# Patient Record
Sex: FEMALE | Race: BLACK OR AFRICAN AMERICAN | NOT HISPANIC OR LATINO | Employment: OTHER | URBAN - METROPOLITAN AREA
[De-identification: names, ages, dates, MRNs, and addresses within clinical notes are randomized per-mention and may not be internally consistent; named-entity substitution may affect disease eponyms.]

---

## 2017-01-09 ENCOUNTER — OFFICE VISIT (OUTPATIENT)
Dept: INTERNAL MEDICINE | Facility: CLINIC | Age: 82
End: 2017-01-09
Payer: MEDICARE

## 2017-01-09 ENCOUNTER — LAB VISIT (OUTPATIENT)
Dept: LAB | Facility: HOSPITAL | Age: 82
End: 2017-01-09
Attending: INTERNAL MEDICINE
Payer: MEDICARE

## 2017-01-09 VITALS
SYSTOLIC BLOOD PRESSURE: 136 MMHG | DIASTOLIC BLOOD PRESSURE: 80 MMHG | BODY MASS INDEX: 26.76 KG/M2 | HEIGHT: 68 IN | HEART RATE: 66 BPM | WEIGHT: 176.56 LBS

## 2017-01-09 DIAGNOSIS — R22.0 FACIAL SWELLING: ICD-10-CM

## 2017-01-09 DIAGNOSIS — R22.0 FACIAL SWELLING: Primary | ICD-10-CM

## 2017-01-09 DIAGNOSIS — I10 ESSENTIAL HYPERTENSION: ICD-10-CM

## 2017-01-09 LAB
ANION GAP SERPL CALC-SCNC: 10 MMOL/L
BUN SERPL-MCNC: 5 MG/DL
CALCIUM SERPL-MCNC: 9.1 MG/DL
CHLORIDE SERPL-SCNC: 110 MMOL/L
CO2 SERPL-SCNC: 23 MMOL/L
CREAT SERPL-MCNC: 0.8 MG/DL
EST. GFR  (AFRICAN AMERICAN): >60 ML/MIN/1.73 M^2
EST. GFR  (NON AFRICAN AMERICAN): >60 ML/MIN/1.73 M^2
GLUCOSE SERPL-MCNC: 99 MG/DL
POTASSIUM SERPL-SCNC: 4.3 MMOL/L
SODIUM SERPL-SCNC: 143 MMOL/L

## 2017-01-09 PROCEDURE — 1160F RVW MEDS BY RX/DR IN RCRD: CPT | Mod: S$GLB,,, | Performed by: INTERNAL MEDICINE

## 2017-01-09 PROCEDURE — 1159F MED LIST DOCD IN RCRD: CPT | Mod: S$GLB,,, | Performed by: INTERNAL MEDICINE

## 2017-01-09 PROCEDURE — 80048 BASIC METABOLIC PNL TOTAL CA: CPT

## 2017-01-09 PROCEDURE — 1126F AMNT PAIN NOTED NONE PRSNT: CPT | Mod: S$GLB,,, | Performed by: INTERNAL MEDICINE

## 2017-01-09 PROCEDURE — 99214 OFFICE O/P EST MOD 30 MIN: CPT | Mod: S$GLB,,, | Performed by: INTERNAL MEDICINE

## 2017-01-09 PROCEDURE — 99499 UNLISTED E&M SERVICE: CPT | Mod: S$GLB,,, | Performed by: INTERNAL MEDICINE

## 2017-01-09 PROCEDURE — 36415 COLL VENOUS BLD VENIPUNCTURE: CPT

## 2017-01-09 PROCEDURE — 99999 PR PBB SHADOW E&M-EST. PATIENT-LVL III: CPT | Mod: PBBFAC,,, | Performed by: INTERNAL MEDICINE

## 2017-01-09 PROCEDURE — 1157F ADVNC CARE PLAN IN RCRD: CPT | Mod: S$GLB,,, | Performed by: INTERNAL MEDICINE

## 2017-01-09 RX ORDER — AMLODIPINE BESYLATE 5 MG/1
5 TABLET ORAL DAILY
Qty: 30 TABLET | Refills: 3 | Status: SHIPPED | OUTPATIENT
Start: 2017-01-09 | End: 2017-01-20 | Stop reason: SINTOL

## 2017-01-09 NOTE — MR AVS SNAPSHOT
[facility] - Internal Medicine  [address]  [facility] [address]  Phone: [phone]  Fax: [phone]                  Dalila Garcia   2017 9:30 AM   Office Visit    Description:  Female : 1928   Provider:  Vazquez Marsh MD   Department:  [facility] - Internal Medicine           Reason for Visit     Facial Swelling           Diagnoses this Visit        Comments    Facial swelling    -  Primary     Essential hypertension                To Do List           Future Appointments        Provider Department Dept Phone    2017 10:30 AM LAB, APPOINTMENT [facility] [phone]    2017 12:00 PM Vazquez Marsh MD [facility] Internal Medicine [phone]      Goals (5 Years of Data)     None      Follow-Up and Disposition     Return in about 2 weeks (around 2017).       These Medications        Disp Refills Start End    amlodipine (NORVASC) 5 MG tablet 30 tablet 3 2017     Take 1 tablet (5 mg total) by mouth once daily. - Oral    Pharmacy: [facility] [id number] [place] [address] [address] [facility] AT [facility] Ph #: [phone]         [facility]     [facility] Nurse Care [facility] -  Assistance  Registered nurses in the [facility] Center provide clinical advisement, health education, appointment booking, and other advisory services.  Call for this free service at [phone].             Medications           Message regarding Medications     Verify the changes and/or additions to your medication regime listed below are the same as discussed with your clinician today.  If any of these changes or additions are incorrect, please notify your healthcare provider.        CHANGE how you are taking these medications     Start Taking Instead of    amlodipine (NORVASC) 5 MG tablet amlodipine (NORVASC) 2.5 MG tablet    Dosage:  Take 1 tablet (5 mg total) by mouth once daily. Dosage:  Take 1 tablet  "(2.5 mg total) by mouth once daily.    Reason for Change:  Reorder       STOP taking these medications     permethrin (ELIMITE) 5 % cream Apply neck down at night x 1 application. Wash off in a.m. Repeat in 1 week    ketoconazole (NIZORAL) 2 % cream AAA bid to nose and cheeks prn flare red scaly rash    hydrOXYzine HCl (ATARAX) 10 MG Tab Take 1 tablet (10 mg total) by mouth every evening. Sig 1-2 pills po qhs. Avoid driving on this medication    ibuprofen (ADVIL,MOTRIN) 800 MG tablet     benazepril (LOTENSIN) 20 MG tablet TAKE 1 TABLET BY MOUTH TWICE DAILY           Verify that the below list of medications is an accurate representation of the medications you are currently taking.  If none reported, the list may be blank. If incorrect, please contact your healthcare provider. Carry this list with you in case of emergency.           Current Medications     amlodipine (NORVASC) 5 MG tablet Take 1 tablet (5 mg total) by mouth once daily.    cetirizine (ZYRTEC) 10 MG tablet TK 1 T PO  QD    meclizine (ANTIVERT) 12.5 mg tablet Take 1 tablet (12.5 mg total) by mouth 3 (three) times daily as needed for Dizziness.    walker Misc Rollator walker for ambulation for both household and community distances.    augmented betamethasone dipropionate (DIPROLENE-AF) 0.05 % cream Apply topically 2 (two) times daily.           Clinical Reference Information           Vital Signs - Last Recorded  Most recent update: 1/9/2017 10:03 AM by Danita Worthington MA    BP Pulse Ht Wt BMI    136/80 (BP Location: Right arm, Patient Position: Sitting, BP Method: Manual) 66 5' 8" (1.727 m) 80.1 kg (176 lb 9.4 oz) 26.85 kg/m2      Blood Pressure          Most Recent Value    BP  136/80      Allergies as of 1/9/2017     Lotensin [Benazepril]    Celexa [Citalopram]      Immunizations Administered on Date of Encounter - 1/9/2017     None      Orders Placed During Today's Visit     Future Labs/Procedures Expected by Expires    Basic metabolic panel  " 1/9/2017 1/9/2018      [facility] Sign-Up     Activating your [facility] account is as easy as 1-2-3!     1) Visit [email or web address], select Sign Up Now, enter this activation code and your date of birth, then select Next.  [email or web address]  Expires: 2/23/2017 10:29 AM      2) Create a username and password to use when you visit [facility] in the future and select a security question in case you lose your password and select Next.    3) Enter your e-mail address and click Sign Up!    Additional Information  If you have questions, please e-mail [email or web address] or call [phone] to talk to our [facility] staff. Remember, [facility] is NOT to be used for urgent needs. For medical emergencies, dial 911.

## 2017-01-09 NOTE — PROGRESS NOTES
CHIEF COMPLAINT:  Facial edema.    HISTORY OF PRESENT ILLNESS:  The patient is an 88-year-old female with a history   of CHF, hypertension who comes in today with complaints of facial edema, which   started over a month ago.  She was seen by her dermatologist who recommended she   get seen by Internal Medicine.  The patient has been on benazepril for years   and never had any problems.    REVIEW OF SYSTEMS:  The patient reports no trouble swallowing, no trouble   breathing, no chest pain, no shortness of breath.    PHYSICAL EXAMINATION:  GENERAL APPEARANCE:  No acute distress.  HEENT:  Conjunctivae clear.  Pupils are equal.  She does have bilateral lens   replacements.  TMs were clear.  Nasal septum was midline without discharge.    Oropharynx is without erythema.  The patient does have an upper denture plate.    Trachea was midline.  PULMONARY:  Good inspiratory, expiratory breath sounds are heard.  Lungs are   clear to auscultation.  CARDIOVASCULAR:  S1, S2.  EXTREMITIES:  Without edema.  The patient's face was evaluated.  The patient does have some redness involving   her face, maybe a little bit of swelling right now compared to her picture.    ASSESSMENT:  1.  Facial edema, possibly ACE related.  2.  History of CHF.  3.  Hypertension.    PLAN:  We will go ahead and discontinue Lotensin twice a day.  We are going to   start the patient on amlodipine 5 mg.  She is currently on 2.5 mg once a day.    She is to follow up in two weeks for reevaluation of blood pressure.  We will   also check a BMP today.      NELSON/PENNY  dd: 01/09/2017 11:13:48 (CST)  td: 01/09/2017 23:55:36 (CST)  Doc ID   #[id number]  Job ID #[id number]    CC:

## 2017-01-16 ENCOUNTER — NURSE TRIAGE (OUTPATIENT)
Dept: ADMINISTRATIVE | Facility: CLINIC | Age: 82
End: 2017-01-16

## 2017-01-16 NOTE — TELEPHONE ENCOUNTER
Reason for Disposition   Mild facial swelling (puffiness) and persists > 3 days    Protocols used: ST FACE SWELLING-A-[place]

## 2017-01-20 ENCOUNTER — OFFICE VISIT (OUTPATIENT)
Dept: INTERNAL MEDICINE | Facility: CLINIC | Age: 82
End: 2017-01-20
Payer: MEDICARE

## 2017-01-20 VITALS
DIASTOLIC BLOOD PRESSURE: 80 MMHG | SYSTOLIC BLOOD PRESSURE: 150 MMHG | HEART RATE: 72 BPM | BODY MASS INDEX: 26.86 KG/M2 | WEIGHT: 177.25 LBS | HEIGHT: 68 IN

## 2017-01-20 DIAGNOSIS — L50.9 URTICARIA: ICD-10-CM

## 2017-01-20 DIAGNOSIS — R22.0 FACIAL SWELLING: ICD-10-CM

## 2017-01-20 DIAGNOSIS — R82.90 URINE MALODOR: Primary | ICD-10-CM

## 2017-01-20 LAB
BILIRUB UR QL STRIP: NEGATIVE
CLARITY UR REFRACT.AUTO: CLEAR
COLOR UR AUTO: NORMAL
GLUCOSE UR QL STRIP: NEGATIVE
HGB UR QL STRIP: NEGATIVE
KETONES UR QL STRIP: NEGATIVE
LEUKOCYTE ESTERASE UR QL STRIP: NEGATIVE
NITRITE UR QL STRIP: NEGATIVE
PH UR STRIP: 7 [PH] (ref 5–8)
PROT UR QL STRIP: NEGATIVE
SP GR UR STRIP: 1 (ref 1–1.03)
URN SPEC COLLECT METH UR: NORMAL
UROBILINOGEN UR STRIP-ACNC: NEGATIVE EU/DL

## 2017-01-20 PROCEDURE — 99213 OFFICE O/P EST LOW 20 MIN: CPT | Mod: S$GLB,,, | Performed by: INTERNAL MEDICINE

## 2017-01-20 PROCEDURE — 99999 PR PBB SHADOW E&M-EST. PATIENT-LVL III: CPT | Mod: PBBFAC,,, | Performed by: INTERNAL MEDICINE

## 2017-01-20 PROCEDURE — 81003 URINALYSIS AUTO W/O SCOPE: CPT

## 2017-01-20 PROCEDURE — 1159F MED LIST DOCD IN RCRD: CPT | Mod: S$GLB,,, | Performed by: INTERNAL MEDICINE

## 2017-01-20 PROCEDURE — 87086 URINE CULTURE/COLONY COUNT: CPT

## 2017-01-20 PROCEDURE — 1126F AMNT PAIN NOTED NONE PRSNT: CPT | Mod: S$GLB,,, | Performed by: INTERNAL MEDICINE

## 2017-01-20 PROCEDURE — 1157F ADVNC CARE PLAN IN RCRD: CPT | Mod: S$GLB,,, | Performed by: INTERNAL MEDICINE

## 2017-01-20 PROCEDURE — 1160F RVW MEDS BY RX/DR IN RCRD: CPT | Mod: S$GLB,,, | Performed by: INTERNAL MEDICINE

## 2017-01-20 RX ORDER — HYDROXYZINE HYDROCHLORIDE 10 MG/1
10 TABLET, FILM COATED ORAL NIGHTLY PRN
COMMUNITY
End: 2017-01-24 | Stop reason: SDUPTHER

## 2017-01-20 RX ORDER — CARVEDILOL 3.12 MG/1
3.12 TABLET ORAL 2 TIMES DAILY WITH MEALS
Qty: 60 TABLET | Refills: 11 | Status: SHIPPED | OUTPATIENT
Start: 2017-01-20 | End: 2017-01-26 | Stop reason: ALTCHOICE

## 2017-01-20 RX ORDER — HYDROXYZINE HYDROCHLORIDE 10 MG/1
25 TABLET, FILM COATED ORAL NIGHTLY PRN
COMMUNITY
End: 2017-01-20 | Stop reason: CLARIF

## 2017-01-20 NOTE — MR AVS SNAPSHOT
[facility] - Internal Medicine  [address]  [facility] [address]  Phone: [phone]  Fax: [phone]                  Dalila Garcia   2017 1:00 PM   Office Visit    Description:  Female : 1928   Provider:  Vazquez Marsh MD   Department:  [facility] - Internal Medicine           Reason for Visit     Facial Swelling           Diagnoses this Visit        Comments    Facial swelling         Urticaria                To Do List           Future Appointments        Provider Department Dept Phone    2017 12:00 PM Vazquez Marsh MD [facility] Internal Medicine [phone]      Goals (5 Years of Data)     None       These Medications        Disp Refills Start End    carvedilol (COREG) 3.125 MG tablet 60 tablet 11 2017    Take 1 tablet (3.125 mg total) by mouth 2 (two) times daily with meals. - Oral    Pharmacy: [facility] [address] [place] [address] AT [facility] Ph #: [phone]         [facility] On Call     [facility] On Call Nurse Care Line -  Assistance  Registered nurses in the [facility] On Call Center provide clinical advisement, health education, appointment booking, and other advisory services.  Call for this free service at [phone].             Medications           Message regarding Medications     Verify the changes and/or additions to your medication regime listed below are the same as discussed with your clinician today.  If any of these changes or additions are incorrect, please notify your healthcare provider.        START taking these NEW medications        Refills    carvedilol (COREG) 3.125 MG tablet 11    Sig: Take 1 tablet (3.125 mg total) by mouth 2 (two) times daily with meals.    Class: Normal    Route: Oral      STOP taking these medications     amlodipine (NORVASC) 5 MG tablet Take 1 tablet (5 mg total) by mouth once daily.    meclizine (ANTIVERT) 12.5 mg tablet Take 1 tablet (12.5 mg  "total) by mouth 3 (three) times daily as needed for Dizziness.    cetirizine (ZYRTEC) 10 MG tablet TK 1 T PO  QD    augmented betamethasone dipropionate (DIPROLENE-AF) 0.05 % cream Apply topically 2 (two) times daily.    walker Misc Rollator walker for ambulation for both household and community distances.           Verify that the below list of medications is an accurate representation of the medications you are currently taking.  If none reported, the list may be blank. If incorrect, please contact your healthcare provider. Carry this list with you in case of emergency.           Current Medications     hydrOXYzine HCl (ATARAX) 10 MG Tab Take 10 mg by mouth nightly as needed.    carvedilol (COREG) 3.125 MG tablet Take 1 tablet (3.125 mg total) by mouth 2 (two) times daily with meals.           Clinical Reference Information           Vital Signs - Last Recorded  Most recent update: 1/20/2017  1:51 PM by Danita Worthington MA    BP Pulse Ht Wt BMI    (!) 150/80 (BP Location: Right arm, Patient Position: Sitting, BP Method: Manual) 72 5' 8" (1.727 m) 80.4 kg (177 lb 4 oz) 26.95 kg/m2      Blood Pressure          Most Recent Value    BP  (!)  150/80      Allergies as of 1/20/2017     Lotensin [Benazepril]    Celexa [Citalopram]      Immunizations Administered on Date of Encounter - 1/20/2017     None      [facility] Sign-Up     Activating your [facility] account is as easy as 1-2-3!     1) Visit [email or web address], select Sign Up Now, enter this activation code and your date of birth, then select Next.  [email or web address]  Expires: 2/23/2017 10:29 AM      2) Create a username and password to use when you visit [facility] in the future and select a security question in case you lose your password and select Next.    3) Enter your e-mail address and click Sign Up!    Additional Information  If you have questions, please e-mail [email or web address] or call [phone] to talk to our [facility] staff. Remember, [facility] is NOT " to be used for urgent needs. For medical emergencies, dial 911.

## 2017-01-20 NOTE — PROGRESS NOTES
CHIEF COMPLAINT:  Facial swelling.    HPI:  The patient is an 88-year-old female with a history of hypertension,   anxiety, and dementia who comes in today for followup of facial swelling.  The   patient was seen for this on 01/09/2017.  She had been seen prior to this by her   dermatologist who recommended that she see us.  The reason for this; the   patient was on benazepril; it was felt that this might be causing her to have   problems.  She reports no trouble swallowing or trouble breathing.  Her   benazepril was discontinued.  The patient's amlodipine was increased from 2.5 to   5 mg.  The patient reports that she had an episode of swelling involving her   cheeks and tongue, but not as bad as before.  The patient did not take her   amlodipine today out of concern that this might be causing the swelling.  She   states that this was listed as one of the possible side effects.    REVIEW OF SYSTEMS:  The patient reports no trouble breathing.  No trouble   swallowing.  She has been feeling off balance this week.    PHYSICAL EXAMINATION:  GENERAL APPEARANCE:  No acute distress.  HEENT:  Conjunctivae clear.  She does have bilateral lens replacements.  TMs are   clear.  Nasal septum is midline without discharge.  Oropharynx is without   erythema.  The patient's face looked about the same to me as when I had last   seen her.  PULMONARY:  Good inspiratory and expiratory breath sounds are heard.  Lungs are   clear to auscultation.  CARDIOVASCULAR:  S1, S2.  EXTREMITIES:  Without edema.  ABDOMEN:  Nontender, nondistended, without hepatosplenomegaly.    COMMENTS:  Approximately 15 minutes were spent in discussion with the patient   about her current symptoms.  We discussed her medications.  At this time, I want   to stop her blood pressure medication since her pressure appears to be stable   off of the amlodipine.  I want to see her back early next week to see how her   pressure is doing and to see if the swelling in her  face improves.  The patient   is also to take her hydroxyzine daily at night for the next five days to see if   that helps.  She is to call for any elevation of her blood pressure.      NELSON/PENNY  dd: 01/20/2017 17:59:25 (CST)  td: 01/21/2017 04:57:44 (CST)  Doc ID   #[id number]  Job ID #[id number]    CC:

## 2017-01-22 LAB — BACTERIA UR CULT: NORMAL

## 2017-01-23 ENCOUNTER — TELEPHONE (OUTPATIENT)
Dept: INTERNAL MEDICINE | Facility: CLINIC | Age: 82
End: 2017-01-23

## 2017-01-23 NOTE — TELEPHONE ENCOUNTER
----- Message from Katie Ayon sent at 1/20/2017  4:12 PM CST -----  Contact: Self/ [phone]   Pt want to let the doctor know the name of the medication she need to take for hives. The name of Hydroxyzine-HCL 10 mg. Please call and advise     Thank you

## 2017-01-24 RX ORDER — HYDROXYZINE HYDROCHLORIDE 10 MG/1
10 TABLET, FILM COATED ORAL NIGHTLY PRN
Qty: 30 TABLET | Refills: 1 | Status: SHIPPED | OUTPATIENT
Start: 2017-01-24 | End: 2017-01-26

## 2017-01-26 ENCOUNTER — OFFICE VISIT (OUTPATIENT)
Dept: INTERNAL MEDICINE | Facility: CLINIC | Age: 82
End: 2017-01-26
Payer: MEDICARE

## 2017-01-26 VITALS
WEIGHT: 174.81 LBS | HEART RATE: 64 BPM | BODY MASS INDEX: 26.49 KG/M2 | SYSTOLIC BLOOD PRESSURE: 134 MMHG | HEIGHT: 68 IN | DIASTOLIC BLOOD PRESSURE: 60 MMHG

## 2017-01-26 DIAGNOSIS — I10 ESSENTIAL HYPERTENSION: Primary | ICD-10-CM

## 2017-01-26 PROCEDURE — 99999 PR PBB SHADOW E&M-EST. PATIENT-LVL III: CPT | Mod: PBBFAC,,, | Performed by: INTERNAL MEDICINE

## 2017-01-26 PROCEDURE — 1159F MED LIST DOCD IN RCRD: CPT | Mod: S$GLB,,, | Performed by: INTERNAL MEDICINE

## 2017-01-26 PROCEDURE — 1126F AMNT PAIN NOTED NONE PRSNT: CPT | Mod: S$GLB,,, | Performed by: INTERNAL MEDICINE

## 2017-01-26 PROCEDURE — 1157F ADVNC CARE PLAN IN RCRD: CPT | Mod: S$GLB,,, | Performed by: INTERNAL MEDICINE

## 2017-01-26 PROCEDURE — 99213 OFFICE O/P EST LOW 20 MIN: CPT | Mod: S$GLB,,, | Performed by: INTERNAL MEDICINE

## 2017-01-26 PROCEDURE — 99499 UNLISTED E&M SERVICE: CPT | Mod: S$GLB,,, | Performed by: INTERNAL MEDICINE

## 2017-01-26 PROCEDURE — 1160F RVW MEDS BY RX/DR IN RCRD: CPT | Mod: S$GLB,,, | Performed by: INTERNAL MEDICINE

## 2017-01-26 RX ORDER — METOPROLOL TARTRATE 25 MG/1
12.5 TABLET, FILM COATED ORAL 2 TIMES DAILY
Qty: 30 TABLET | Refills: 11 | Status: SHIPPED | OUTPATIENT
Start: 2017-01-26 | End: 2017-04-04 | Stop reason: SDUPTHER

## 2017-01-26 RX ORDER — AMLODIPINE BESYLATE 2.5 MG/1
5 TABLET ORAL DAILY
COMMUNITY
End: 2017-01-26 | Stop reason: ALTCHOICE

## 2017-01-26 NOTE — PROGRESS NOTES
CHIEF COMPLAINT:  Followup of blood pressure plus facial edema.    HPI:  The patient is an 88-year-old female who comes in today for followup of   facial edema.  The patient was on amlodipine 2.5 mg once a day, Coreg 3.125 mg   b.i.d.  The patient stopped taking both medications because of facial and tongue   edema.  She had stopped both and tried one at a time.  Each one caused facial   swelling and tongue swelling, so she stopped both as of two days ago.  The   swelling has gone down and she is inquiring whether she can take metoprolol, a   neighbor of hers is on metoprolol and she has never had any problems with it;   meaning her neighbor.    REVIEW OF SYSTEMS:  The patient reports no chest pain or shortness of breath.    She does state her blood pressure does fluctuate at home.    PHYSICAL EXAMINATION:  GENERAL APPEARANCE:  In no acute distress.  HEENT:  The patient's face and tongue actually do look better and less prominent   as opposed to last visit.  PULMONARY:  Good inspiratory and expiratory breath sounds are heard.  Lungs are   clear to auscultation.  CARDIOVASCULAR:  S1, S2.  EXTREMITIES:  Without edema.  ABDOMEN:  Nontender, nondistended, without hepatosplenomegaly.    ASSESSMENT:  1.  Hypertension.  2.  Facial edema.    PLAN:  We will put the patient on metoprolol 25 mg one-half tablet b.i.d.  She   is to follow up in two weeks for reevaluation.  The patient does have a   prescription for hydroxyzine at her pharmacy.  She is instructed to take that   for any problems with swelling.      NELSON/PENNY  dd: 01/26/2017 10:03:10 (CST)  td: 01/27/2017 04:24:58 (CST)  Doc ID   #[id number]  Job ID #[id number]    CC:

## 2017-01-26 NOTE — MR AVS SNAPSHOT
[facility]  [address] [address]  Phone: [phone]  Fax: [phone]                  Dalila Garcia   2017 9:30 AM   Office Visit    Description:  Female : 1928   Provider:  Vazquez Marsh MD   Department:  [facility] - Internal Medicine           Reason for Visit     Follow-up           Diagnoses this Visit        Comments    Essential hypertension    -  Primary            To Do List           Future Appointments        Provider Department Dept Phone    2/10/2017 9:30 AM Vazquez Marsh MD [facility] Internal Medicine [phone]      Goals (5 Years of Data)     None      Follow-Up and Disposition     Return in about 2 weeks (around 2017).       These Medications        Disp Refills Start End    metoprolol tartrate (LOPRESSOR) 25 MG tablet 30 tablet 11 2017    Take 0.5 tablets (12.5 mg total) by mouth 2 (two) times daily. - Oral    [facility] [address] [place] [address] AT [facility] Ph #: [phone]         [facility] On Call     [facility] Nurse Care Line -  Assistance  Registered nurses in the [facility] provide clinical advisement, health education, appointment booking, and other advisory services.  Call for this free service at [phone].             Medications           Message regarding Medications     Verify the changes and/or additions to your medication regime listed below are the same as discussed with your clinician today.  If any of these changes or additions are incorrect, please notify your healthcare provider.        START taking these NEW medications        Refills    metoprolol tartrate (LOPRESSOR) 25 MG tablet 11    Sig: Take 0.5 tablets (12.5 mg total) by mouth 2 (two) times daily.    Class: Normal    Route: Oral      STOP taking these medications     hydrOXYzine HCl (ATARAX) 10 MG Tab Take 1 tablet (10 mg total) by mouth  "nightly as needed.    carvedilol (COREG) 3.125 MG tablet Take 1 tablet (3.125 mg total) by mouth 2 (two) times daily with meals.    amlodipine (NORVASC) 2.5 MG tablet Take 5 mg by mouth once daily.           Verify that the below list of medications is an accurate representation of the medications you are currently taking.  If none reported, the list may be blank. If incorrect, please contact your healthcare provider. Carry this list with you in case of emergency.           Current Medications     metoprolol tartrate (LOPRESSOR) 25 MG tablet Take 0.5 tablets (12.5 mg total) by mouth 2 (two) times daily.           Clinical Reference Information           Vital Signs - Last Recorded  Most recent update: 1/26/2017  9:47 AM by Vazquez Marsh MD    BP Pulse Ht Wt BMI    134/60 (BP Location: Right arm, Patient Position: Sitting, BP Method: Manual) 64 5' 8" (1.727 m) 79.3 kg (174 lb 13.2 oz) 26.58 kg/m2      Blood Pressure          Most Recent Value    BP  134/60      Allergies as of 1/26/2017     Lotensin [Benazepril]    Celexa [Citalopram]      Immunizations Administered on Date of Encounter - 1/26/2017     None      [facility] Sign-Up     Activating your [facility] account is as easy as 1-2-3!     1) Visit [email or web address], select Sign Up Now, enter this activation code and your date of birth, then select Next.  [email or web address]  Expires: 2/23/2017 10:29 AM      2) Create a username and password to use when you visit [facility] in the future and select a security question in case you lose your password and select Next.    3) Enter your e-mail address and click Sign Up!    Additional Information  If you have questions, please e-mail [email or web address] or call [phone] to talk to our [facility] staff. Remember, [facility] is NOT to be used for urgent needs. For medical emergencies, dial 911.         "

## 2017-02-10 ENCOUNTER — OFFICE VISIT (OUTPATIENT)
Dept: INTERNAL MEDICINE | Facility: CLINIC | Age: 82
End: 2017-02-10
Payer: MEDICARE

## 2017-02-10 VITALS
HEART RATE: 57 BPM | BODY MASS INDEX: 26.93 KG/M2 | DIASTOLIC BLOOD PRESSURE: 70 MMHG | WEIGHT: 177.69 LBS | SYSTOLIC BLOOD PRESSURE: 122 MMHG | HEIGHT: 68 IN

## 2017-02-10 DIAGNOSIS — R22.0 FACIAL SWELLING: Primary | ICD-10-CM

## 2017-02-10 DIAGNOSIS — I10 ESSENTIAL HYPERTENSION: ICD-10-CM

## 2017-02-10 PROCEDURE — 99499 UNLISTED E&M SERVICE: CPT | Mod: S$GLB,,, | Performed by: INTERNAL MEDICINE

## 2017-02-10 PROCEDURE — 99213 OFFICE O/P EST LOW 20 MIN: CPT | Mod: S$GLB,,, | Performed by: INTERNAL MEDICINE

## 2017-02-10 PROCEDURE — 1157F ADVNC CARE PLAN IN RCRD: CPT | Mod: S$GLB,,, | Performed by: INTERNAL MEDICINE

## 2017-02-10 PROCEDURE — 1159F MED LIST DOCD IN RCRD: CPT | Mod: S$GLB,,, | Performed by: INTERNAL MEDICINE

## 2017-02-10 PROCEDURE — 99999 PR PBB SHADOW E&M-EST. PATIENT-LVL III: CPT | Mod: PBBFAC,,, | Performed by: INTERNAL MEDICINE

## 2017-02-10 PROCEDURE — 1126F AMNT PAIN NOTED NONE PRSNT: CPT | Mod: S$GLB,,, | Performed by: INTERNAL MEDICINE

## 2017-02-10 PROCEDURE — 1160F RVW MEDS BY RX/DR IN RCRD: CPT | Mod: S$GLB,,, | Performed by: INTERNAL MEDICINE

## 2017-02-10 NOTE — MR AVS SNAPSHOT
"    [facility] Internal Medicine  [address]  [facility] [address]  Phone: [phone]  Fax: [phone]                  Dalila Garcia   2/10/2017 9:30 AM   Office Visit    Description:  Female : 1928   Provider:  Vazquez Marsh MD   Department:  [facility] - Internal Medicine           Reason for Visit     Follow-up           Diagnoses this Visit        Comments    Facial swelling    -  Primary     Essential hypertension                To Do List           Future Appointments        Provider Department Dept Phone    5/10/2017 10:30 AM Vazquez Marsh MD [facility] Internal Medicine [phone]      Goals (5 Years of Data)     None      Follow-Up and Disposition     Return in about 3 months (around 5/10/2017).    Follow-up and Disposition History      [facility]     [facility] Nurse Care Line -  Assistance  Registered nurses in the [facility] Center provide clinical advisement, health education, appointment booking, and other advisory services.  Call for this free service at [phone].             Medications           Message regarding Medications     Verify the changes and/or additions to your medication regime listed below are the same as discussed with your clinician today.  If any of these changes or additions are incorrect, please notify your healthcare provider.             Verify that the below list of medications is an accurate representation of the medications you are currently taking.  If none reported, the list may be blank. If incorrect, please contact your healthcare provider. Carry this list with you in case of emergency.           Current Medications     metoprolol tartrate (LOPRESSOR) 25 MG tablet Take 0.5 tablets (12.5 mg total) by mouth 2 (two) times daily.           Clinical Reference Information           Your Vitals Were     BP Pulse Height Weight BMI    122/70 (BP Location: Right arm, Patient Position: Sitting, BP Method: Manual) 57 5' 8" " (1.727 m) 80.6 kg (177 lb 11.1 oz) 27.02 kg/m2      Blood Pressure          Most Recent Value    BP  122/70      Allergies as of 2/10/2017     Lotensin [Benazepril]    Celexa [Citalopram]      Immunizations Administered on Date of Encounter - 2/10/2017     None      [facility] Sign-Up     Activating your [facility] account is as easy as 1-2-3!     1) Visit [email or web address], select Sign Up Now, enter this activation code and your date of birth, then select Next.  [email or web address]  Expires: 2/23/2017 10:29 AM      2) Create a username and password to use when you visit [facility] in the future and select a security question in case you lose your password and select Next.    3) Enter your e-mail address and click Sign Up!    Additional Information  If you have questions, please e-mail [email or web address] or call [phone] to talk to our [facility] staff. Remember, [facility] is NOT to be used for urgent needs. For medical emergencies, dial 911.         Language Assistance Services     ATTENTION: Language assistance services are available, free of charge. Please call [phone].      ATENCIÓN: Si habla español, tiene a serrano disposición servicios gratuitos de asistencia lingüística. Llame al [phone].     MANE Ý: N?u b?n nói Ti?ng Vi?t, có các d?ch v? h? tr? ngôn ng? mi?n phí dành cho b?n. G?i s? [phone].         [facility] complies with applicable Federal civil rights laws and does not discriminate on the basis of race, color, national origin, age, disability, or sex.

## 2017-03-05 RX ORDER — BENAZEPRIL HYDROCHLORIDE 20 MG/1
TABLET ORAL
Qty: 180 TABLET | Refills: 0 | OUTPATIENT
Start: 2017-03-05

## 2017-04-03 RX ORDER — AMLODIPINE BESYLATE 2.5 MG/1
TABLET ORAL
Qty: 90 TABLET | Refills: 0 | Status: SHIPPED | OUTPATIENT
Start: 2017-04-03 | End: 2017-04-04 | Stop reason: CLARIF

## 2017-04-04 ENCOUNTER — TELEPHONE (OUTPATIENT)
Dept: INTERNAL MEDICINE | Facility: CLINIC | Age: 82
End: 2017-04-04

## 2017-04-04 RX ORDER — METOPROLOL TARTRATE 25 MG/1
12.5 TABLET, FILM COATED ORAL 2 TIMES DAILY
Qty: 30 TABLET | Refills: 11 | Status: SHIPPED | OUTPATIENT
Start: 2017-04-04 | End: 2017-04-17 | Stop reason: ALTCHOICE

## 2017-04-04 NOTE — TELEPHONE ENCOUNTER
----- Message from Kay Vazquez sent at 4/4/2017  9:43 AM CDT -----  Contact: Patient  The patient says she is currently taking metoprolol but amlodipine was sent to the pharmacy yesterday, and the patient would like to know why the amlodipine was called in.  Please call her at [phone].    Thanks!

## 2017-04-06 RX ORDER — AMLODIPINE BESYLATE 5 MG/1
TABLET ORAL
Qty: 90 TABLET | Refills: 3 | OUTPATIENT
Start: 2017-04-06

## 2017-04-10 ENCOUNTER — HOSPITAL ENCOUNTER (EMERGENCY)
Facility: OTHER | Age: 82
Discharge: HOME | End: 2017-04-10
Attending: EMERGENCY MEDICINE
Payer: MEDICARE

## 2017-04-10 VITALS
RESPIRATION RATE: 18 BRPM | DIASTOLIC BLOOD PRESSURE: 92 MMHG | HEIGHT: 68 IN | WEIGHT: 175 LBS | HEART RATE: 63 BPM | OXYGEN SATURATION: 95 % | BODY MASS INDEX: 26.52 KG/M2 | SYSTOLIC BLOOD PRESSURE: 207 MMHG | TEMPERATURE: 98 F

## 2017-04-10 DIAGNOSIS — R42 DIZZINESS: ICD-10-CM

## 2017-04-10 DIAGNOSIS — I10 ESSENTIAL HYPERTENSION: Primary | ICD-10-CM

## 2017-04-10 LAB
ANION GAP SERPL CALC-SCNC: 9 MMOL/L
BASOPHILS # BLD AUTO: 0.05 K/UL
BASOPHILS NFR BLD: 0.8 %
BUN SERPL-MCNC: 6 MG/DL
CALCIUM SERPL-MCNC: 8.9 MG/DL
CHLORIDE SERPL-SCNC: 110 MMOL/L
CO2 SERPL-SCNC: 24 MMOL/L
CREAT SERPL-MCNC: 0.8 MG/DL
DIFFERENTIAL METHOD: NORMAL
EOSINOPHIL # BLD AUTO: 0.1 K/UL
EOSINOPHIL NFR BLD: 2.1 %
ERYTHROCYTE [DISTWIDTH] IN BLOOD BY AUTOMATED COUNT: 14.2 %
EST. GFR  (AFRICAN AMERICAN): >60 ML/MIN/1.73 M^2
EST. GFR  (NON AFRICAN AMERICAN): >60 ML/MIN/1.73 M^2
GLUCOSE SERPL-MCNC: 96 MG/DL
HCT VFR BLD AUTO: 39.6 %
HGB BLD-MCNC: 13 G/DL
LYMPHOCYTES # BLD AUTO: 2 K/UL
LYMPHOCYTES NFR BLD: 29.9 %
MCH RBC QN AUTO: 29.8 PG
MCHC RBC AUTO-ENTMCNC: 32.8 %
MCV RBC AUTO: 91 FL
MONOCYTES # BLD AUTO: 0.7 K/UL
MONOCYTES NFR BLD: 10.8 %
NEUTROPHILS # BLD AUTO: 3.7 K/UL
NEUTROPHILS NFR BLD: 56.1 %
PLATELET # BLD AUTO: 276 K/UL
PMV BLD AUTO: 10.5 FL
POTASSIUM SERPL-SCNC: 4.2 MMOL/L
RBC # BLD AUTO: 4.36 M/UL
SODIUM SERPL-SCNC: 143 MMOL/L
WBC # BLD AUTO: 6.56 K/UL

## 2017-04-10 PROCEDURE — 80048 BASIC METABOLIC PNL TOTAL CA: CPT

## 2017-04-10 PROCEDURE — 99283 EMERGENCY DEPT VISIT LOW MDM: CPT | Mod: 25

## 2017-04-10 PROCEDURE — 85025 COMPLETE CBC W/AUTO DIFF WBC: CPT

## 2017-04-10 PROCEDURE — 93005 ELECTROCARDIOGRAM TRACING: CPT

## 2017-04-10 PROCEDURE — 93010 ELECTROCARDIOGRAM REPORT: CPT | Mod: ,,, | Performed by: INTERNAL MEDICINE

## 2017-04-10 RX ORDER — HYDROXYZINE HYDROCHLORIDE 10 MG/1
25 TABLET, FILM COATED ORAL 3 TIMES DAILY PRN
COMMUNITY
End: 2017-04-17

## 2017-04-10 NOTE — ED TRIAGE NOTES
Patient c/o her BP being 200's/90s for the past 2 days with some intermittent dizziness and fatigue.  Patient stated she has been taking her BP medications as prescribed.

## 2017-04-10 NOTE — ED PROVIDER NOTES
Encounter Date: 4/10/2017    SCRIBE #1 NOTE: I, Princess German, am scribing for, and in the presence of, Dr. Coker.       History     Chief Complaint   Patient presents with    Dizziness     pt reports dizziness/lightheadedness for the past 3 days; denies any SOB; denies any chest pain;      Review of patient's allergies indicates:   Allergen Reactions    Lotensin [benazepril] Edema    Celexa [citalopram] Other (See Comments)     Hot flashes      HPI Comments:   Time seen by provider: 6:19 PM    The patient is a 89 y.o. female with HTN, CHF, dementia, and anxiety who presents to the ED with an onset of elevated BP for the last 3 days. She reports that her BP was recorded at 201/91 and was concerned for a stroke. The patient's antihypertensive medication has been changed multiple times due to symptoms of facial swelling per Dr. Marsh. Currently, she is on Metoprolol 12.5 mg. The patient denies worsening chronic light-headedness, chronic dizziness, or chronic unsteady gait, or symptoms or any other symptoms at this time. No pertinent SHx noted.    The history is provided by the patient.     Past Medical History:   Diagnosis Date    Anxiety     Arthritis     Cataract     Congestive heart failure     Dementia     Dry eye syndrome     Dry senile macular degeneration     Hypertension     Joint pain     Posterior capsular opacification, both eyes     Psychiatric problem     PVD (peripheral vascular disease)      Past Surgical History:   Procedure Laterality Date    CATARACT EXTRACTION      both eyes    HYSTERECTOMY      TOTAL KNEE ARTHROPLASTY      right     Family History   Problem Relation Age of Onset    Glaucoma Mother     Cancer Sister     Cancer Brother     Cancer Sister     Cancer Sister     Diabetes Son     Cataracts Son     Melanoma Neg Hx     Alcohol abuse Neg Hx     Anxiety disorder Neg Hx     Dementia Neg Hx     Depression Neg Hx     Schizophrenia Neg Hx     Suicide Neg Hx      Drug abuse Neg Hx      Social History   Substance Use Topics    Smoking status: Former Smoker     Types: Cigarettes     Quit date: 4/29/2005    Smokeless tobacco: Never Used    Alcohol use No     Review of Systems   Constitutional: Negative for chills and fever.   HENT: Negative for nosebleeds.    Eyes: Negative for visual disturbance.   Respiratory: Negative for cough and shortness of breath.    Cardiovascular: Negative for chest pain and palpitations.   Gastrointestinal: Negative for abdominal pain, diarrhea, nausea and vomiting.   Genitourinary: Negative for dysuria and hematuria.   Musculoskeletal: Negative for gait problem.   Skin: Negative for rash.   Neurological: Negative for dizziness and light-headedness.     Physical Exam   Initial Vitals   BP Pulse Resp Temp SpO2   04/10/17 1725 04/10/17 1725 04/10/17 1725 04/10/17 1725 04/10/17 1725   178/77 62 18 97.8 °F (36.6 °C) 96 %     Physical Exam    Nursing note and vitals reviewed.  Constitutional: She appears well-developed and well-nourished. She is not diaphoretic. No distress.   HENT:   Head: Normocephalic and atraumatic.   Mouth/Throat: Oropharynx is clear and moist.   Eyes: Conjunctivae and EOM are normal. Pupils are equal, round, and reactive to light.   Neck: Normal range of motion. Neck supple. No JVD present.   Cardiovascular: Normal rate, regular rhythm and normal heart sounds.   No murmur heard.  Pulmonary/Chest: Breath sounds normal. No respiratory distress. She has no wheezes. She has no rhonchi. She has no rales.   Abdominal: Soft. Bowel sounds are normal. She exhibits no distension and no mass. There is no tenderness. There is no rebound and no guarding.   Musculoskeletal: Normal range of motion. She exhibits no edema or tenderness.   Neurological: She is alert and oriented to person, place, and time. She has normal strength. No cranial nerve deficit or sensory deficit. Gait abnormal.   Normal strength and sensation. Unsteady gait.   Skin:  Skin is warm and dry. No rash noted.   Psychiatric: She has a normal mood and affect.       ED Course   Procedures  Labs Reviewed   BASIC METABOLIC PANEL - Abnormal; Notable for the following:        Result Value    BUN, Bld 6 (*)     All other components within normal limits   CBC W/ AUTO DIFFERENTIAL           Medical Decision Making:   History:   Old Medical Records: I decided to obtain old medical records.  Initial Assessment:   Patient with elevated BP. No neurological symptoms or findings. Her unsteady gait is chronic.   Clinical Tests:   Lab Tests: Ordered and Reviewed  Medical Tests: Reviewed and Ordered  ED Management:  Will check basic labs and EKG. If no acute emergency, will discharge home. Will send a message to Dr. Marsh to change her BP medication; since she has a complicated history, I do not want to increased her betablocker with a HR of 60 bpm.            Scribe Attestation:   Scribe #1: I performed the above scribed service and the documentation accurately describes the services I performed. I attest to the accuracy of the note.    Attending Attestation:           Physician Attestation for Scribe:  Physician Attestation Statement for Scribe #1: I, Dr. Coker, reviewed documentation, as scribed by Princess German in my presence, and it is both accurate and complete.                 ED Course     Clinical Impression:     1. Essential hypertension    2. Dizziness          Disposition:   Disposition: Discharged  Condition: Stable       Daniel Coker MD  04/12/17 1945

## 2017-04-10 NOTE — ED AVS SNAPSHOT
[facility]  [address]  [facility] [address]               Dalila Garcia   4/10/2017  6:06 PM   ED    Description:  Female : 1928   Department:  [facility]           Your Care was Coordinated By:     Provider Role From To    Daniel Coker MD Attending Provider 04/10/17 6989 --      Reason for Visit     Dizziness           Diagnoses this Visit        Comments    Essential hypertension    -  Primary     Dizziness           ED Disposition     None           To Do List           Follow-up Information     Follow up with Vazquez Marsh MD. Call in 1 day.    Specialty:  Internal Medicine    Contact information:    [address]  [facility] [address]  [phone]          Follow up with [facility].    Specialty:  Emergency Medicine    Why:  If symptoms worsen    Contact information:    [address]  [facility] [id number] [phone]      [facility]     [facility] Nurse Care Line -  Assistance  Unless otherwise directed by your provider, please contact [facility], our nurse care line that is available for  assistance.     Registered nurses in the [facility] provide: appointment scheduling, clinical advisement, health education, and other advisory services.  Call: [phone] (toll free)               Medications           Message regarding Medications     Verify the changes and/or additions to your medication regime listed below are the same as discussed with your clinician today.  If any of these changes or additions are incorrect, please notify your healthcare provider.             Verify that the below list of medications is an accurate representation of the medications you are currently taking.  If none reported, the list may be blank. If incorrect, please contact your healthcare provider. Carry this list with you in case of emergency.           Current Medications   "   hydrOXYzine HCl (ATARAX) 10 MG Tab Take 25 mg by mouth 3 (three) times daily as needed.    metoprolol tartrate (LOPRESSOR) 25 MG tablet Take 0.5 tablets (12.5 mg total) by mouth 2 (two) times daily.           Clinical Reference Information           Your Vitals Were     BP Pulse Temp Resp Height Weight    178/77 (BP Location: Left arm, Patient Position: Sitting) 62 97.8 °F (36.6 °C) (Oral) 18 5' 8" (1.727 m) 79.4 kg (175 lb)    SpO2 BMI             96% 26.61 kg/m2         Allergies as of 4/10/2017        Reactions    Lotensin [Benazepril] Edema    Celexa [Citalopram] Other (See Comments)    Hot flashes       Immunizations Administered on Date of Encounter - 4/10/2017     None      ED Micro, Lab, POCT     Start Ordered       Status Ordering Provider    04/10/17 1828 04/10/17 1827  CBC auto differential  STAT      Final result     04/10/17 1828 04/10/17 1827  Basic metabolic panel  STAT      Final result       ED Imaging Orders     None        Discharge Instructions         Discharge Instructions for High Blood Pressure (Hypertension)  You have been diagnosed with high blood pressure (also called hypertension). This means the force of blood against your artery walls is too strong. It also means your heart is working hard to move blood. High blood pressure usually has no symptoms, but over time, it can damage your heart, blood vessels, eyes, kidneys, and other organs. With help from your doctor, you can manage your blood pressure and protect your health.  Taking medicine  · Learn to take your own blood pressure. Keep a record of your results. Ask your doctor which readings mean that you need medical attention.  · Take your blood pressure medicine exactly as directed. Dont skip doses. Missing doses can cause your blood pressure to get out of control.  · If you do miss a dose (or doses) check with your healthcare provider about what to do.  · Avoid medicine that contain heart stimulants, including over-the-counter " "drugs. Check for warnings about high blood pressure on the label. Ask the pharmacist before purchasing something you haven't used before  · Check with your doctor or pharmacist before taking a decongestant. Some decongestants can worsen high blood pressure.  Lifestyle changes  · Maintain a healthy weight. Get help to lose any extra pounds.  · Cut back on salt.  ¨ Limit canned, dried, packaged, and fast foods.  ¨ Dont add salt to your food at the table.  ¨ Season foods with herbs instead of salt when you cook.  ¨ Request no added salt when you go to a restaurant.  ¨ The American Heart Associations (AHA) "ideal" sodium intake recommendation is 1,500 milligrams per day.  However, since American's eat so much salt, the AHA says a positive change can occur by cutting back to even 2,400 milligrams of sodium a day.   · Follow the DASH (Dietary Approaches to Stop Hypertension) eating plan. This plan recommends vegetables, fruits, whole gains, and other heart healthy foods.  · Begin an exercise program. Ask your doctor how to get started. The American Heart Association recommends aerobic exercise 3 to 4 times a week for an average of 40 minutes at a time, with your doctor's approval. Simple activities like walking or gardening can help.  · Break the smoking habit. Enroll in a stop-smoking program to improve your chances of success. Ask your healthcare provider about programs and medicines to help you stop smoking.  · Limit drinks that contain caffeine (coffee, black or green tea, cola) to 2 per day.  · Never take stimulants such as amphetamines or cocaine; these drugs can be deadly for someone with high blood pressure.  · Control your stress. Learn stress-management techniques.  · Limit alcohol to no more than 1 drink a day for women and 2 drinks a day for men.  Follow-up care  Make a follow-up appointment as directed by our staff.     When to seek medical care  Call your doctor immediately or seek emergency care if you " have any of the following:  · Chest pain or shortness of breath (call 911)  · Moderate to severe headache  · Weakness in the muscles of your face, arms, or legs  · Trouble speaking  · Extreme drowsiness  · Confusion  · Fainting or dizziness  · Pulsating or rushing sound in your ears  · Unexplained nosebleed  · Weakness, tingling, or numbness of your face, arms, or legs  · Change in vision  · Blood pressure measured at home that is greater than 180/110   Date Last Reviewed: 4/27/2016  © 6477-5940 [employer]. [address] [place] [address]. All rights reserved. This information is not intended as a substitute for professional medical care. Always follow your healthcare professional's instructions.          Your Scheduled Appointments     May 10, 2017  9:00 AM CDT   Health Assessment with HRA, NOM [address]   [address] - Internal Medicine ([facility])    [address] [place] [id number]   [phone]            May 10, 2017 10:30 AM CDT   Established Patient Visit with MD [facility] - Internal Medicine ([facility])    [address] [place] [id number]   [phone]              [facility] Sign-Up     Activating your [facility] account is as easy as 1-2-3!     1) Visit [email or web address], select Sign Up Now, enter this activation code and your date of birth, then select Next.  -[email or web address]  Expires: 5/25/2017  7:40 PM      2) Create a username and password to use when you visit [facility] in the future and select a security question in case you lose your password and select Next.    3) Enter your e-mail address and click Sign Up!    Additional Information  If you have questions, please e-mail [email or web address] or call [phone] to talk to our [facility] staff. Remember, [facility] is NOT to be used for urgent needs. For medical emergencies, dial 911.         Smoking Cessation     If you  would like to quit smoking:   You may be eligible for free services if you are a [place] resident and started smoking cigarettes before September 1, 1988.  Call the Smoking Cessation Trust (SCT) toll free at (095) 972-3688 or (965) 251-5496.   Call 1-800-QUIT-NOW if you do not meet the above criteria.   Contact us via email: [email or web address]   View our website for more information: [email or web address]         [facility] complies with applicable Federal civil rights laws and does not discriminate on the basis of race, color, national origin, age, disability, or sex.        Language Assistance Services     ATTENTION: Language assistance services are available, free of charge. Please call [phone].      ATENCIÓN: Si habla español, tiene a serrano disposición servicios gratuitos de asistencia lingüística. Llame al [phone].     CHÚ Ý: N?u b?n nói Ti?ng Vi?t, có các d?ch v? h? tr? ngôn ng? mi?n phí dành cho b?n. G?i s? [phone].

## 2017-04-11 ENCOUNTER — TELEPHONE (OUTPATIENT)
Dept: INTERNAL MEDICINE | Facility: CLINIC | Age: 82
End: 2017-04-11

## 2017-04-11 NOTE — TELEPHONE ENCOUNTER
----- Message from Vazquez Marsh MD sent at 4/10/2017  8:02 PM CDT -----  Please contact and schedule a follow up for blood pressure.

## 2017-04-11 NOTE — DISCHARGE INSTRUCTIONS
"  Discharge Instructions for High Blood Pressure (Hypertension)  You have been diagnosed with high blood pressure (also called hypertension). This means the force of blood against your artery walls is too strong. It also means your heart is working hard to move blood. High blood pressure usually has no symptoms, but over time, it can damage your heart, blood vessels, eyes, kidneys, and other organs. With help from your doctor, you can manage your blood pressure and protect your health.  Taking medicine  · Learn to take your own blood pressure. Keep a record of your results. Ask your doctor which readings mean that you need medical attention.  · Take your blood pressure medicine exactly as directed. Dont skip doses. Missing doses can cause your blood pressure to get out of control.  · If you do miss a dose (or doses) check with your healthcare provider about what to do.  · Avoid medicine that contain heart stimulants, including over-the-counter drugs. Check for warnings about high blood pressure on the label. Ask the pharmacist before purchasing something you haven't used before  · Check with your doctor or pharmacist before taking a decongestant. Some decongestants can worsen high blood pressure.  Lifestyle changes  · Maintain a healthy weight. Get help to lose any extra pounds.  · Cut back on salt.  ¨ Limit canned, dried, packaged, and fast foods.  ¨ Dont add salt to your food at the table.  ¨ Season foods with herbs instead of salt when you cook.  ¨ Request no added salt when you go to a restaurant.  ¨ The American Heart Associations (AHA) "ideal" sodium intake recommendation is 1,500 milligrams per day.  However, since American's eat so much salt, the AHA says a positive change can occur by cutting back to even 2,400 milligrams of sodium a day.   · Follow the DASH (Dietary Approaches to Stop Hypertension) eating plan. This plan recommends vegetables, fruits, whole gains, and other heart healthy foods.  · Begin " an exercise program. Ask your doctor how to get started. The American Heart Association recommends aerobic exercise 3 to 4 times a week for an average of 40 minutes at a time, with your doctor's approval. Simple activities like walking or gardening can help.  · Break the smoking habit. Enroll in a stop-smoking program to improve your chances of success. Ask your healthcare provider about programs and medicines to help you stop smoking.  · Limit drinks that contain caffeine (coffee, black or green tea, cola) to 2 per day.  · Never take stimulants such as amphetamines or cocaine; these drugs can be deadly for someone with high blood pressure.  · Control your stress. Learn stress-management techniques.  · Limit alcohol to no more than 1 drink a day for women and 2 drinks a day for men.  Follow-up care  Make a follow-up appointment as directed by our staff.     When to seek medical care  Call your doctor immediately or seek emergency care if you have any of the following:  · Chest pain or shortness of breath (call 911)  · Moderate to severe headache  · Weakness in the muscles of your face, arms, or legs  · Trouble speaking  · Extreme drowsiness  · Confusion  · Fainting or dizziness  · Pulsating or rushing sound in your ears  · Unexplained nosebleed  · Weakness, tingling, or numbness of your face, arms, or legs  · Change in vision  · Blood pressure measured at home that is greater than 180/110   Date Last Reviewed: 4/27/2016  © 0896-5134 [employer]. [address] [place] [address], [place] [address]. All rights reserved. This information is not intended as a substitute for professional medical care. Always follow your healthcare professional's instructions.

## 2017-04-11 NOTE — TELEPHONE ENCOUNTER
Pt states she have to talk to her transportation before scheduling an appt . Pt states she will call me back

## 2017-04-12 ENCOUNTER — NURSE TRIAGE (OUTPATIENT)
Dept: ADMINISTRATIVE | Facility: CLINIC | Age: 82
End: 2017-04-12

## 2017-04-12 NOTE — TELEPHONE ENCOUNTER
Reason for Disposition   Taking BP medications and feels is having side effects (e.g., impotence, cough, dizziness)    Protocols used: ST HIGH BLOOD PRESSURE-A-OH  Patient states her blood pressure is 177/89. She states she was seen in the ED for HTN on Monday. Today patient is requesting to restart amlodipine or have an adjustment to metoprolol. She states she is dizzy, but has been for a few days.

## 2017-04-12 NOTE — TELEPHONE ENCOUNTER
Pt states her BP today was 177/89 and pt want to know can you start back taking amlodipine with the metoprolol ?

## 2017-04-12 NOTE — TELEPHONE ENCOUNTER
----- Message from Lin Pham sent at 4/12/2017 12:15 PM CDT -----  Contact: self  Pt is calling to inform Dr Marsh that she was seen in the ED on Monday.  She would like to speak with the nurse regarding her current condition.  Pt is experiencing high blood pressure needs to be advised.      Pt blood pressure is currently 177/98.  Yesterday it was 200/101    Pt can be reached at [phone]    I will also get the on call nurse on the line to speak with her just incase she should be doing something about it while waiting for a call from your office.

## 2017-04-17 ENCOUNTER — OFFICE VISIT (OUTPATIENT)
Dept: INTERNAL MEDICINE | Facility: CLINIC | Age: 82
End: 2017-04-17
Payer: MEDICARE

## 2017-04-17 VITALS
DIASTOLIC BLOOD PRESSURE: 84 MMHG | SYSTOLIC BLOOD PRESSURE: 172 MMHG | HEIGHT: 68 IN | WEIGHT: 177.69 LBS | HEART RATE: 69 BPM | BODY MASS INDEX: 26.93 KG/M2

## 2017-04-17 DIAGNOSIS — I10 ESSENTIAL HYPERTENSION: Primary | ICD-10-CM

## 2017-04-17 PROCEDURE — 99213 OFFICE O/P EST LOW 20 MIN: CPT | Mod: S$GLB,,, | Performed by: INTERNAL MEDICINE

## 2017-04-17 PROCEDURE — 1160F RVW MEDS BY RX/DR IN RCRD: CPT | Mod: S$GLB,,, | Performed by: INTERNAL MEDICINE

## 2017-04-17 PROCEDURE — 1126F AMNT PAIN NOTED NONE PRSNT: CPT | Mod: S$GLB,,, | Performed by: INTERNAL MEDICINE

## 2017-04-17 PROCEDURE — 1159F MED LIST DOCD IN RCRD: CPT | Mod: S$GLB,,, | Performed by: INTERNAL MEDICINE

## 2017-04-17 PROCEDURE — 99499 UNLISTED E&M SERVICE: CPT | Mod: S$GLB,,, | Performed by: INTERNAL MEDICINE

## 2017-04-17 PROCEDURE — 99999 PR PBB SHADOW E&M-EST. PATIENT-LVL III: CPT | Mod: PBBFAC,,, | Performed by: INTERNAL MEDICINE

## 2017-04-17 RX ORDER — AMLODIPINE BESYLATE 5 MG/1
5 TABLET ORAL 2 TIMES DAILY
COMMUNITY
End: 2017-05-02 | Stop reason: SDUPTHER

## 2017-04-17 NOTE — PROGRESS NOTES
CHIEF COMPLAINT:  Followup of hypertension.    HISTORY OF PRESENT ILLNESS:  The patient is an 89-year-old female who we see for   hypertension who comes in as an ER followup for elevated blood pressure.  The   patient was seen in the Emergency Department because her pressure had been   running high.  She had contacted our office last week.  We had advised her to go   back on to amlodipine and to take both her metoprolol 25 mg 1/2 tablet twice a   day along with amlodipine.  The patient did not do so.  Instead, the patient was   seen in the Emergency Department.  Since her discharge from there, the patient   is taking amlodipine 5 mg once a day, but has only taken it for the past 4 days.    After about a day or so of taking this, she had stopped taking her metoprolol.    She states she became a little bit anxious.  She thought it was due to one of   the medications, so she stopped her metoprolol completely.  She is just on   amlodipine by itself for the past 2 days.    REVIEW OF SYSTEMS:  The patient reports no chest pain, no shortness of breath   and no lower extremity edema.  The patient did bring in her blood pressure   machine.  Her machine read 172/84.  My nurse got 140/70.  Repeat blood pressure   taken by myself in the right arm was 134/82 and left arm was 144/80.  Her   machine read 159/75.    PHYSICAL EXAMINATION:  GENERAL APPEARANCE:  No acute distress.  HEENT:  Conjunctivae are clear.  She has bilateral lens replacements.  TMs look   clear.  Nasal septum is midline without discharge.  Oropharynx is without   erythema.  The patient has upper denture in place.  She has no lower teeth.  NECK:  Trachea is midline without JVD.  PULMONARY:  Good inspiratory and expiratory breath sounds are heard.  Lungs are   clear to auscultation.  CARDIOVASCULAR:  S1 and S2.  Rhythm appeared to be normal.  EXTREMITIES:  Without edema.  ABDOMEN:  Nontender, nondistended and without hepatosplenomegaly.    ASSESSMENT:  1.   Hypertension, currently on amlodipine 5 mg once a day by itself.  2.  History of facial edema.  The patient has had no since occurrence since   restarting the amlodipine.    PLAN:  The patient is to continue with amlodipine 5 mg once a day.  She is to   stay off of the Lopressor for now.  She is to keep the medication on hand in   case we need it.  She is to follow up in 2 weeks for reevaluation of blood   pressure.      NELSON/PENNY  dd: 04/17/2017 13:53:34 (CDT)  td: 04/18/2017 08:19:36 (CDT)  Doc ID   #[id number]  Job ID #[id number]    CC:

## 2017-04-17 NOTE — MR AVS SNAPSHOT
[facility]  [address]  [facility] [address]  Phone: [phone]  Fax: [phone]                  Dalila Garcia   2017 10:30 AM   Office Visit    Description:  Female : 1928   Provider:  Vazquez Marsh MD   Department:  [facility]           Reason for Visit     Hypertension     Fatigue           Diagnoses this Visit        Comments    Essential hypertension    -  Primary            To Do List           Future Appointments        Provider Department Dept Phone    2017 10:00 AM Vazquez Marsh MD [facility] [phone]    5/10/2017 9:00 AM HRA, NOM 3 [facility] [phone]    5/10/2017 10:30 AM Vazquez Marsh MD [facility] [phone]      Goals (5 Years of Data)     None      Follow-Up and Disposition     Return in about 2 weeks (around 2017).      [facility]     [facility] Nurse Care Line -  Assistance  Unless otherwise directed by your provider, please contact [facility], our nurse care line that is available for  assistance.     Registered nurses in the [facility] provide: appointment scheduling, clinical advisement, health education, and other advisory services.  Call: [phone] (toll free)               Medications           Message regarding Medications     Verify the changes and/or additions to your medication regime listed below are the same as discussed with your clinician today.  If any of these changes or additions are incorrect, please notify your healthcare provider.        STOP taking these medications     hydrOXYzine HCl (ATARAX) 10 MG Tab Take 25 mg by mouth 3 (three) times daily as needed.    metoprolol tartrate (LOPRESSOR) 25 MG tablet Take 0.5 tablets (12.5 mg total) by mouth 2 (two) times daily.           Verify that the below list of medications is an accurate representation of the medications you are currently taking.   "If none reported, the list may be blank. If incorrect, please contact your healthcare provider. Carry this list with you in case of emergency.           Current Medications     amlodipine (NORVASC) 5 MG tablet Take 5 mg by mouth once daily.           Clinical Reference Information           Your Vitals Were     BP Pulse Height Weight BMI    172/84 (BP Location: Left arm, Patient Position: Sitting, BP Method: Automatic) 69 5' 8" (1.727 m) 177.9 kg (392 lb 3.2 oz) 59.63 kg/m2      Blood Pressure          Most Recent Value    BP  (!)  172/84      Allergies as of 4/17/2017     Lotensin [Benazepril]    Celexa [Citalopram]      Immunizations Administered on Date of Encounter - 4/17/2017     None      [facility] Sign-Up     Activating your [facility] account is as easy as 1-2-3!     1) Visit [email or web address], select Sign Up Now, enter this activation code and your date of birth, then select Next.  [email or web address]  Expires: 5/25/2017  7:40 PM      2) Create a username and password to use when you visit [facility] in the future and select a security question in case you lose your password and select Next.    3) Enter your e-mail address and click Sign Up!    Additional Information  If you have questions, please e-mail [email or web address] or call [phone] to talk to our [facility] staff. Remember, [facility] is NOT to be used for urgent needs. For medical emergencies, dial 911.         Language Assistance Services     ATTENTION: Language assistance services are available, free of charge. Please call [phone].      ATENCIÓN: Si habla español, tiene a serrano disposición servicios gratuitos de asistencia lingüística. Llame al [phone].     MANE Ý: N?u b?n nói Ti?ng Vi?t, có các d?ch v? h? tr? ngôn ng? mi?n phí dành cho b?n. G?i s? [phone].         [facility] complies with applicable Federal civil rights laws and does not discriminate on the basis of race, color, national origin, age, disability, " or sex.

## 2017-05-02 ENCOUNTER — OFFICE VISIT (OUTPATIENT)
Dept: INTERNAL MEDICINE | Facility: CLINIC | Age: 82
End: 2017-05-02
Payer: MEDICARE

## 2017-05-02 VITALS
DIASTOLIC BLOOD PRESSURE: 60 MMHG | HEIGHT: 68 IN | WEIGHT: 177.25 LBS | BODY MASS INDEX: 26.86 KG/M2 | SYSTOLIC BLOOD PRESSURE: 124 MMHG | HEART RATE: 75 BPM

## 2017-05-02 DIAGNOSIS — I10 ESSENTIAL HYPERTENSION: Primary | ICD-10-CM

## 2017-05-02 PROCEDURE — 99213 OFFICE O/P EST LOW 20 MIN: CPT | Mod: S$GLB,,, | Performed by: INTERNAL MEDICINE

## 2017-05-02 PROCEDURE — 1160F RVW MEDS BY RX/DR IN RCRD: CPT | Mod: S$GLB,,, | Performed by: INTERNAL MEDICINE

## 2017-05-02 PROCEDURE — 1126F AMNT PAIN NOTED NONE PRSNT: CPT | Mod: S$GLB,,, | Performed by: INTERNAL MEDICINE

## 2017-05-02 PROCEDURE — 1159F MED LIST DOCD IN RCRD: CPT | Mod: S$GLB,,, | Performed by: INTERNAL MEDICINE

## 2017-05-02 PROCEDURE — 99999 PR PBB SHADOW E&M-EST. PATIENT-LVL III: CPT | Mod: PBBFAC,,, | Performed by: INTERNAL MEDICINE

## 2017-05-02 PROCEDURE — 99499 UNLISTED E&M SERVICE: CPT | Mod: S$GLB,,, | Performed by: INTERNAL MEDICINE

## 2017-05-02 RX ORDER — AMLODIPINE BESYLATE 5 MG/1
5 TABLET ORAL 2 TIMES DAILY
Qty: 180 TABLET | Refills: 3 | Status: SHIPPED | OUTPATIENT
Start: 2017-05-02 | End: 2018-05-14 | Stop reason: SDUPTHER

## 2017-05-02 NOTE — MR AVS SNAPSHOT
[facility]  [address]  [facility] [address]  Phone: [phone]  Fax: [phone]                  Dalila Garcia   2017 10:00 AM   Office Visit    Description:  Female : 1928   Provider:  Vazquez Marsh MD   Department:  [facility] Internal Medicine           Reason for Visit     Follow-up           Diagnoses this Visit        Comments    Essential hypertension    -  Primary            To Do List           Future Appointments        Provider Department Dept Phone    5/10/2017 9:00 AM HRA, NOM 3 [facility] [phone]    5/10/2017 10:30 AM Vazquez Marsh MD [facility] [phone]      Goals (5 Years of Data)     None      Follow-Up and Disposition     Return in about 3 months (around 2017).       These Medications        Disp Refills Start End    amlodipine (NORVASC) 5 MG tablet 180 tablet 3 2017     Take 1 tablet (5 mg total) by mouth 2 (two) times daily. - Oral    Pharmacy: [facility] [id number] [place] [address] AT [facility] Ph #: [phone]         [facility]     [facility] Care [facility] -  Assistance  Unless otherwise directed by your provider, please contact [facility], our nurse care line that is available for  assistance.     Registered nurses in the [facility] provide: appointment scheduling, clinical advisement, health education, and other advisory services.  Call: [phone] (toll free)               Medications           Message regarding Medications     Verify the changes and/or additions to your medication regime listed below are the same as discussed with your clinician today.  If any of these changes or additions are incorrect, please notify your healthcare provider.        CHANGE how you are taking these medications     Start Taking Instead of    amlodipine (NORVASC) 5 MG tablet amlodipine (NORVASC) 5 MG  "tablet    Dosage:  Take 1 tablet (5 mg total) by mouth 2 (two) times daily. Dosage:  Take 5 mg by mouth 2 (two) times daily.    Reason for Change:  Reorder            Verify that the below list of medications is an accurate representation of the medications you are currently taking.  If none reported, the list may be blank. If incorrect, please contact your healthcare provider. Carry this list with you in case of emergency.           Current Medications     amlodipine (NORVASC) 5 MG tablet Take 1 tablet (5 mg total) by mouth 2 (two) times daily.           Clinical Reference Information           Your Vitals Were     BP Pulse Height Weight BMI    124/60 (BP Location: Right arm, Patient Position: Sitting, BP Method: Manual) 75 5' 8" (1.727 m) 80.4 kg (177 lb 4 oz) 26.95 kg/m2      Blood Pressure          Most Recent Value    BP  124/60      Allergies as of 5/2/2017     Lotensin [Benazepril]    Celexa [Citalopram]      Immunizations Administered on Date of Encounter - 5/2/2017     None      [facility] Sign-Up     Activating your [facility] account is as easy as 1-2-3!     1) Visit [email or web address], select Sign Up Now, enter this activation code and your date of birth, then select Next.  -[email or web address]  Expires: 5/25/2017  7:40 PM      2) Create a username and password to use when you visit [facility] in the future and select a security question in case you lose your password and select Next.    3) Enter your e-mail address and click Sign Up!    Additional Information  If you have questions, please e-mail [email or web address] or call [phone] to talk to our [facility] staff. Remember, [facility] is NOT to be used for urgent needs. For medical emergencies, dial 911.         Language Assistance Services     ATTENTION: Language assistance services are available, free of charge. Please call [phone].      ATENCIÓN: Si habla español, tiene a serrano disposición servicios gratuitos de asistencia lingüística. Llame al " [phone].     MANE Ý: N?u b?n nói Ti?ng Vi?t, có các d?ch v? h? tr? ngôn ng? mi?n phí dành cho b?n. G?i s? [phone].         [facility] complies with applicable Federal civil rights laws and does not discriminate on the basis of race, color, national origin, age, disability, or sex.

## 2017-05-02 NOTE — PROGRESS NOTES
CHIEF COMPLAINT:  Followup of hypertension.    HISTORY OF PRESENT ILLNESS:  The patient is an 89-year-old female with   hypertension who comes in today for followup of hypertension.  The patient had   been started on amlodipine 5 mg once a day.  The patient reports that her   pressure was still not coming down, so about 1-1/2 weeks ago, she increased her   amlodipine on her own to 5 mg b.i.d.  She got this idea from a family member who   was on amlodipine 5 mg b.i.d.  She reports no problems with the medication,   maybe a little bit of facial swelling.  She does have a family member who is   with her today who indicates they have not noticed any problems.  The patient   herself states that nobody in the family has seen any problems with facial   swelling.    REVIEW OF SYSTEMS:  No chest pain.  No shortness of breath.  No dizziness.    PHYSICAL EXAMINATION:  GENERAL APPEARANCE:  No acute distress.  PULMONARY:  Good inspiratory and expiratory breath sounds are heard.  Lungs are   clear to auscultation.  CARDIOVASCULAR:  S1 and S2.  EXTREMITIES:  Without edema.  ABDOMEN:  Nontender, nondistended and without hepatosplenomegaly.  HEENT:  The patient's face was evaluated.  I do not appreciate any swelling   involving her face at all.    ASSESSMENT:  Hypertension.    PLAN:  The patient is to continue with amlodipine at 5 mg b.i.d.  She is to   follow up in 3 months with us for reevaluation.  She is to call for any   problems.      ADONAY  dd: 05/02/2017 17:47:28 (CDT)  td: 05/03/2017 14:56:31 (CDT)  Doc ID   #[id number]  Job ID #[id number]    CC:

## 2017-06-29 ENCOUNTER — TELEPHONE (OUTPATIENT)
Dept: INTERNAL MEDICINE | Facility: CLINIC | Age: 82
End: 2017-06-29

## 2017-06-29 NOTE — TELEPHONE ENCOUNTER
----- Message from Maria Guadalupe Roper sent at 6/29/2017 11:50 AM CDT -----  Contact: Self/[phone]  Pt said that she is calling in regards to needing to speak with the nurse pt stated that her blood pressure is stating elevated after she takes her medication pt would like to know if she can take the medication 3 tines a day until her blood pressure returns to normal. Please call and advise            Thanks!!!

## 2017-06-29 NOTE — TELEPHONE ENCOUNTER
Pt states that her bp is running high although she's taking the amlodipine twice a day. Pt states that her systolic is running in the 180s and her diastolic is running in the 100s. Pt took her bp while on the phone, it read 155/99. Pt states that she isn't feeling bad or anything. She did ask if she can take a 3rd amlodipine, advised pt not to do that. Pt would like to know what should she do?  Please advise.

## 2017-06-30 NOTE — TELEPHONE ENCOUNTER
Spoke with patient, states that she and her daughter tried some natural things and it has gotten her BP down. States BP was 136/87 this morning. Patient did not want an UC appt at this time. Scheduled patient an appt 7/25.

## 2017-08-02 ENCOUNTER — LAB VISIT (OUTPATIENT)
Dept: LAB | Facility: HOSPITAL | Age: 82
End: 2017-08-02
Attending: INTERNAL MEDICINE
Payer: MEDICARE

## 2017-08-02 ENCOUNTER — OFFICE VISIT (OUTPATIENT)
Dept: INTERNAL MEDICINE | Facility: CLINIC | Age: 82
End: 2017-08-02
Payer: MEDICARE

## 2017-08-02 VITALS
DIASTOLIC BLOOD PRESSURE: 90 MMHG | HEIGHT: 68 IN | SYSTOLIC BLOOD PRESSURE: 130 MMHG | WEIGHT: 176.81 LBS | BODY MASS INDEX: 26.8 KG/M2 | HEART RATE: 75 BPM

## 2017-08-02 DIAGNOSIS — I10 ESSENTIAL HYPERTENSION: Primary | ICD-10-CM

## 2017-08-02 DIAGNOSIS — R60.0 LOWER EXTREMITY EDEMA: ICD-10-CM

## 2017-08-02 DIAGNOSIS — I10 ESSENTIAL HYPERTENSION: ICD-10-CM

## 2017-08-02 LAB
ANION GAP SERPL CALC-SCNC: 9 MMOL/L
BUN SERPL-MCNC: 9 MG/DL
CALCIUM SERPL-MCNC: 8.9 MG/DL
CHLORIDE SERPL-SCNC: 113 MMOL/L
CO2 SERPL-SCNC: 23 MMOL/L
CREAT SERPL-MCNC: 0.8 MG/DL
EST. GFR  (AFRICAN AMERICAN): >60 ML/MIN/1.73 M^2
EST. GFR  (NON AFRICAN AMERICAN): >60 ML/MIN/1.73 M^2
GLUCOSE SERPL-MCNC: 103 MG/DL
POTASSIUM SERPL-SCNC: 3.4 MMOL/L
SODIUM SERPL-SCNC: 145 MMOL/L

## 2017-08-02 PROCEDURE — 80048 BASIC METABOLIC PNL TOTAL CA: CPT

## 2017-08-02 PROCEDURE — 99213 OFFICE O/P EST LOW 20 MIN: CPT | Mod: S$GLB,,, | Performed by: INTERNAL MEDICINE

## 2017-08-02 PROCEDURE — 99499 UNLISTED E&M SERVICE: CPT | Mod: S$GLB,,, | Performed by: INTERNAL MEDICINE

## 2017-08-02 PROCEDURE — 99999 PR PBB SHADOW E&M-EST. PATIENT-LVL III: CPT | Mod: PBBFAC,,, | Performed by: INTERNAL MEDICINE

## 2017-08-02 PROCEDURE — 1159F MED LIST DOCD IN RCRD: CPT | Mod: S$GLB,,, | Performed by: INTERNAL MEDICINE

## 2017-08-02 PROCEDURE — 1126F AMNT PAIN NOTED NONE PRSNT: CPT | Mod: S$GLB,,, | Performed by: INTERNAL MEDICINE

## 2017-08-02 PROCEDURE — 36415 COLL VENOUS BLD VENIPUNCTURE: CPT

## 2017-08-02 PROCEDURE — 93005 ELECTROCARDIOGRAM TRACING: CPT | Mod: S$GLB,,, | Performed by: INTERNAL MEDICINE

## 2017-08-02 PROCEDURE — 93010 ELECTROCARDIOGRAM REPORT: CPT | Mod: S$GLB,,, | Performed by: INTERNAL MEDICINE

## 2017-08-02 RX ORDER — MECLIZINE HCL 12.5 MG 12.5 MG/1
12.5 TABLET ORAL 2 TIMES DAILY PRN
Qty: 60 TABLET | Refills: 3 | Status: SHIPPED | OUTPATIENT
Start: 2017-08-02 | End: 2019-02-04

## 2017-08-02 RX ORDER — IBUPROFEN 800 MG/1
TABLET ORAL
COMMUNITY
Start: 2017-06-28 | End: 2017-08-02 | Stop reason: ALTCHOICE

## 2017-08-02 RX ORDER — HYDROCHLOROTHIAZIDE 12.5 MG/1
12.5 CAPSULE ORAL
Qty: 30 CAPSULE | Refills: 11 | Status: SHIPPED | OUTPATIENT
Start: 2017-08-02 | End: 2017-09-22

## 2017-08-02 RX ORDER — HYDROXYZINE HYDROCHLORIDE 10 MG/1
TABLET, FILM COATED ORAL
COMMUNITY
Start: 2017-08-01 | End: 2017-08-02 | Stop reason: CLARIF

## 2017-08-02 NOTE — PROGRESS NOTES
CHIEF COMPLAINT:  Followup of hypertension plus lower extremity edema.    HISTORY OF PRESENT ILLNESS:  The patient is an 89-year-old female who we see for   hypertension who comes in today with complaints of lower extremity edema   involving her feet and ankles.  She states it has been going on, off and on   since we last saw her.  The patient does report compliance with her amlodipine.    She does check her blood pressure at home.  The highest it has been is around   140/80 and the lowest is 130/76.  She does report taking ibuprofen 800 mg maybe   once a week or once every 2 weeks.    REVIEW OF SYSTEMS:  The patient reports some difficulty with swallowing on   occasion.  When asked to clarify, she reports sometimes it is a little slow   going down.  No chest pain.  No shortness of breath.  She still has problems   with dizziness.  She will take meclizine on occasion, which does help.    PHYSICAL EXAMINATION:  GENERAL APPEARANCE:  She is in no acute distress.  HEENT:  Trachea is midline without JVD.  PULMONARY:  Good inspiratory and expiratory breath sounds are heard.  Lungs are   clear to auscultation.  CARDIOVASCULAR:  S1 and S2.  EXTREMITIES:  I did not really appreciate any ankle edema.  She did have some   puffiness about the feet, but appeared to be mild.    ASSESSMENT:  1.  Hypertension.  2.  Trace edema.    PLAN:  We will put the patient in for a BMP today.  We will also check an EKG.    We will start her on HCTZ 12.5 mg on Monday, Wednesday and Friday.  She is to   follow up in 1 month for reevaluation.  She was also advised to stop ibuprofen.    She is also to take or continue with her amlodipine.      NELSON/PENNY  dd: 08/02/2017 11:14:47 (CDT)  td: 08/02/2017 22:57:23 (CDT)  Doc ID   #[id number]  Job ID #[id number]    CC:

## 2017-08-03 DIAGNOSIS — I48.91 NEW ONSET A-FIB: Primary | ICD-10-CM

## 2017-08-07 ENCOUNTER — HOSPITAL ENCOUNTER (OUTPATIENT)
Dept: CARDIOLOGY | Facility: CLINIC | Age: 82
Discharge: HOME | End: 2017-08-07
Payer: MEDICARE

## 2017-08-07 DIAGNOSIS — I48.91 NEW ONSET A-FIB: ICD-10-CM

## 2017-08-07 LAB
DIASTOLIC DYSFUNCTION: NO
ESTIMATED PA SYSTOLIC PRESSURE: 30.67
MITRAL VALVE MOBILITY: NORMAL
MITRAL VALVE REGURGITATION: NORMAL
RETIRED EF AND QEF - SEE NOTES: 65 (ref 55–65)
TRICUSPID VALVE REGURGITATION: NORMAL

## 2017-08-07 PROCEDURE — 93306 TTE W/DOPPLER COMPLETE: CPT | Mod: S$GLB,,, | Performed by: INTERNAL MEDICINE

## 2017-08-08 ENCOUNTER — TELEPHONE (OUTPATIENT)
Dept: INTERNAL MEDICINE | Facility: CLINIC | Age: 82
End: 2017-08-08

## 2017-08-08 NOTE — TELEPHONE ENCOUNTER
----- Message from Maria Guadalupe Roper sent at 8/8/2017 10:12 AM CDT -----  Contact: Self/[phone]  Pt said that she is calling in regards to she had a rx for Hydrochlorothiazide sent to the pharmacy pt said that she does not know why the rx was sent to the pharmacy and what is the medication for. Please call and advise              Thanks!!!

## 2017-08-08 NOTE — TELEPHONE ENCOUNTER
----- Message from Shakira Ibarra sent at 8/3/2017 11:05 AM CDT -----  Regarding: EKG ORDER  Please put in EKG order, thanks. Shakira [phone]

## 2017-08-08 NOTE — TELEPHONE ENCOUNTER
Please advise pt states hctz was sent to pharmacy and she want to know why she need to take this ?

## 2017-08-10 ENCOUNTER — TELEPHONE (OUTPATIENT)
Dept: INTERNAL MEDICINE | Facility: CLINIC | Age: 82
End: 2017-08-10

## 2017-08-10 NOTE — TELEPHONE ENCOUNTER
----- Message from Katie Ayon sent at 8/10/2017  8:37 AM CDT -----  Contact: Self/ [phone]   Type: Test Results    What test was performed?  ECHO/STRESS LAB    Who ordered the test? Dr. Marsh     When and where were the test performed? 08/07/2017    Comments: pt want to go over the test results. Please call and advise     Thank you

## 2017-08-10 NOTE — TELEPHONE ENCOUNTER
----- Message from Katie Ayon sent at 8/9/2017  2:14 PM CDT -----  Contact: Self/ [phone]   Type: Test Results    What test was performed?  ECHO/STRESS     Who ordered the test? Dr. Marsh     When and where were the test performed? 08/07/2017     Comments: pt want to have the test results. Please call and advise     Thank you

## 2017-08-14 ENCOUNTER — TELEPHONE (OUTPATIENT)
Dept: INTERNAL MEDICINE | Facility: CLINIC | Age: 82
End: 2017-08-14

## 2017-08-15 DIAGNOSIS — I10 ESSENTIAL HYPERTENSION: Primary | ICD-10-CM

## 2017-08-16 ENCOUNTER — TELEPHONE (OUTPATIENT)
Dept: INTERNAL MEDICINE | Facility: CLINIC | Age: 82
End: 2017-08-16

## 2017-08-21 ENCOUNTER — OFFICE VISIT (OUTPATIENT)
Dept: CARDIOLOGY | Facility: CLINIC | Age: 82
End: 2017-08-21
Payer: MEDICARE

## 2017-08-21 ENCOUNTER — HOSPITAL ENCOUNTER (OUTPATIENT)
Dept: RADIOLOGY | Facility: HOSPITAL | Age: 82
Discharge: HOME | End: 2017-08-21
Payer: MEDICARE

## 2017-08-21 ENCOUNTER — TELEPHONE (OUTPATIENT)
Dept: CARDIOLOGY | Facility: CLINIC | Age: 82
End: 2017-08-21

## 2017-08-21 ENCOUNTER — CLINICAL SUPPORT (OUTPATIENT)
Dept: ELECTROPHYSIOLOGY | Facility: CLINIC | Age: 82
End: 2017-08-21
Payer: MEDICARE

## 2017-08-21 VITALS
WEIGHT: 175.06 LBS | BODY MASS INDEX: 26.53 KG/M2 | DIASTOLIC BLOOD PRESSURE: 64 MMHG | HEART RATE: 66 BPM | SYSTOLIC BLOOD PRESSURE: 130 MMHG | HEIGHT: 68 IN

## 2017-08-21 DIAGNOSIS — I10 ESSENTIAL HYPERTENSION: ICD-10-CM

## 2017-08-21 DIAGNOSIS — Z12.31 VISIT FOR SCREENING MAMMOGRAM: ICD-10-CM

## 2017-08-21 DIAGNOSIS — I48.0 PAROXYSMAL ATRIAL FIBRILLATION: Primary | ICD-10-CM

## 2017-08-21 DIAGNOSIS — R42 DIZZINESS: ICD-10-CM

## 2017-08-21 DIAGNOSIS — I48.91 ATRIAL FIBRILLATION, UNSPECIFIED TYPE: ICD-10-CM

## 2017-08-21 DIAGNOSIS — R60.0 PERIPHERAL EDEMA: ICD-10-CM

## 2017-08-21 DIAGNOSIS — I48.91 ATRIAL FIBRILLATION, UNSPECIFIED TYPE: Primary | ICD-10-CM

## 2017-08-21 PROCEDURE — 77067 SCR MAMMO BI INCL CAD: CPT | Mod: TC

## 2017-08-21 PROCEDURE — 93000 ELECTROCARDIOGRAM COMPLETE: CPT | Mod: S$GLB,,, | Performed by: INTERNAL MEDICINE

## 2017-08-21 PROCEDURE — 3008F BODY MASS INDEX DOCD: CPT | Mod: S$GLB,,, | Performed by: INTERNAL MEDICINE

## 2017-08-21 PROCEDURE — 1126F AMNT PAIN NOTED NONE PRSNT: CPT | Mod: S$GLB,,, | Performed by: INTERNAL MEDICINE

## 2017-08-21 PROCEDURE — 99499 UNLISTED E&M SERVICE: CPT | Mod: S$GLB,,, | Performed by: INTERNAL MEDICINE

## 2017-08-21 PROCEDURE — 77067 SCR MAMMO BI INCL CAD: CPT | Mod: 26,,, | Performed by: RADIOLOGY

## 2017-08-21 PROCEDURE — 99204 OFFICE O/P NEW MOD 45 MIN: CPT | Mod: S$GLB,,, | Performed by: INTERNAL MEDICINE

## 2017-08-21 PROCEDURE — 1159F MED LIST DOCD IN RCRD: CPT | Mod: S$GLB,,, | Performed by: INTERNAL MEDICINE

## 2017-08-21 PROCEDURE — 93268 ECG RECORD/REVIEW: CPT | Mod: S$GLB,,, | Performed by: INTERNAL MEDICINE

## 2017-08-21 PROCEDURE — 99999 PR PBB SHADOW E&M-EST. PATIENT-LVL III: CPT | Mod: PBBFAC,,, | Performed by: INTERNAL MEDICINE

## 2017-08-21 RX ORDER — ASPIRIN 81 MG/1
81 TABLET ORAL DAILY
Qty: 30 TABLET | Refills: 6 | Status: SHIPPED | OUTPATIENT
Start: 2017-08-21 | End: 2018-02-20 | Stop reason: SDUPTHER

## 2017-08-21 NOTE — Clinical Note
Thank you for referring Dalila Garcia for evaluation of paroxysmal atrial fibrillation. Please see my note for details of this encounter. If you have any questions, please contact me.  Thank you again for the referral.

## 2017-08-21 NOTE — PROGRESS NOTES
Chart has been dictated using voice recognition software.  It is not been reviewed carefully for any transcriptional errors due to this technology.   Subjective:   Patient ID:  Dalila Garcia is a 89 y.o. female who presents for evaluation of Joint Swelling and Atrial Fibrillation      HPI: Patient with h/o hypertension and peripheral edema presents for evaluation of new onset atrial fibrillation.  The patient has no prior cardiac history.  Patient denies any chest discomfort on exertion or at rest.  Patient denies any dyspnea at rest or on exertion, orthopnea, or PND.  Patient notes she has mild ankle edema that mostly resolves at night.  Patient also notes periods of dizziness which is been treated as vertigo.  They occur suddenly and feels like she is going to pass out.  She is not feeling palpitations at the time.  The patient has occasional palpitations but none recently.    ECG (02-Aug-2017):  Atrial fibrillation with Nonspecific ST-T wave abnormality.     Echo (07-Aug-2017):    1 - Normal left ventricular systolic function (EF 60-65%).     2 - No wall motion abnormalities.     3 - Normal left ventricular diastolic function.     4 - Normal right ventricular systolic function .     5 - Trivial mitral regurgitation.     6 - Trivial tricuspid regurgitation.     7 - Mild pulmonic regurgitation.     8 - The estimated PA systolic pressure is 31 mmHg.    Cardiac risk factors: female older than 55 years of age, hyperlipidemia, hypertension, tobacco use    Past Medical History:   Diagnosis Date    Anxiety     Arthritis     Cataract     Congestive heart failure     Dementia     Dry eye syndrome     Dry senile macular degeneration     Hypertension     Joint pain     Posterior capsular opacification, both eyes     Psychiatric problem     PVD (peripheral vascular disease)        Past Surgical History:   Procedure Laterality Date    CATARACT EXTRACTION      both eyes    HYSTERECTOMY      TOTAL KNEE  "ARTHROPLASTY      right       Social History   Substance Use Topics    Smoking status: Former Smoker     Types: Cigarettes     Quit date: 4/29/2005    Smokeless tobacco: Never Used    Alcohol use No       Outpatient Medications Prior to Visit   Medication Sig Dispense Refill    amlodipine (NORVASC) 5 MG tablet Take 1 tablet (5 mg total) by mouth 2 (two) times daily. 180 tablet 3    meclizine (ANTIVERT) 12.5 mg tablet Take 1 tablet (12.5 mg total) by mouth 2 (two) times daily as needed. 60 tablet 3    hydrochlorothiazide (MICROZIDE) 12.5 mg capsule Take 1 capsule (12.5 mg total) by mouth every Mon, Wed, Fri. 30 capsule 11     No facility-administered medications prior to visit.        Review of patient's allergies indicates:   Allergen Reactions    Lotensin [benazepril] Edema    Celexa [citalopram] Other (See Comments)     Hot flashes        Review of Systems   Constitution: Negative for weight gain and weight loss.   HENT: Negative for nosebleeds.    Eyes: Negative for vision loss in left eye and vision loss in right eye.   Cardiovascular: Negative for claudication.        As above   Respiratory: Negative for hemoptysis, shortness of breath, snoring, sputum production and wheezing.    Endocrine: Negative for polydipsia and polyuria.   Hematologic/Lymphatic: Does not bruise/bleed easily.   Musculoskeletal: Negative for myalgias.   Gastrointestinal: Negative for change in bowel habit, hematemesis, hematochezia, melena, nausea and vomiting.   Genitourinary: Negative for hematuria.   Neurological: Negative for focal weakness and numbness.     Objective:   Physical Exam   Constitutional: She is oriented to person, place, and time. She appears well-developed and well-nourished.   /64   Pulse 66   Ht 5' 8" (1.727 m)   Wt 79.4 kg (175 lb 0.7 oz)   BMI 26.62 kg/m²      Neck: Neck supple. No JVD present. Carotid bruit is present (soft right carotid bruit).   Cardiovascular: Normal rate, regular rhythm, " normal heart sounds and intact distal pulses.  Exam reveals no gallop and no friction rub.    No murmur heard.  Pulmonary/Chest: Effort normal and breath sounds normal. She has no wheezes. She has no rales.   Abdominal: Soft. Bowel sounds are normal. She exhibits no abdominal bruit. There is no hepatomegaly. There is no tenderness.   Musculoskeletal: She exhibits edema (race bilateral ankle edema).   Neurological: She is alert and oriented to person, place, and time. She has normal strength.   Skin: No cyanosis. Nails show no clubbing.       Lab Results   Component Value Date    WBC 6.56 04/10/2017    HGB 13.0 04/10/2017    HCT 39.6 04/10/2017    MCV 91 04/10/2017     04/10/2017       Lab Results   Component Value Date     08/02/2017    K 3.4 (L) 08/02/2017    BUN 9 08/02/2017    CREATININE 0.8 08/02/2017     08/02/2017    HGBA1C 5.8 12/22/2012    CHOL 216 (H) 09/16/2015    HDL 44 09/16/2015    LDLCALC 148.8 09/16/2015    TRIG 116 09/16/2015    CHOLHDL 20.4 09/16/2015    HGB 13.0 04/10/2017    HCT 39.6 04/10/2017     04/10/2017    INR 1.0 12/10/2014     ECG today shows normal sinus rhythm with first-degree AV block and minor nonspecific ST-T wave changes in the lateral precordial leads.  Assessment:     1. Paroxysmal atrial fibrillation    2. Peripheral edema    3. Essential hypertension    4. Dizziness      Patient has paroxysmal atrial fibrillation of unclear etiology.  The arrhythmia was noted on incidental ECG at her primary care physician's office.  Today she is in sinus rhythm.  It is not clear whether she is essentially asymptomatic from the atrial fibrillation.  She does get occasional palpitations but she did not seem to have any during the time when she was noted to be in atrial fibrillation on 02-August-2017.  She is currently back in sinus rhythm.  Her echocardiogram shows no significant abnormalities.  Her blood pressure is fairly well controlled.  Her WAA5S8-BIJd score is 3,  her HAS-BLED score is 1.  Therefore, she should most likely be anticoagulated.  However, the patient reluctant to go onto anticoagulation.  Therefore, she'll be started on aspirin 81 mg daily and undergo a 30 day event monitoring.  If there is any further episodes of atrial fibrillation on the event monitor, patient will be strongly encouraged to start anticoagulant therapy.  At this point, no other workup is needed as patient has no symptoms of ischemia and a normal echocardiogram.  Patient should return for reevaluation in 6 weeks.    Plan:     Dalila was seen today for joint swelling and atrial fibrillation.    Diagnoses and all orders for this visit:    Paroxysmal atrial fibrillation  -     aspirin (ECOTRIN) 81 MG EC tablet; Take 1 tablet (81 mg total) by mouth once daily.  -     Cardiac event monitor; Future    Peripheral edema    Essential hypertension    Dizziness

## 2017-08-21 NOTE — LETTER
August 21, 2017      Vazquez Marsh MD  [address]  [place] [address]           [facility]  [address]  [place] [address]  Phone: [phone]          Patient: Dalila Garcia   MR Number: [id number]   YOB: 1928   Date of Visit: 8/21/2017       Dear Dr. Vazquez Marsh:    Thank you for referring Dalila Garcia to me for evaluation. Attached you will find relevant portions of my assessment and plan of care.    If you have questions, please do not hesitate to call me. I look forward to following Dalila Garcia along with you.    Sincerely,    Jozef Fontanez MD    Enclosure  CC:  No Recipients    If you would like to receive this communication electronically, please contact [email or web address] or [phone] to request more information on [employer] Link access.    For providers and/or their staff who would like to refer a patient to [facility], please contact us through our one-stop-shop provider referral line, [facility], at [phone].    If you feel you have received this communication in error or would no longer like to receive these types of communications, please e-mail [email or web address]

## 2017-08-24 DIAGNOSIS — I10 ESSENTIAL HYPERTENSION: ICD-10-CM

## 2017-08-24 DIAGNOSIS — R60.0 LOWER EXTREMITY EDEMA: ICD-10-CM

## 2017-08-24 RX ORDER — HYDROXYZINE HYDROCHLORIDE 10 MG/1
TABLET, FILM COATED ORAL
Qty: 30 TABLET | Refills: 0 | Status: SHIPPED | OUTPATIENT
Start: 2017-08-24 | End: 2017-09-22 | Stop reason: SDUPTHER

## 2017-08-24 NOTE — TELEPHONE ENCOUNTER
----- Message from Maria Guadalupe Roper sent at 8/24/2017 12:22 PM CDT -----  Contact: Self/[phone]  RX request - refill or new RX.  Is this a refill or new RX:  Refill   RX name and strength: hydrOXYzine HCl (ATARAX) 10 MG Tab   Directions: Take 1 tablet (10 mg total) by mouth every evening. Sig 1-2 pills po qhs  Is this a 30 day or 90 day RX:  90 day supply  Pharmacy name and phone #: [facility] [id number]       [phone]  Comments:  Pt stated that she has broken out in hives. Please advise          Thanks!!!

## 2017-09-18 ENCOUNTER — PATIENT MESSAGE (OUTPATIENT)
Dept: CARDIOLOGY | Facility: CLINIC | Age: 82
End: 2017-09-18

## 2017-09-22 ENCOUNTER — OFFICE VISIT (OUTPATIENT)
Dept: INTERNAL MEDICINE | Facility: CLINIC | Age: 82
End: 2017-09-22
Payer: MEDICARE

## 2017-09-22 ENCOUNTER — LAB VISIT (OUTPATIENT)
Dept: LAB | Facility: HOSPITAL | Age: 82
End: 2017-09-22
Attending: INTERNAL MEDICINE
Payer: MEDICARE

## 2017-09-22 VITALS
HEIGHT: 68 IN | BODY MASS INDEX: 26.49 KG/M2 | WEIGHT: 174.81 LBS | SYSTOLIC BLOOD PRESSURE: 120 MMHG | DIASTOLIC BLOOD PRESSURE: 70 MMHG | HEART RATE: 64 BPM

## 2017-09-22 DIAGNOSIS — R21 RASH OF BODY: Primary | ICD-10-CM

## 2017-09-22 DIAGNOSIS — L29.9 ITCHING: ICD-10-CM

## 2017-09-22 DIAGNOSIS — I10 ESSENTIAL HYPERTENSION: ICD-10-CM

## 2017-09-22 DIAGNOSIS — R21 RASH OF BODY: ICD-10-CM

## 2017-09-22 LAB
ALBUMIN SERPL BCP-MCNC: 3.7 G/DL
ALP SERPL-CCNC: 97 U/L
ALT SERPL W/O P-5'-P-CCNC: 11 U/L
ANION GAP SERPL CALC-SCNC: 10 MMOL/L
AST SERPL-CCNC: 15 U/L
BASOPHILS # BLD AUTO: 0.06 K/UL
BASOPHILS NFR BLD: 1.2 %
BILIRUB SERPL-MCNC: 0.4 MG/DL
BUN SERPL-MCNC: 8 MG/DL
CALCIUM SERPL-MCNC: 9.1 MG/DL
CHLORIDE SERPL-SCNC: 109 MMOL/L
CO2 SERPL-SCNC: 23 MMOL/L
CREAT SERPL-MCNC: 0.8 MG/DL
DIFFERENTIAL METHOD: ABNORMAL
EOSINOPHIL # BLD AUTO: 0.1 K/UL
EOSINOPHIL NFR BLD: 2.1 %
ERYTHROCYTE [DISTWIDTH] IN BLOOD BY AUTOMATED COUNT: 14.6 %
EST. GFR  (AFRICAN AMERICAN): >60 ML/MIN/1.73 M^2
EST. GFR  (NON AFRICAN AMERICAN): >60 ML/MIN/1.73 M^2
GLUCOSE SERPL-MCNC: 98 MG/DL
HCT VFR BLD AUTO: 40.8 %
HGB BLD-MCNC: 13.4 G/DL
LYMPHOCYTES # BLD AUTO: 1.5 K/UL
LYMPHOCYTES NFR BLD: 28.1 %
MCH RBC QN AUTO: 29.3 PG
MCHC RBC AUTO-ENTMCNC: 32.8 G/DL
MCV RBC AUTO: 89 FL
MONOCYTES # BLD AUTO: 0.6 K/UL
MONOCYTES NFR BLD: 11.2 %
NEUTROPHILS # BLD AUTO: 3 K/UL
NEUTROPHILS NFR BLD: 57.2 %
PLATELET # BLD AUTO: 281 K/UL
PMV BLD AUTO: 10.6 FL
POTASSIUM SERPL-SCNC: 3.9 MMOL/L
PROT SERPL-MCNC: 7.4 G/DL
RBC # BLD AUTO: 4.58 M/UL
SODIUM SERPL-SCNC: 142 MMOL/L
TSH SERPL DL<=0.005 MIU/L-ACNC: 1.23 UIU/ML
WBC # BLD AUTO: 5.2 K/UL

## 2017-09-22 PROCEDURE — 99214 OFFICE O/P EST MOD 30 MIN: CPT | Mod: S$GLB,,, | Performed by: INTERNAL MEDICINE

## 2017-09-22 PROCEDURE — 1126F AMNT PAIN NOTED NONE PRSNT: CPT | Mod: S$GLB,,, | Performed by: INTERNAL MEDICINE

## 2017-09-22 PROCEDURE — 3008F BODY MASS INDEX DOCD: CPT | Mod: S$GLB,,, | Performed by: INTERNAL MEDICINE

## 2017-09-22 PROCEDURE — 1159F MED LIST DOCD IN RCRD: CPT | Mod: S$GLB,,, | Performed by: INTERNAL MEDICINE

## 2017-09-22 PROCEDURE — 99999 PR PBB SHADOW E&M-EST. PATIENT-LVL III: CPT | Mod: PBBFAC,,, | Performed by: INTERNAL MEDICINE

## 2017-09-22 PROCEDURE — 84443 ASSAY THYROID STIM HORMONE: CPT

## 2017-09-22 PROCEDURE — 86593 SYPHILIS TEST NON-TREP QUANT: CPT

## 2017-09-22 PROCEDURE — 86592 SYPHILIS TEST NON-TREP QUAL: CPT

## 2017-09-22 PROCEDURE — 36415 COLL VENOUS BLD VENIPUNCTURE: CPT

## 2017-09-22 PROCEDURE — 85025 COMPLETE CBC W/AUTO DIFF WBC: CPT

## 2017-09-22 PROCEDURE — 86780 TREPONEMA PALLIDUM: CPT

## 2017-09-22 PROCEDURE — 80053 COMPREHEN METABOLIC PANEL: CPT

## 2017-09-22 PROCEDURE — 99499 UNLISTED E&M SERVICE: CPT | Mod: S$GLB,,, | Performed by: INTERNAL MEDICINE

## 2017-09-22 RX ORDER — HYDROXYZINE HYDROCHLORIDE 10 MG/1
10 TABLET, FILM COATED ORAL 2 TIMES DAILY PRN
Qty: 60 TABLET | Refills: 3 | Status: SHIPPED | OUTPATIENT
Start: 2017-09-22 | End: 2019-02-04

## 2017-09-22 NOTE — PROGRESS NOTES
CHIEF COMPLAINT:  Followup of blood pressure, plus rash.    HISTORY OF PRESENT ILLNESS:  The patient is an 89-year-old female who we see for   hypertension who comes in today for followup.  On last visit, the patient was   placed on HCTZ 12.5 mg one p.o. every day.  The patient never filled the   prescription or at least she never took it.  The patient stays on amlodipine   once a day.  The patient was evaluated by Cardiology for PAF.  No new medication   changes at this time except for the addition of aspirin.  The patient does   report a rash.  It is on her arms and upper back.  It is itchy.  She has been   taking Atarax 10 mg once a day, which seems to work.  She found it twice a day   knocks it out.    REVIEW OF SYSTEMS:  No chest pain, no shortness of breath, no wheezing, no   nausea or vomiting, no abdominal pain, no bowel changes, no diarrhea.    PHYSICAL EXAMINATION:  GENERAL APPEARANCE:  No acute distress.  HEENT:  Conjunctivae clear.  She has bilateral lens replacements.  TMs are   clear.  Nasal septum is midline without discharge.  Oropharynx shows moist   mucous membranes.  She does have dentures in place.  NECK:  Trachea is midline without JVD.  PULMONARY:  Good inspiratory, expiratory breath sounds are heard.  Lungs are   clear to auscultation.  CARDIOVASCULAR:  S1, S2.  EXTREMITIES:  Without edema.  ABDOMEN:  Nontender, nondistended, without hepatosplenomegaly.  SKIN:  The patient's skin was inspected.  The patient did have a fading rash on   the upper arms and upper back.    ASSESSMENT:  1.  Rash.  2.  Hypertension, currently stable off of HCTZ.  3.  Pruritus.    PLAN:  We will put the patient in for CBC, CMP, RPR, TSH.  The patient will have   her Atarax increase to twice a day.  She is to follow up pending results.      JDS/HN  dd: 09/22/2017 08:49:53 (CDT)  td: 09/22/2017 13:03:34 (CDT)  Doc ID   #[id number]  Job ID #[id number]    CC:

## 2017-09-23 LAB
RPR SER QL: REACTIVE
RPR SER-TITR: ABNORMAL {TITER}

## 2017-09-26 DIAGNOSIS — A53.0 POSITIVE RPR TEST: Primary | ICD-10-CM

## 2017-09-26 LAB — T PALLIDUM AB SER QL IF: REACTIVE

## 2017-10-02 ENCOUNTER — PATIENT MESSAGE (OUTPATIENT)
Dept: INTERNAL MEDICINE | Facility: CLINIC | Age: 82
End: 2017-10-02

## 2017-10-02 ENCOUNTER — OFFICE VISIT (OUTPATIENT)
Dept: CARDIOLOGY | Facility: CLINIC | Age: 82
End: 2017-10-02
Payer: MEDICARE

## 2017-10-02 ENCOUNTER — TELEPHONE (OUTPATIENT)
Dept: INTERNAL MEDICINE | Facility: CLINIC | Age: 82
End: 2017-10-02

## 2017-10-02 VITALS
HEART RATE: 63 BPM | WEIGHT: 174.38 LBS | SYSTOLIC BLOOD PRESSURE: 126 MMHG | BODY MASS INDEX: 26.43 KG/M2 | DIASTOLIC BLOOD PRESSURE: 60 MMHG | HEIGHT: 68 IN

## 2017-10-02 DIAGNOSIS — I70.0 ATHEROSCLEROSIS OF AORTA: ICD-10-CM

## 2017-10-02 DIAGNOSIS — I48.0 PAROXYSMAL ATRIAL FIBRILLATION: Primary | Chronic | ICD-10-CM

## 2017-10-02 DIAGNOSIS — I50.32 CHRONIC DIASTOLIC CONGESTIVE HEART FAILURE: Chronic | ICD-10-CM

## 2017-10-02 DIAGNOSIS — I10 ESSENTIAL HYPERTENSION: ICD-10-CM

## 2017-10-02 PROCEDURE — 1159F MED LIST DOCD IN RCRD: CPT | Mod: S$GLB,,, | Performed by: NURSE PRACTITIONER

## 2017-10-02 PROCEDURE — 99999 PR PBB SHADOW E&M-EST. PATIENT-LVL III: CPT | Mod: PBBFAC,,, | Performed by: NURSE PRACTITIONER

## 2017-10-02 PROCEDURE — 1126F AMNT PAIN NOTED NONE PRSNT: CPT | Mod: S$GLB,,, | Performed by: NURSE PRACTITIONER

## 2017-10-02 PROCEDURE — 99214 OFFICE O/P EST MOD 30 MIN: CPT | Mod: S$GLB,,, | Performed by: NURSE PRACTITIONER

## 2017-10-02 PROCEDURE — 99499 UNLISTED E&M SERVICE: CPT | Mod: S$GLB,,, | Performed by: NURSE PRACTITIONER

## 2017-10-02 PROCEDURE — 3008F BODY MASS INDEX DOCD: CPT | Mod: S$GLB,,, | Performed by: NURSE PRACTITIONER

## 2017-10-02 NOTE — PROGRESS NOTES
Ms. Garcia is a patient of Dr. Fontanez and was last seen in [facility] 8/21/2017.    Subjective:   Patient ID:  Dalila Garcia is a 89 y.o. female who presents for follow-up of Atrial Fibrillation (6 week f/u )    Problems:  Dementia  HFpEF, EF 60-65%  HTN  PVD  Former smoker, quit in 2005  Paroxysmal atrial fibrillation    HPI  Ms. Garcia is in clinic for routine follow up of atrial fibrillation.  At her last visit, she was reluctant to start anticoagulation therapy.  She completed a 30 day event monitor and no atrial fibrillation was noted.  Patient denies chest pain with exertion or at rest, palpitations, SOB, AGUIRRE, dizziness, syncope, edema, orthopnea, PND, or claudication.  Reports no routine exercise.  CHADSVASC 4 or 4% risk.  HASBLED score 3, 5.8% risk of bleeding.      Review of Systems   Constitution: Negative for decreased appetite, diaphoresis, weakness, malaise/fatigue, weight gain and weight loss.   Eyes: Negative for visual disturbance.   Cardiovascular: Negative for chest pain, claudication, dyspnea on exertion, irregular heartbeat, leg swelling, near-syncope, orthopnea, palpitations, paroxysmal nocturnal dyspnea and syncope.        Denies chest pressure   Respiratory: Negative for cough, hemoptysis, shortness of breath, sleep disturbances due to breathing and snoring.    Endocrine: Negative for cold intolerance and heat intolerance.   Hematologic/Lymphatic: Negative for bleeding problem. Does not bruise/bleed easily.   Musculoskeletal: Negative for myalgias.   Gastrointestinal: Negative for bloating, abdominal pain, anorexia, change in bowel habit, constipation, diarrhea, nausea and vomiting.   Neurological: Negative for difficulty with concentration, disturbances in coordination, excessive daytime sleepiness, dizziness, headaches, light-headedness, loss of balance and numbness.   Psychiatric/Behavioral: The patient does not have insomnia.      Allergies and current medications updated and  "reviewed:  Review of patient's allergies indicates:   Allergen Reactions    Lotensin [benazepril] Edema    Celexa [citalopram] Other (See Comments)     Hot flashes      Current Outpatient Prescriptions   Medication Sig    amlodipine (NORVASC) 5 MG tablet Take 1 tablet (5 mg total) by mouth 2 (two) times daily.    aspirin (ECOTRIN) 81 MG EC tablet Take 1 tablet (81 mg total) by mouth once daily.    hydrOXYzine HCl (ATARAX) 10 MG Tab Take 1 tablet (10 mg total) by mouth 2 (two) times daily as needed.    meclizine (ANTIVERT) 12.5 mg tablet Take 1 tablet (12.5 mg total) by mouth 2 (two) times daily as needed.     No current facility-administered medications for this visit.      Objective:     Right Arm BP - Sittin/56 (10/02/17 1042)  Left Arm BP - Sittin/60 (10/02/17 1042)    /60 (BP Location: Left arm, Patient Position: Sitting, BP Method: Medium (Automatic))   Pulse 63   Ht 5' 8" (1.727 m)   Wt 79.1 kg (174 lb 6.1 oz)   BMI 26.51 kg/m²     Physical Exam   Constitutional: She is oriented to person, place, and time. Vital signs are normal. She appears well-developed and well-nourished. She is active. No distress.   HENT:   Head: Normocephalic and atraumatic.   Eyes: Conjunctivae and lids are normal. No scleral icterus.   Neck: Neck supple. Normal carotid pulses, no hepatojugular reflux and no JVD present. Carotid bruit is not present.   Cardiovascular: Normal rate, regular rhythm, S1 normal, S2 normal and intact distal pulses.  PMI is not displaced.  Exam reveals no gallop and no friction rub.    No murmur heard.  Pulses:       Carotid pulses are 2+ on the right side, and 2+ on the left side.       Radial pulses are 2+ on the right side, and 2+ on the left side.        Dorsalis pedis pulses are 2+ on the right side, and 2+ on the left side.        Posterior tibial pulses are 1+ on the right side, and 1+ on the left side.   Pulmonary/Chest: Effort normal and breath sounds normal. No " respiratory distress. She has no decreased breath sounds. She has no wheezes. She has no rhonchi. She has no rales. She exhibits no tenderness.   Abdominal: Soft. Normal appearance and bowel sounds are normal. She exhibits no distension, no fluid wave, no abdominal bruit, no ascites and no pulsatile midline mass. There is no hepatosplenomegaly. There is no tenderness.   Musculoskeletal: She exhibits no edema.   Neurological: She is alert and oriented to person, place, and time. Gait normal.   Skin: Skin is warm, dry and intact. No rash noted. She is not diaphoretic. Nails show no clubbing.   Psychiatric: She has a normal mood and affect. Her speech is normal and behavior is normal. Judgment and thought content normal. Cognition and memory are normal.   Nursing note and vitals reviewed.      Chemistry        Component Value Date/Time     09/22/2017 0854    K 3.9 09/22/2017 0854     09/22/2017 0854    CO2 23 09/22/2017 0854    BUN 8 09/22/2017 0854    CREATININE 0.8 09/22/2017 0854    GLU 98 09/22/2017 0854        Component Value Date/Time    CALCIUM 9.1 09/22/2017 0854    ALKPHOS 97 09/22/2017 0854    AST 15 09/22/2017 0854    ALT 11 09/22/2017 0854    BILITOT 0.4 09/22/2017 0854    ESTGFRAFRICA >60 09/22/2017 0854    EGFRNONAA >60 09/22/2017 0854          Recent Labs  Lab 09/16/15  1012  09/22/17  0854   WHITE BLOOD CELL COUNT  --   < > 5.20   HEMOGLOBIN  --   < > 13.4   HEMATOCRIT  --   < > 40.8   MCV  --   < > 89   PLATELETS  --   < > 281   TSH 1.187  < > 1.230   CHOLESTEROL 216 H  --   --    HDL 44  --   --    LDL CHOLESTEROL 148.8  --   --    TRIGLYCERIDES 116  --   --    HDL/CHOLESTEROL RATIO 20.4  --   --    < > = values in this interval not displayed.      Recent Labs  Lab 12/10/14  1704   INR 1.0      Test(s) Reviewed  I have reviewed the following in detail:  [] Stress test   [] Angiography   [x] Echocardiogram   [x] Labs   [] Other:         Assessment/Plan:     Paroxysmal atrial  fibrillation  Comments:  Single event on 8/2/17. No Afib noted on 30 day event monitor. Risk of anticoagulation outweighs risk for bleeding.     Atherosclerosis of aorta    Chronic diastolic congestive heart failure  Comments:  No s/s of heart failure noted. 2gm sodium diet. Not currently taking diuretics.    Essential hypertension  Comments:  Well controlled on CCB. Continue amlodipine.       Return As needed.

## 2017-10-02 NOTE — PATIENT INSTRUCTIONS
Restrict salt to no more than 2,000mg a day  -No more than 100mg of salt in a snack  -No more than 250mg of salt in an entree  -No more than 500mg of salt in an entire meal

## 2017-10-02 NOTE — TELEPHONE ENCOUNTER
----- Message from Marzena Elma sent at 9/28/2017 10:59 AM CDT -----  Contact: dh [phone]  Patient is returning a phone call.  Who left a message for the patient: Maximo Navarrete  Does patient know what this is regarding:  A message was left in regards to test results that the Dr found something in her blood.  Comments:

## 2017-10-02 NOTE — TELEPHONE ENCOUNTER
----- Message from Vazquez Marsh MD sent at 10/2/2017  7:06 AM CDT -----  Please contact patient. Notify that I did talk to one of our Infectious Disease Doctors. No treatment is needed.

## 2018-02-20 DIAGNOSIS — I48.0 PAROXYSMAL ATRIAL FIBRILLATION: ICD-10-CM

## 2018-02-20 RX ORDER — ASPIRIN 81 MG/1
TABLET ORAL
Qty: 30 TABLET | Refills: 0 | Status: SHIPPED | OUTPATIENT
Start: 2018-02-20 | End: 2022-07-28

## 2018-03-22 NOTE — PROGRESS NOTES
CHIEF COMPLAINT:  Followup of blood pressure plus facial edema.    HISTORY OF PRESENT ILLNESS:  The patient is an 88-year-old female with a history   of hypertension who comes in today for followup of facial edema plus   hypertension.  The patient was started on metoprolol 25 mg 1/2 tablet b.i.d.    She reports the swelling in her face is doing much better.  No problems with   medications reported.    REVIEW OF SYSTEMS:  The patient has no new complaints.  She still has some   dizziness about once a week.  It is not any better and not any worse.  She does   check her blood pressure at home.  She brought her blood pressure monitor with   her today.    PHYSICAL EXAMINATION:  GENERAL APPEARANCE:  No acute distress.  VITAL SIGNS:  Blood pressure taken by myself was 122/70.  With her machine, it   was 128/61.  HEENT:  Conjunctivae are clear.  She does have bilateral lens replacements.  TMs   look clear.  Nasal septum is midline without discharge.  Oropharynx is without   erythema.  PULMONARY:  Good inspiratory and expiratory breath sounds are heard.  Lungs are   clear to auscultation.  CARDIOVASCULAR:  S1 and S2.  EXTREMITIES:  Without edema.  ABDOMEN:  Nontender, nondistended and without hepatosplenomegaly.    ASSESSMENT:  1.  Facial edema, currently resolved.  2.  Hypertension, currently stable.    PLAN:  No change in medications at this time.  She is to follow up in 3 months   for reevaluation.      NELSON/PENNY  dd: 02/10/2017 10:21:42 (CST)  td: 02/11/2017 01:06:31 (CST)  Doc ID   #[id number]  Job ID #[id number]    CC:        no indicators present

## 2018-03-29 ENCOUNTER — PES CALL (OUTPATIENT)
Dept: ADMINISTRATIVE | Facility: CLINIC | Age: 83
End: 2018-03-29

## 2018-04-18 ENCOUNTER — TELEPHONE (OUTPATIENT)
Dept: INTERNAL MEDICINE | Facility: CLINIC | Age: 83
End: 2018-04-18

## 2018-04-18 NOTE — TELEPHONE ENCOUNTER
----- Message from Kinjal Regalado sent at 4/18/2018  2:32 PM CDT -----  Contact: Pt [phone]  Patient would like to get a blood test for sugar diabetes, she said the reason why she would like to have it because it runs in her family.

## 2018-05-02 ENCOUNTER — OFFICE VISIT (OUTPATIENT)
Dept: INTERNAL MEDICINE | Facility: CLINIC | Age: 83
End: 2018-05-02
Payer: MEDICARE

## 2018-05-02 VITALS
DIASTOLIC BLOOD PRESSURE: 70 MMHG | WEIGHT: 177.69 LBS | SYSTOLIC BLOOD PRESSURE: 132 MMHG | BODY MASS INDEX: 26.93 KG/M2 | HEART RATE: 70 BPM | HEIGHT: 68 IN

## 2018-05-02 DIAGNOSIS — I10 ESSENTIAL HYPERTENSION: Primary | ICD-10-CM

## 2018-05-02 DIAGNOSIS — R42 DIZZINESSES: ICD-10-CM

## 2018-05-02 PROCEDURE — 93005 ELECTROCARDIOGRAM TRACING: CPT | Mod: S$GLB,,, | Performed by: INTERNAL MEDICINE

## 2018-05-02 PROCEDURE — 93010 ELECTROCARDIOGRAM REPORT: CPT | Mod: S$GLB,,, | Performed by: INTERNAL MEDICINE

## 2018-05-02 PROCEDURE — 99999 PR PBB SHADOW E&M-EST. PATIENT-LVL III: CPT | Mod: PBBFAC,,, | Performed by: INTERNAL MEDICINE

## 2018-05-02 PROCEDURE — 99213 OFFICE O/P EST LOW 20 MIN: CPT | Mod: S$GLB,,, | Performed by: INTERNAL MEDICINE

## 2018-05-02 PROCEDURE — 99499 UNLISTED E&M SERVICE: CPT | Mod: S$GLB,,, | Performed by: INTERNAL MEDICINE

## 2018-05-02 NOTE — PROGRESS NOTES
CHIEF COMPLAINT:  Checkup.    HISTORY OF PRESENT ILLNESS:  The patient is a 89-year-old female who we see for   hypertension.  She also has a history of AFib in the past.  Last EKG showed she   is back into sinus rhythm.  She comes in today for checkup.  She does report she   still has some dizziness but is not as bad as it used to be.  What had her   concern was that her son who is 71 years of age, was just diagnosed with   diabetes.    REVIEW OF SYSTEMS:  The patient reports her weight has been stable.  No chest   pain, no shortness of breath, no nausea or vomiting, no abdominal pain.  She   does have a history of a positive RPR and FTA antibody.  She was treated at 16   years of age for syphilis.  She was also treated again when she had her first   child.    PHYSICAL EXAMINATION:  GENERAL APPEARANCE:  No acute distress.  HEENT:  Conjunctivae clear.  She has bilateral lens replacements.  TMs are   partially obscured by wax.  Nasal septum was midline.  Oropharynx is without   erythema.  The patient does have dentures in place.  PULMONARY:  Good inspiratory and expiratory breath sounds are heard.  Lungs are   clear to auscultation.  CARDIOVASCULAR:  S1, S2.  EXTREMITIES:  With trace edema.  ABDOMEN:  Nontender, nondistended, without hepatosplenomegaly.    ASSESSMENT:  1.  Hypertension, currently stable.  2.  Dizziness.    PLAN:  We will go ahead and check an EKG, CBC, CMP today.  She is to follow up   pending results.      ADONAY  dd: 05/02/2018 11:04:24 (CDT)  td: 05/03/2018 07:03:39 (CDT)  Doc ID   #[id number]  Job ID #[id number]    CC:

## 2018-05-06 DIAGNOSIS — R94.31 EKG ABNORMALITIES: Primary | ICD-10-CM

## 2018-05-06 DIAGNOSIS — I21.29 SEPTAL INFARCTION: ICD-10-CM

## 2018-05-07 ENCOUNTER — TELEPHONE (OUTPATIENT)
Dept: INTERNAL MEDICINE | Facility: CLINIC | Age: 83
End: 2018-05-07

## 2018-05-07 NOTE — TELEPHONE ENCOUNTER
----- Message from Vazquez Marsh MD sent at 5/6/2018  3:54 PM CDT -----  Please call the patient regarding her abnormal result. Please contact patient. Her EKG did show some abnormalities. I would like to get a cardiac echo of her heart. Orders are in.

## 2018-05-11 ENCOUNTER — HOSPITAL ENCOUNTER (OUTPATIENT)
Dept: CARDIOLOGY | Facility: CLINIC | Age: 83
Discharge: HOME | End: 2018-05-11
Attending: INTERNAL MEDICINE
Payer: MEDICARE

## 2018-05-11 DIAGNOSIS — I21.29 SEPTAL INFARCTION: ICD-10-CM

## 2018-05-11 DIAGNOSIS — R94.31 EKG ABNORMALITIES: ICD-10-CM

## 2018-05-11 LAB
ESTIMATED PA SYSTOLIC PRESSURE: 24.34
RETIRED EF AND QEF - SEE NOTES: 60 (ref 55–65)

## 2018-05-11 PROCEDURE — 93306 TTE W/DOPPLER COMPLETE: CPT | Mod: S$GLB,,, | Performed by: INTERNAL MEDICINE

## 2018-05-14 ENCOUNTER — TELEPHONE (OUTPATIENT)
Dept: INTERNAL MEDICINE | Facility: CLINIC | Age: 83
End: 2018-05-14

## 2018-05-14 RX ORDER — AMLODIPINE BESYLATE 5 MG/1
TABLET ORAL
Qty: 180 TABLET | Refills: 3 | Status: SHIPPED | OUTPATIENT
Start: 2018-05-14 | End: 2019-02-04 | Stop reason: SDUPTHER

## 2018-05-14 NOTE — TELEPHONE ENCOUNTER
----- Message from Oscarblack Melissa sent at 5/14/2018  1:04 PM CDT -----  Contact: self/[phone]  Pt states that the medication amlodipine (NORVASC) 5 MG tablet has not been called in yet. She states that she came in for a visit on 5/2 and forgot to send the medication into [facility] [address] [place] [address] [address] [facility]. Pt is requesting that the medication above be sent into the pharmacy above. Please advise.    Thanks

## 2018-05-14 NOTE — TELEPHONE ENCOUNTER
"----- Message from Yoli Guzman sent at 5/14/2018  9:17 AM CDT -----  Contact: self   RX request - refill or new RX.  Is this a refill or new RX:  Refill   RX name and strength: amlodipine (NORVASC) 5 MG tablet  Directions: Take 1 tablet (5 mg total) by mouth 2 (two) times daily.  Is this a 30 day or 90 day RX:  90  Pharmacy name and phone # (DON'T enter "on file" or "in chart"): [facility] [id number] - [phone] (Phone)  [phone] (Fax)  Comments:  Pt only has 4 pills left         "

## 2018-07-31 ENCOUNTER — TELEPHONE (OUTPATIENT)
Dept: INTERNAL MEDICINE | Facility: CLINIC | Age: 83
End: 2018-07-31

## 2018-07-31 DIAGNOSIS — Z12.31 VISIT FOR SCREENING MAMMOGRAM: Primary | ICD-10-CM

## 2018-07-31 NOTE — TELEPHONE ENCOUNTER
----- Message from René Morales sent at 7/31/2018  9:06 AM CDT -----  Contact: Patient [phone]  Type: Orders Request    What orders/ testing are being requested? MammoGram    Is there a future appointment scheduled for the patient with PCP? No    When?    Comments: Patient requesting office to give a call to inform Orders are ready to schedule, patient has received the Recall letter in mail but no Orders are linked.    Please call an advise  Thank you

## 2018-07-31 NOTE — TELEPHONE ENCOUNTER
Attempted to contact pt no success, busy on home number but left voice message on mobile.  Left number to Imaging to call and schedule appt.

## 2018-08-07 ENCOUNTER — NURSE TRIAGE (OUTPATIENT)
Dept: ADMINISTRATIVE | Facility: CLINIC | Age: 83
End: 2018-08-07

## 2018-08-07 NOTE — TELEPHONE ENCOUNTER
Reason for Disposition   SEVERE dizziness (e.g., unable to stand, requires support to walk, feels like passing out now)    Protocols used:  DIZZINESS-A-OH    Dalila is calling for an appointment with  and was transferred to [facility] by scheduling.  Dalila reports dizziness.  States she is so dizzy she can not stand and walk.  Per protocol advised ER.  Dalila refuses to go to ER and wants to  Come in to see Dr. Marsh.  Please contact Dalila to advise.  She can be reached at [phone].

## 2018-08-07 NOTE — TELEPHONE ENCOUNTER
Spoke with pt informed her that PCP is out of the office the rest of the week and that the ER would be beneficial. Pt stated that she will hold off because she does not want to wait for hours before being seen, pt also stated that if she start to feel bad she will go to ER.

## 2018-08-22 ENCOUNTER — HOSPITAL ENCOUNTER (OUTPATIENT)
Dept: RADIOLOGY | Facility: HOSPITAL | Age: 83
Discharge: HOME | End: 2018-08-22
Attending: INTERNAL MEDICINE
Payer: MEDICARE

## 2018-08-22 DIAGNOSIS — Z12.31 VISIT FOR SCREENING MAMMOGRAM: ICD-10-CM

## 2018-08-22 PROCEDURE — 77067 SCR MAMMO BI INCL CAD: CPT | Mod: TC,PO

## 2018-08-22 PROCEDURE — 77067 SCR MAMMO BI INCL CAD: CPT | Mod: 26,,, | Performed by: RADIOLOGY

## 2019-01-11 ENCOUNTER — TELEPHONE (OUTPATIENT)
Dept: INTERNAL MEDICINE | Facility: CLINIC | Age: 84
End: 2019-01-11

## 2019-01-11 NOTE — TELEPHONE ENCOUNTER
----- Message from René Morales sent at 1/11/2019 10:32 AM CST -----  Contact: Patient [phone]  RX request - refill or new RX.  Is this a refill or new RX:  Refill  RX name and strength: Hip Pain Rx     Pharmacy name and phone # ([facility] [employer] [facility] [id number] [facility] [place], [place] [address] [facility] [address] [phone] (Phone)  [phone] (Fax)    Comments:  Requesting for a call back to inform Rx has been sent to pharmacy    Please call an advise  Thank you

## 2019-01-11 NOTE — TELEPHONE ENCOUNTER
Spoke with pt informed that since she has not been seen since 05/2018 she will need to be seen and examined for pain medication. Pt scheduled appt and refused urgent with another Provider.

## 2019-02-04 ENCOUNTER — LAB VISIT (OUTPATIENT)
Dept: LAB | Facility: HOSPITAL | Age: 84
End: 2019-02-04
Attending: INTERNAL MEDICINE
Payer: MEDICARE

## 2019-02-04 ENCOUNTER — OFFICE VISIT (OUTPATIENT)
Dept: INTERNAL MEDICINE | Facility: CLINIC | Age: 84
End: 2019-02-04
Payer: MEDICARE

## 2019-02-04 VITALS
OXYGEN SATURATION: 98 % | HEART RATE: 66 BPM | HEIGHT: 68 IN | BODY MASS INDEX: 27.06 KG/M2 | DIASTOLIC BLOOD PRESSURE: 70 MMHG | WEIGHT: 178.56 LBS | SYSTOLIC BLOOD PRESSURE: 124 MMHG | TEMPERATURE: 98 F

## 2019-02-04 DIAGNOSIS — I10 ESSENTIAL HYPERTENSION: Primary | ICD-10-CM

## 2019-02-04 DIAGNOSIS — M16.12 PRIMARY OSTEOARTHRITIS OF LEFT HIP: ICD-10-CM

## 2019-02-04 DIAGNOSIS — I10 ESSENTIAL HYPERTENSION: ICD-10-CM

## 2019-02-04 LAB
ANION GAP SERPL CALC-SCNC: 9 MMOL/L
BUN SERPL-MCNC: 8 MG/DL
CALCIUM SERPL-MCNC: 9.3 MG/DL
CHLORIDE SERPL-SCNC: 107 MMOL/L
CO2 SERPL-SCNC: 25 MMOL/L
CREAT SERPL-MCNC: 0.8 MG/DL
EST. GFR  (AFRICAN AMERICAN): >60 ML/MIN/1.73 M^2
EST. GFR  (NON AFRICAN AMERICAN): >60 ML/MIN/1.73 M^2
GLUCOSE SERPL-MCNC: 98 MG/DL
POTASSIUM SERPL-SCNC: 3.6 MMOL/L
SODIUM SERPL-SCNC: 141 MMOL/L

## 2019-02-04 PROCEDURE — 99499 UNLISTED E&M SERVICE: CPT | Mod: HCNC,S$GLB,, | Performed by: INTERNAL MEDICINE

## 2019-02-04 PROCEDURE — 99999 PR PBB SHADOW E&M-EST. PATIENT-LVL III: CPT | Mod: PBBFAC,HCNC,, | Performed by: INTERNAL MEDICINE

## 2019-02-04 PROCEDURE — 99999 PR PBB SHADOW E&M-EST. PATIENT-LVL III: ICD-10-PCS | Mod: PBBFAC,HCNC,, | Performed by: INTERNAL MEDICINE

## 2019-02-04 PROCEDURE — 1101F PR PT FALLS ASSESS DOC 0-1 FALLS W/OUT INJ PAST YR: ICD-10-PCS | Mod: HCNC,CPTII,S$GLB, | Performed by: INTERNAL MEDICINE

## 2019-02-04 PROCEDURE — 80048 BASIC METABOLIC PNL TOTAL CA: CPT | Mod: HCNC

## 2019-02-04 PROCEDURE — 1101F PT FALLS ASSESS-DOCD LE1/YR: CPT | Mod: HCNC,CPTII,S$GLB, | Performed by: INTERNAL MEDICINE

## 2019-02-04 PROCEDURE — 99499 RISK ADDL DX/OHS AUDIT: ICD-10-PCS | Mod: HCNC,S$GLB,, | Performed by: INTERNAL MEDICINE

## 2019-02-04 PROCEDURE — 99214 PR OFFICE/OUTPT VISIT, EST, LEVL IV, 30-39 MIN: ICD-10-PCS | Mod: HCNC,S$GLB,, | Performed by: INTERNAL MEDICINE

## 2019-02-04 PROCEDURE — 36415 COLL VENOUS BLD VENIPUNCTURE: CPT | Mod: HCNC

## 2019-02-04 PROCEDURE — 99214 OFFICE O/P EST MOD 30 MIN: CPT | Mod: HCNC,S$GLB,, | Performed by: INTERNAL MEDICINE

## 2019-02-04 RX ORDER — AMLODIPINE BESYLATE 5 MG/1
TABLET ORAL
Qty: 180 TABLET | Refills: 3 | Status: SHIPPED | OUTPATIENT
Start: 2019-02-04 | End: 2020-02-05 | Stop reason: SDUPTHER

## 2019-02-04 RX ORDER — DICLOFENAC SODIUM 10 MG/G
2 GEL TOPICAL 3 TIMES DAILY
Qty: 100 G | Refills: 1 | Status: SHIPPED | OUTPATIENT
Start: 2019-02-04 | End: 2022-07-28

## 2019-02-04 RX ORDER — ACETAMINOPHEN 500 MG
500 TABLET ORAL EVERY 6 HOURS PRN
Qty: 60 TABLET | Refills: 0 | Status: SHIPPED | OUTPATIENT
Start: 2019-02-04 | End: 2022-07-28

## 2019-02-04 NOTE — PROGRESS NOTES
CHIEF COMPLAINT:  Followup.    HISTORY OF PRESENT ILLNESS:  The patient is a 89-year-old female who we see for   hypertension as well as hip pain, who comes in today for followup.  The patient   has DJD of the left hip.  The patient is asking for a refill of Motrin 800 mg.    She states she has been taking one tablet on average a month as needed for   severe pain.  On clarification, she says it is not a pain it is a soreness,   which is there all the time, but sometimes it gets to be bad.  She has not tried   Tylenol nor she tried any medication such as diclofenac gel.  In regards to the   patient's blood pressure, she does check it at home.  She did check her blood   pressure this morning prior to coming in around 7:00, it was around 145/70.  The   patient takes her blood pressure medication around 7:00 in the morning.    REVIEW OF SYSTEMS:  Weight has been staying stable.  No chest pain, no shortness   of breath.  The patient sleeps on her right side.  No nausea, vomiting, no   abdominal pain.  She does complain of left hip discomfort.  The patient does not   want surgical intervention at this time.    PHYSICAL EXAMINATION:  GENERAL APPEARANCE:  No acute distress.  HEENT:  Trachea is midline without JVD.  PULMONARY:  Good inspiratory, expiratory breath sounds are heard.  Lungs are   clear to auscultation.  CARDIOVASCULAR:  S1, S2.  Rhythm appears to be normal.  EXTREMITIES:  Without edema.  ABDOMEN:  Nontender, nondistended, without hepatosplenomegaly.  A left hip exam was performed.  The patient reports no discomfort with attempts   at internal and external rotation of the hip; however, this was limited.  The   patient had no pain when abduction and adduction against resistance.    ASSESSMENT:  1.  Hypertension, repeat blood pressure taken by myself was 124/70.  2.  DJD of the left hip.  The patient declines surgical intervention at this   time.    PLAN:  We will put the patient in for a BMP today.  The patient  was asked to try   plain Tylenol.  Prescription was sent to her pharmacy.  Also Voltaren gel was   sent to her pharmacy.  This medication was discussed with her.  She is to follow   up in four months for a physical.      NELSON/PENNY  dd: 02/04/2019 10:11:31 (CST)  td: 02/05/2019 00:40:05 (CST)  Doc ID   #[id number]  Job ID #[id number]    CC:

## 2019-02-05 DIAGNOSIS — Z00.00 ANNUAL PHYSICAL EXAM: ICD-10-CM

## 2019-02-05 DIAGNOSIS — I48.91 NEW ONSET A-FIB: ICD-10-CM

## 2019-02-05 DIAGNOSIS — R60.0 LOWER EXTREMITY EDEMA: ICD-10-CM

## 2019-02-05 DIAGNOSIS — M16.12 PRIMARY OSTEOARTHRITIS OF LEFT HIP: ICD-10-CM

## 2019-02-05 DIAGNOSIS — I10 ESSENTIAL HYPERTENSION: Primary | ICD-10-CM

## 2019-02-12 ENCOUNTER — TELEPHONE (OUTPATIENT)
Dept: INTERNAL MEDICINE | Facility: CLINIC | Age: 84
End: 2019-02-12

## 2019-02-12 RX ORDER — AMLODIPINE BESYLATE 5 MG/1
TABLET ORAL
Qty: 180 TABLET | Refills: 0 | Status: SHIPPED | OUTPATIENT
Start: 2019-02-12 | End: 2019-06-04 | Stop reason: SDUPTHER

## 2019-02-12 NOTE — TELEPHONE ENCOUNTER
Spoke with pharmacist and phoned ordered the rx for amlodipine. Contacted pt and informed that rx is being prepared to contact pharmacy prior to getting.

## 2019-02-12 NOTE — TELEPHONE ENCOUNTER
----- Message from Stephanie Jefferson sent at 2/12/2019  9:48 AM CST -----  Contact: [phone]  RX request - refill or new RX.  Is this a refill or new RX: refill    RX name and strength: amLODIPine (NORVASC) 5 MG tablet    Local pharmacy or mail order pharmacy:  [facility] [address] [place], [place] [address] AT [facility] [address]   [phone] (Phone)  [phone] (Fax)        Comments:  Please advise, thanks

## 2019-02-26 ENCOUNTER — TELEPHONE (OUTPATIENT)
Dept: INTERNAL MEDICINE | Facility: CLINIC | Age: 84
End: 2019-02-26

## 2019-02-26 NOTE — TELEPHONE ENCOUNTER
----- Message from Xena Cobb sent at 2/26/2019 10:45 AM CST -----  Contact: [phone]  Patient  want to know is it ok for her to take a garlic pill everyday . Please call and advise

## 2019-04-24 ENCOUNTER — TELEPHONE (OUTPATIENT)
Dept: INTERNAL MEDICINE | Facility: CLINIC | Age: 84
End: 2019-04-24

## 2019-04-24 NOTE — TELEPHONE ENCOUNTER
Sent portal msg,  does not make visits for bathing pts. Pt's daughter will need to find out if the company they use offer that service and if it will be a charge to the family.

## 2019-04-24 NOTE — TELEPHONE ENCOUNTER
Pt's daughter is requesting HH, please see daughter's msg below.    daughter calling would like order for home health for several days a week to bath patient or also other reason for home health options that PCP feels is needed for patient. Celeste Ash [phone]

## 2019-04-24 NOTE — TELEPHONE ENCOUNTER
----- Message from René Morales sent at 4/24/2019 10:44 AM CDT -----  Contact: Celeste Ash [phone]  Type: Orders Request    What orders/ testing are being requested? H/H order to bath patient several days per week    Is there a future appointment scheduled for the patient with PCP? Yes     When? 06/04/19    Comments: daughter calling would like order for home health for several days a week to bath patient or also other reason for home health options that PCP feels is needed for patient. Celeste Ash [phone]    Please call an advise  Thank you

## 2019-05-21 ENCOUNTER — PATIENT OUTREACH (OUTPATIENT)
Dept: ADMINISTRATIVE | Facility: HOSPITAL | Age: 84
End: 2019-05-21

## 2019-05-21 NOTE — PROGRESS NOTES
Health Maintenance Due   Topic Date Due    TETANUS VACCINE  04/02/1946    Pneumococcal Vaccine (65+ Low/Medium Risk) (1 of 2 - PCV13) 04/02/1993     Pre-visit chart audit complete.

## 2019-05-29 ENCOUNTER — LAB VISIT (OUTPATIENT)
Dept: LAB | Facility: HOSPITAL | Age: 84
End: 2019-05-29
Attending: INTERNAL MEDICINE
Payer: MEDICARE

## 2019-05-29 DIAGNOSIS — I10 ESSENTIAL HYPERTENSION: ICD-10-CM

## 2019-05-29 DIAGNOSIS — I48.91 NEW ONSET A-FIB: ICD-10-CM

## 2019-05-29 DIAGNOSIS — Z00.00 ANNUAL PHYSICAL EXAM: ICD-10-CM

## 2019-05-29 DIAGNOSIS — R60.0 LOWER EXTREMITY EDEMA: ICD-10-CM

## 2019-05-29 LAB
ALBUMIN SERPL BCP-MCNC: 3.5 G/DL (ref 3.5–5.2)
ALP SERPL-CCNC: 108 U/L (ref 55–135)
ALT SERPL W/O P-5'-P-CCNC: 10 U/L (ref 10–44)
ANION GAP SERPL CALC-SCNC: 10 MMOL/L (ref 8–16)
AST SERPL-CCNC: 18 U/L (ref 10–40)
BASOPHILS # BLD AUTO: 0.07 K/UL (ref 0–0.2)
BASOPHILS NFR BLD: 1.3 % (ref 0–1.9)
BILIRUB SERPL-MCNC: 0.3 MG/DL (ref 0.1–1)
BUN SERPL-MCNC: 8 MG/DL (ref 10–30)
CALCIUM SERPL-MCNC: 9.2 MG/DL (ref 8.7–10.5)
CHLORIDE SERPL-SCNC: 110 MMOL/L (ref 95–110)
CHOLEST SERPL-MCNC: 202 MG/DL (ref 120–199)
CHOLEST/HDLC SERPL: 4.6 {RATIO} (ref 2–5)
CO2 SERPL-SCNC: 21 MMOL/L (ref 23–29)
CREAT SERPL-MCNC: 0.8 MG/DL (ref 0.5–1.4)
DIFFERENTIAL METHOD: ABNORMAL
EOSINOPHIL # BLD AUTO: 0.1 K/UL (ref 0–0.5)
EOSINOPHIL NFR BLD: 2.4 % (ref 0–8)
ERYTHROCYTE [DISTWIDTH] IN BLOOD BY AUTOMATED COUNT: 14.5 % (ref 11.5–14.5)
EST. GFR  (AFRICAN AMERICAN): >60 ML/MIN/1.73 M^2
EST. GFR  (NON AFRICAN AMERICAN): >60 ML/MIN/1.73 M^2
GLUCOSE SERPL-MCNC: 101 MG/DL (ref 70–110)
HCT VFR BLD AUTO: 42.6 % (ref 37–48.5)
HDLC SERPL-MCNC: 44 MG/DL (ref 40–75)
HDLC SERPL: 21.8 % (ref 20–50)
HGB BLD-MCNC: 13.4 G/DL (ref 12–16)
IMM GRANULOCYTES # BLD AUTO: 0.01 K/UL (ref 0–0.04)
IMM GRANULOCYTES NFR BLD AUTO: 0.2 % (ref 0–0.5)
LDLC SERPL CALC-MCNC: 133 MG/DL (ref 63–159)
LYMPHOCYTES # BLD AUTO: 1.7 K/UL (ref 1–4.8)
LYMPHOCYTES NFR BLD: 31 % (ref 18–48)
MCH RBC QN AUTO: 28.8 PG (ref 27–31)
MCHC RBC AUTO-ENTMCNC: 31.5 G/DL (ref 32–36)
MCV RBC AUTO: 92 FL (ref 82–98)
MONOCYTES # BLD AUTO: 0.7 K/UL (ref 0.3–1)
MONOCYTES NFR BLD: 12.1 % (ref 4–15)
NEUTROPHILS # BLD AUTO: 2.9 K/UL (ref 1.8–7.7)
NEUTROPHILS NFR BLD: 53 % (ref 38–73)
NONHDLC SERPL-MCNC: 158 MG/DL
NRBC BLD-RTO: 0 /100 WBC
PLATELET # BLD AUTO: 326 K/UL (ref 150–350)
PMV BLD AUTO: 11.1 FL (ref 9.2–12.9)
POTASSIUM SERPL-SCNC: 4.3 MMOL/L (ref 3.5–5.1)
PROT SERPL-MCNC: 7.2 G/DL (ref 6–8.4)
RBC # BLD AUTO: 4.65 M/UL (ref 4–5.4)
SODIUM SERPL-SCNC: 141 MMOL/L (ref 136–145)
TRIGL SERPL-MCNC: 125 MG/DL (ref 30–150)
WBC # BLD AUTO: 5.45 K/UL (ref 3.9–12.7)

## 2019-05-29 PROCEDURE — 80061 LIPID PANEL: CPT | Mod: HCNC

## 2019-05-29 PROCEDURE — 85025 COMPLETE CBC W/AUTO DIFF WBC: CPT | Mod: HCNC

## 2019-05-29 PROCEDURE — 36415 COLL VENOUS BLD VENIPUNCTURE: CPT | Mod: HCNC,PN

## 2019-05-29 PROCEDURE — 80053 COMPREHEN METABOLIC PANEL: CPT | Mod: HCNC

## 2019-06-04 ENCOUNTER — OFFICE VISIT (OUTPATIENT)
Dept: INTERNAL MEDICINE | Facility: CLINIC | Age: 84
End: 2019-06-04
Payer: MEDICARE

## 2019-06-04 VITALS
BODY MASS INDEX: 27.54 KG/M2 | HEIGHT: 68 IN | WEIGHT: 181.69 LBS | TEMPERATURE: 98 F | HEART RATE: 62 BPM | OXYGEN SATURATION: 97 % | DIASTOLIC BLOOD PRESSURE: 64 MMHG | SYSTOLIC BLOOD PRESSURE: 118 MMHG

## 2019-06-04 DIAGNOSIS — I10 ESSENTIAL HYPERTENSION: ICD-10-CM

## 2019-06-04 DIAGNOSIS — M16.12 PRIMARY OSTEOARTHRITIS OF LEFT HIP: ICD-10-CM

## 2019-06-04 DIAGNOSIS — E78.5 HYPERLIPIDEMIA, UNSPECIFIED HYPERLIPIDEMIA TYPE: ICD-10-CM

## 2019-06-04 DIAGNOSIS — I70.0 ATHEROSCLEROSIS OF ABDOMINAL AORTA: ICD-10-CM

## 2019-06-04 DIAGNOSIS — Z13.6 SCREENING FOR AAA (ABDOMINAL AORTIC ANEURYSM): ICD-10-CM

## 2019-06-04 DIAGNOSIS — Z78.0 POST-MENOPAUSAL: ICD-10-CM

## 2019-06-04 DIAGNOSIS — Z13.820 SCREENING FOR OSTEOPOROSIS: ICD-10-CM

## 2019-06-04 DIAGNOSIS — Z00.00 ANNUAL PHYSICAL EXAM: Primary | ICD-10-CM

## 2019-06-04 PROCEDURE — 99999 PR PBB SHADOW E&M-EST. PATIENT-LVL IV: CPT | Mod: PBBFAC,HCNC,, | Performed by: INTERNAL MEDICINE

## 2019-06-04 PROCEDURE — 99397 PR PREVENTIVE VISIT,EST,65 & OVER: ICD-10-PCS | Mod: HCNC,S$GLB,, | Performed by: INTERNAL MEDICINE

## 2019-06-04 PROCEDURE — 93010 ELECTROCARDIOGRAM REPORT: CPT | Mod: HCNC,S$GLB,, | Performed by: INTERNAL MEDICINE

## 2019-06-04 PROCEDURE — 99999 PR PBB SHADOW E&M-EST. PATIENT-LVL IV: ICD-10-PCS | Mod: PBBFAC,HCNC,, | Performed by: INTERNAL MEDICINE

## 2019-06-04 PROCEDURE — 93005 ELECTROCARDIOGRAM TRACING: CPT | Mod: HCNC,S$GLB,, | Performed by: INTERNAL MEDICINE

## 2019-06-04 PROCEDURE — 93005 EKG 12-LEAD: ICD-10-PCS | Mod: HCNC,S$GLB,, | Performed by: INTERNAL MEDICINE

## 2019-06-04 PROCEDURE — 99397 PER PM REEVAL EST PAT 65+ YR: CPT | Mod: HCNC,S$GLB,, | Performed by: INTERNAL MEDICINE

## 2019-06-04 PROCEDURE — 93010 EKG 12-LEAD: ICD-10-PCS | Mod: HCNC,S$GLB,, | Performed by: INTERNAL MEDICINE

## 2019-06-04 RX ORDER — ATORVASTATIN CALCIUM 10 MG/1
10 TABLET, FILM COATED ORAL DAILY
Qty: 90 TABLET | Refills: 3 | Status: SHIPPED | OUTPATIENT
Start: 2019-06-04 | End: 2020-02-04 | Stop reason: SDUPTHER

## 2019-06-04 NOTE — PROGRESS NOTES
CHIEF COMPLAINT:  Annual exam.    HISTORY OF PRESENT ILLNESS:  The patient is a 89-year-old female who we see for   hypertension as well as DJD of the left hip and hyperlipidemia who comes in   today for annual physical exam.  The patient would like Home Health to come out   because of her blood pressure.  She states she checks her blood pressure two   times a week, but the machine does not act right.  She is getting varying   readings at home.  She does live at home with her daughter.  She does use a   wheelchair in the house to ambulate.  She does this because of her left hip.    She states the left leg is shorter than the right because of her hip.  The   patient cannot put pressure on the left hip.    REVIEW OF SYSTEMS:  The patient reports a three-pound weight gain.  No visual   change, but does state that sometimes she will see some black spots involving   the left eye, which sound like floaters.  No auditory change, no dysphagia, no   chest pain, no shortness of breath.  The patient is not exercising.  No PND, no   orthopnea.  No nausea, vomiting, abdominal pain.  She has a bowel movement   daily.  No bladder changes.  No weakness in the arms or legs.  No skin changes.    No numbness, tingling in the arms or legs.  Her last mammogram was in August 2018.    PHYSICAL EXAMINATION:  GENERAL APPEARANCE:  No acute distress.  HEENT:  Conjunctivae are clear.  She does have bilateral lens replacements.  TMs   are clear.  Nasal septum is midline without discharge.  Oropharynx shows an   upper denture.  The patient has no lower teeth.  NECK:  Trachea is midline without JVD.  PULMONARY:  Good inspiratory, expiratory breath sounds are heard.  Lungs are   clear to auscultation.  CARDIOVASCULAR:  S1, S2.  2+ carotid, no bruits.  EXTREMITIES:  Without edema.  ABDOMEN:  Nontender, nondistended, without hepatosplenomegaly.    The patient's x-ray of her left hip was reviewed.  It showed severe DJD.  The   patient did have a  bone mineral density in 2014.  The patient did have blood   tests consisting of a CBC, CMP, lipid panel done on May 29th.  These were   reviewed.  Her total cholesterol was 202, LDL was 133.    ASSESSMENT:  1.  Annual exam.  2.  Hypertension, currently stable.  3.  Atherosclerosis of the abdominal aorta.  4.  Hyperlipidemia.  5.  DJD of the left hip.    PLAN:  We will put the patient in for a left hip film, also put in for an   ultrasound of the abdominal aorta.  We will do an in-house EKG as well as a bone   density test.  The patient will follow up with us pending results.      NELSON/PENNY  dd: 06/04/2019 15:14:55 (CDT)  td: 06/05/2019 05:48:30 (CDT)  Doc ID   #[id number]  Job ID #[id number]    CC:

## 2019-06-06 ENCOUNTER — HOSPITAL ENCOUNTER (OUTPATIENT)
Dept: RADIOLOGY | Facility: OTHER | Age: 84
Discharge: HOME | End: 2019-06-06
Attending: INTERNAL MEDICINE
Payer: MEDICARE

## 2019-06-06 DIAGNOSIS — Z13.6 SCREENING FOR AAA (ABDOMINAL AORTIC ANEURYSM): ICD-10-CM

## 2019-06-06 DIAGNOSIS — I70.0 ATHEROSCLEROSIS OF ABDOMINAL AORTA: ICD-10-CM

## 2019-06-06 DIAGNOSIS — Z78.0 POST-MENOPAUSAL: ICD-10-CM

## 2019-06-06 DIAGNOSIS — M16.12 PRIMARY OSTEOARTHRITIS OF LEFT HIP: ICD-10-CM

## 2019-06-06 DIAGNOSIS — Z13.820 SCREENING FOR OSTEOPOROSIS: ICD-10-CM

## 2019-06-06 PROCEDURE — 76775 US EXAM ABDO BACK WALL LIM: CPT | Mod: 26,HCNC,, | Performed by: RADIOLOGY

## 2019-06-06 PROCEDURE — 76775 US ABDOMINAL AORTA: ICD-10-PCS | Mod: 26,HCNC,, | Performed by: RADIOLOGY

## 2019-06-06 PROCEDURE — 77080 DEXA BONE DENSITY SPINE HIP: ICD-10-PCS | Mod: 26,HCNC,, | Performed by: RADIOLOGY

## 2019-06-06 PROCEDURE — 73502 X-RAY EXAM HIP UNI 2-3 VIEWS: CPT | Mod: TC,HCNC,FY,LT

## 2019-06-06 PROCEDURE — 77080 DXA BONE DENSITY AXIAL: CPT | Mod: 26,HCNC,, | Performed by: RADIOLOGY

## 2019-06-06 PROCEDURE — 77080 DXA BONE DENSITY AXIAL: CPT | Mod: TC,HCNC

## 2019-06-06 PROCEDURE — 76775 US EXAM ABDO BACK WALL LIM: CPT | Mod: TC,HCNC

## 2019-06-06 PROCEDURE — 73502 XR HIP 2 VIEW LEFT: ICD-10-PCS | Mod: 26,HCNC,LT, | Performed by: RADIOLOGY

## 2019-06-06 PROCEDURE — 73502 X-RAY EXAM HIP UNI 2-3 VIEWS: CPT | Mod: 26,HCNC,LT, | Performed by: RADIOLOGY

## 2019-06-10 ENCOUNTER — TELEPHONE (OUTPATIENT)
Dept: INTERNAL MEDICINE | Facility: CLINIC | Age: 84
End: 2019-06-10

## 2019-06-10 NOTE — TELEPHONE ENCOUNTER
Pt informed. She does not want to see Orthop at this time. She states that she is not in any pain and is very careful when she movers alternating w/c with walker.

## 2019-06-10 NOTE — TELEPHONE ENCOUNTER
----- Message from Vazquez Marsh MD sent at 6/7/2019  7:01 PM CDT -----  Please call the patient regarding her abnormal result. Please notify that her ultrasound showed no aneurysm. Her bone density test showed some thinning of the bones called osteopenia. Her hip x-ray showed that the joint is severely degenerated. Please offer her an appointment with Orthopedics.

## 2019-06-18 ENCOUNTER — TELEPHONE (OUTPATIENT)
Dept: INTERNAL MEDICINE | Facility: CLINIC | Age: 84
End: 2019-06-18

## 2019-06-18 NOTE — TELEPHONE ENCOUNTER
----- Message from Rose Cuevas sent at 6/18/2019 10:23 AM CDT -----  Contact: self/[phone]  Patient called in regards needing to find out if she will need a mammogram this year. Please call and advise. Thank you!!!

## 2019-06-19 DIAGNOSIS — Z12.31 VISIT FOR SCREENING MAMMOGRAM: Primary | ICD-10-CM

## 2019-08-26 ENCOUNTER — HOSPITAL ENCOUNTER (OUTPATIENT)
Dept: RADIOLOGY | Facility: HOSPITAL | Age: 84
Discharge: HOME | End: 2019-08-26
Attending: INTERNAL MEDICINE
Payer: MEDICARE

## 2019-08-26 DIAGNOSIS — Z12.31 VISIT FOR SCREENING MAMMOGRAM: ICD-10-CM

## 2019-08-26 PROCEDURE — 77067 MAMMO DIGITAL SCREENING BILAT WITH CAD: ICD-10-PCS | Mod: 26,HCNC,, | Performed by: RADIOLOGY

## 2019-08-26 PROCEDURE — 77067 SCR MAMMO BI INCL CAD: CPT | Mod: 26,HCNC,, | Performed by: RADIOLOGY

## 2019-08-26 PROCEDURE — 77067 SCR MAMMO BI INCL CAD: CPT | Mod: TC,HCNC,PN

## 2019-09-05 ENCOUNTER — TELEPHONE (OUTPATIENT)
Dept: INTERNAL MEDICINE | Facility: CLINIC | Age: 84
End: 2019-09-05

## 2019-09-05 NOTE — TELEPHONE ENCOUNTER
Pt's daughter is requesting a HH aide for her mother, bathing, grooming, making sure she's taking her medications.     Please advise,  Thank You.

## 2019-09-05 NOTE — TELEPHONE ENCOUNTER
----- Message from Trudy Cintron sent at 9/5/2019  8:25 AM CDT -----  Contact: Daughter, Nilsa [phone]  She's requesting home health for her mother, bathing, grooming, making sure she's taking her medications.

## 2019-09-06 ENCOUNTER — TELEPHONE (OUTPATIENT)
Dept: INTERNAL MEDICINE | Facility: CLINIC | Age: 84
End: 2019-09-06

## 2019-09-06 NOTE — TELEPHONE ENCOUNTER
Spoke with pt's daughter, sent a msg on yesterday requesting an order for HH Aide to assist pt with ADL's (bathing, grooming etc) try using [facility]. Pt not currently using a Home Health company. Msg was sent on 09/05/19.

## 2019-09-06 NOTE — TELEPHONE ENCOUNTER
----- Message from Erica Mckenzie sent at 9/6/2019  1:33 PM CDT -----  Contact: pt daughter Nilsa [phone]  Pt daughter called to get orders for home health assistance.  Please advise

## 2019-11-20 ENCOUNTER — PATIENT OUTREACH (OUTPATIENT)
Dept: ADMINISTRATIVE | Facility: HOSPITAL | Age: 84
End: 2019-11-20

## 2020-02-04 DIAGNOSIS — E78.5 HYPERLIPIDEMIA, UNSPECIFIED HYPERLIPIDEMIA TYPE: ICD-10-CM

## 2020-02-04 NOTE — TELEPHONE ENCOUNTER
----- Message from Erika Paris sent at 2/4/2020  2:37 PM CST -----  Contact: Patient [phone]  Prescription Request:     Name of medication:   amLODIPine (NORVASC) 5 MG tablet  atorvastatin (LIPITOR) 10 MG tablet    Reason for request: Refill    Pharmacy: [facility] #[id number] [facility] [address] [facility] [address] AT [facility] & PR[address]    Please advise.    Thank You

## 2020-02-05 ENCOUNTER — TELEPHONE (OUTPATIENT)
Dept: INTERNAL MEDICINE | Facility: CLINIC | Age: 85
End: 2020-02-05

## 2020-02-05 RX ORDER — AMLODIPINE BESYLATE 5 MG/1
TABLET ORAL
Qty: 180 TABLET | Refills: 1 | Status: SHIPPED | OUTPATIENT
Start: 2020-02-05 | End: 2020-02-05 | Stop reason: SDUPTHER

## 2020-02-05 RX ORDER — ATORVASTATIN CALCIUM 10 MG/1
10 TABLET, FILM COATED ORAL DAILY
Qty: 90 TABLET | Refills: 1 | Status: SHIPPED | OUTPATIENT
Start: 2020-02-05 | End: 2020-12-03 | Stop reason: SDUPTHER

## 2020-02-05 RX ORDER — AMLODIPINE BESYLATE 5 MG/1
TABLET ORAL
Qty: 180 TABLET | Refills: 3 | OUTPATIENT
Start: 2020-02-05

## 2020-02-05 RX ORDER — AMLODIPINE BESYLATE 5 MG/1
TABLET ORAL
Qty: 180 TABLET | Refills: 1 | Status: SHIPPED | OUTPATIENT
Start: 2020-02-05 | End: 2020-08-05

## 2020-02-05 NOTE — TELEPHONE ENCOUNTER
----- Message from Herson Barr sent at 2/5/2020 11:04 AM CST -----  Contact: Ailyn Gaona   Patient is calling for an RX refill or new RX.  Is this a refill or new RX:  refill  RX name and strength: amLODIPine (NORVASC) 5 MG tablet  Directions (copy/paste from chart):    Is this a 30 day or 90 day RX:    Local pharmacy or mail order pharmacy:  [facility] [id number] [address] [facility] & PR[address] [phone] (Phone)  [phone] (Fax)  Pharmacy name and phone # (copy/paste from chart):     Comments:  Patient is completely out

## 2020-02-05 NOTE — PROGRESS NOTES
Refill Authorization Note     is requesting a refill authorization.    Brief assessment and rationale for refill: APPROVE: prr                                         Comments:     Requested Prescriptions   Signed Prescriptions Disp Refills    amLODIPine (NORVASC) 5 MG tablet 180 tablet 1     Sig: TAKE 1 TABLET(5 MG) BY MOUTH TWICE DAILY       Cardiovascular:  Calcium Channel Blockers Passed - 2/5/2020 11:21 AM        Passed - Patient is at least 18 years old        Passed - Last BP in normal range within 360 days.     BP Readings from Last 3 Encounters:   06/04/19 118/64   02/04/19 124/70   05/02/18 132/70              Passed - Office visit in past 12 months or future 90 days.     Recent Outpatient Visits            8 months ago Annual physical exam    [facility] Vazquez Marsh MD    1 year ago Essential hypertension    [facility] Vazquez Marsh MD    1 year ago Essential hypertension    [facility] Vazquez Marsh MD    2 years ago Paroxysmal atrial fibrillation    [facility] Fallon Patel NP    2 years ago Rash of body    [facility] Vazquez Marsh MD

## 2020-02-05 NOTE — PROGRESS NOTES
Refill Authorization Note     is requesting a refill authorization.    Brief assessment and rationale for refill: APPROVE: prr          Medication Therapy Plan: Previously ordered as phone-in; reordered as normal                              Comments:     Requested Prescriptions   Signed Prescriptions Disp Refills    amLODIPine (NORVASC) 5 MG tablet 180 tablet 1     Sig: TAKE 1 TABLET(5 MG) BY MOUTH TWICE DAILY.       Cardiovascular:  Calcium Channel Blockers Passed - 2/3/2020  4:33 PM        Passed - Patient is at least 18 years old        Passed - Last BP in normal range within 360 days.     BP Readings from Last 3 Encounters:   06/04/19 118/64   02/04/19 124/70   05/02/18 132/70              Passed - Office visit in past 12 months or future 90 days.     Recent Outpatient Visits            8 months ago Annual physical exam    [facility] Vazquez Marsh MD    1 year ago Essential hypertension    [facility] Vazquez Marsh MD    1 year ago Essential hypertension    [facility] MD    2 years ago Paroxysmal atrial fibrillation    [facility] Fallon Patel NP    2 years ago Rash of body    [facility] Vazquez Marsh MD

## 2020-02-05 NOTE — PROGRESS NOTES
Refill Authorization Note     is requesting a refill authorization.    Brief assessment and rationale for refill: QUICK DC: duplicate request (amlodipine) // APPROVE: prr                                         Comments:     Requested Prescriptions   Signed Prescriptions Disp Refills    atorvastatin (LIPITOR) 10 MG tablet 90 tablet 1     Sig: Take 1 tablet (10 mg total) by mouth once daily.       Cardiovascular:  Antilipid - Statins Passed - 2/4/2020  2:41 PM        Passed - Patient is at least 18 years old        Passed - Office visit in past 12 months or future 90 days.     Recent Outpatient Visits            8 months ago Annual physical exam    [facility] Vazquez Marsh MD    1 year ago Essential hypertension    [facility] Vazquez Marsh MD    1 year ago Essential hypertension    [facility] Vazquez Marsh MD    2 years ago Paroxysmal atrial fibrillation    [facility] Fallon Patel NP    2 years ago Rash of body    [facility] Vazquez Marsh MD                    Passed - Lipid Panel completed in last 360 days     Lab Results   Component Value Date    CHOL 202 (H) 05/29/2019    HDL 44 05/29/2019    LDLCALC 133.0 05/29/2019    TRIG 125 05/29/2019             Passed - ALT is 94 or below and within 360 days     ALT   Date Value Ref Range Status   05/29/2019 10 10 - 44 U/L Final   05/02/2018 11 10 - 44 U/L Final   09/22/2017 11 10 - 44 U/L Final              Passed - AST is 54 or below and within 360 days     AST   Date Value Ref Range Status   05/29/2019 18 10 - 40 U/L Final   05/02/2018 15 10 - 40 U/L Final   09/22/2017 15 10 - 40 U/L Final            Refused Prescriptions Disp Refills    amLODIPine (NORVASC) 5 MG tablet 180 tablet 3     Sig: TAKE 1 TABLET(5 MG) BY MOUTH TWICE DAILY       Cardiovascular:  Calcium Channel Blockers Passed - 2/4/2020  2:41 PM        Passed - Patient is at least 18 years old        Passed  - Last BP in normal range within 360 days.     BP Readings from Last 3 Encounters:   06/04/19 118/64   02/04/19 124/70   05/02/18 132/70              Passed - Office visit in past 12 months or future 90 days.     Recent Outpatient Visits            8 months ago Annual physical exam    [facility] Vazquez Marsh MD    1 year ago Essential hypertension    [facility] Vazquez Marsh MD    1 year ago Essential hypertension    [facility] Vazquez Marsh MD    2 years ago Paroxysmal atrial fibrillation    [facility] Fallon Patel NP    2 years ago Rash of body    [facility] Vazquez Marsh MD

## 2020-02-05 NOTE — TELEPHONE ENCOUNTER
----- Message from Lorrie Toledo sent at 2/5/2020 12:21 PM CST -----  Contact: Self [phone]  Patient is calling for an RX refill or new RX.  Is this a refill or new RX:  refill  RX name and strength:  amLODIPine (NORVASC) 5 MG tablet   Directions (copy/paste from chart):    Is this a 30 day or 90 day RX:  90 day   Local pharmacy or mail order pharmacy:  local  Pharmacy name and phone # (copy/paste from chart):   [facility] #[id number] [facility] [address] [facility] [address] [facility] & [address] [phone] (Phone)  [phone] (Fax)  Comments:

## 2020-05-14 ENCOUNTER — TELEPHONE (OUTPATIENT)
Dept: INTERNAL MEDICINE | Facility: CLINIC | Age: 85
End: 2020-05-14

## 2020-05-14 NOTE — TELEPHONE ENCOUNTER
Pt has a refill and need to contact her pharmacy to fill. Pt has not received any messages stating to discontinue this medication.

## 2020-05-14 NOTE — TELEPHONE ENCOUNTER
----- Message from Katia Chris sent at 5/14/2020 12:18 PM CDT -----  Contact: Self   Requesting an RX refill or new RX.  Is this a refill or new RX:    RX name and strength: atorvastatin (LIPITOR) 10 MG tablet    Directions (copy/paste from chart):  Take 1 tablet (10 mg total) by mouth once daily. - Oral  Is this a 30 day or 90 day RX:    Local pharmacy or mail order pharmacy:    Pharmacy name and phone # (copy/paste from chart):   [facility] #[id number] - [place], [place] - [address] [facility] & [address] [phone] (Phone)  [phone] (Fax)      Comments:

## 2020-05-14 NOTE — TELEPHONE ENCOUNTER
----- Message from Jessica Qureshi sent at 5/14/2020 12:22 PM CDT -----  Contact: self/[phone]  Pt called and wanted to know if the dr still wants her to take     atorvastatin (LIPITOR) 10 MG tablet 90 tablet 1 2/5/2020 2/4/2021 No  Take 1 tablet (10 mg total) by mouth once daily.     She would like a call back.     Please advise

## 2020-06-03 ENCOUNTER — PATIENT OUTREACH (OUTPATIENT)
Dept: ADMINISTRATIVE | Facility: HOSPITAL | Age: 85
End: 2020-06-03

## 2020-08-04 NOTE — TELEPHONE ENCOUNTER
Care Due:                  Date            Visit Type   Department     Provider  --------------------------------------------------------------------------------                                PHYSICAL -                              ESTABLISHED   [facility] INTERNAL  Last Visit: 06-      PATIENT      MEDICINE       ARAMIS WORLEY  Next Visit: None Scheduled  None         None Found                                                            Last  Test          Frequency    Reason                     Performed    Due Date  --------------------------------------------------------------------------------    Office Visit  12 months..  atorvastatin.............  06- 05-    ALT.........  12 months..  atorvastatin.............  05- 05-    AST.........  12 months..  atorvastatin.............  05- 05-    HDL.........  12 months..  atorvastatin.............  05- 05-    LDL.........  12 months..  atorvastatin.............  05- 05-    Total         12 months..  atorvastatin.............  05- 05-  Cholesterol.    Triglyceride  12 months..  atorvastatin.............  05- 05-  s...........    Powered by [employer]. Reference number: [id number]. 8/04/2020 2:10:17 PM CDT

## 2020-08-04 NOTE — TELEPHONE ENCOUNTER
----- Message from Lawanda Weber sent at 8/4/2020  2:14 PM CDT -----  Type:  Needs Medical Advice - Refill         Pharm[facility] and phone #[facility]  [id number] [address] [facility] & PR[address] [phone] (Phone)  [phone] (Fax)        Additional Information: This medication need a refill amLODIPine (NORVASC) 5 MG tablet

## 2020-08-05 RX ORDER — AMLODIPINE BESYLATE 5 MG/1
TABLET ORAL
Qty: 180 TABLET | Refills: 1 | Status: SHIPPED | OUTPATIENT
Start: 2020-08-05 | End: 2021-01-20 | Stop reason: SDUPTHER

## 2020-08-06 ENCOUNTER — TELEPHONE (OUTPATIENT)
Dept: INTERNAL MEDICINE | Facility: CLINIC | Age: 85
End: 2020-08-06

## 2020-08-06 NOTE — TELEPHONE ENCOUNTER
----- Message from Tasha Infante sent at 8/5/2020  4:51 PM CDT -----  Contact: self [phone]  Requesting an RX refill or new RX.  Is this a refill or new RX:  new  RX name and strength: amLODIPine (NORVASC) 5 MG tablet  Directions (copy/paste from chart):  TAKE 1 TABLET(5 MG) BY MOUTH TWICE DAILY  Is this a 30 day or 90 day RX:  30  Local pharmacy or mail order pharmacy:  local  Pharmacy name and phone # (copy/paste from chart):   [facility] #[id number] [address] "[facility] [address] AT [facility] & PR[address] [phone] (Phone)  [phone] (Fax)  Comments:

## 2020-09-29 ENCOUNTER — PATIENT MESSAGE (OUTPATIENT)
Dept: OTHER | Facility: OTHER | Age: 85
End: 2020-09-29

## 2020-10-05 ENCOUNTER — PATIENT MESSAGE (OUTPATIENT)
Dept: ADMINISTRATIVE | Facility: HOSPITAL | Age: 85
End: 2020-10-05

## 2020-12-02 DIAGNOSIS — E78.5 HYPERLIPIDEMIA, UNSPECIFIED HYPERLIPIDEMIA TYPE: ICD-10-CM

## 2020-12-02 RX ORDER — ATORVASTATIN CALCIUM 10 MG/1
10 TABLET, FILM COATED ORAL DAILY
Qty: 90 TABLET | Refills: 1 | Status: CANCELLED | OUTPATIENT
Start: 2020-12-02 | End: 2021-12-02

## 2020-12-02 NOTE — TELEPHONE ENCOUNTER
No new care gaps identified.  Powered by [employer]. Reference number: [id number]. 12/02/2020 1:09:07 PM   CST

## 2020-12-02 NOTE — TELEPHONE ENCOUNTER
----- Message from Lawanda Weber sent at 12/2/2020 12:18 PM CST -----  Requesting an RX refill or new RX.  Is this a refill or new RX: refill   RX name and strength:atorvastatin (LIPITOR) 10 MG tablet  Is this a 30 day or 90 day RX: 30 day   Pharmacy name and phone # (copy/paste from chart):    [id number] - [place], [place] - [address] [facility] & [address]  [address] [facility] [address]  Phone: [phone] Fax: [phone]      Comments:

## 2020-12-03 ENCOUNTER — TELEPHONE (OUTPATIENT)
Dept: INTERNAL MEDICINE | Facility: CLINIC | Age: 85
End: 2020-12-03

## 2020-12-03 DIAGNOSIS — E78.5 HYPERLIPIDEMIA, UNSPECIFIED HYPERLIPIDEMIA TYPE: ICD-10-CM

## 2020-12-03 DIAGNOSIS — I48.91 NEW ONSET A-FIB: ICD-10-CM

## 2020-12-03 DIAGNOSIS — I10 ESSENTIAL HYPERTENSION: Primary | ICD-10-CM

## 2020-12-03 NOTE — TELEPHONE ENCOUNTER
----- Message from Siomara Steward sent at 12/3/2020 10:50 AM CST -----  Regarding: orders  Patient Requesting Order    Order Needed:  Blood work     Communication Preference: [phone]      Additional Information:   Pt is schedule for blood work on 1/19

## 2020-12-03 NOTE — TELEPHONE ENCOUNTER
----- Message from Kinjal Regalado sent at 12/3/2020 10:37 AM CST -----  Regarding: Pt self Home [phone]  Mobile [phone]  Requesting an RX refill or new RX.  Is this a refill or new RX: Refill  RX name and strength:atorvastatin (LIPITOR) 10 MG tablet  Is this a 30 day or 90 day RX: 90  [id number] [address] [facility] [address] AT [facility] & PRESS  Comment: Patient would like a call when her script is filled please.

## 2020-12-03 NOTE — TELEPHONE ENCOUNTER
No new care gaps identified.  Powered by [employer]. Reference number: [id number]. 12/03/2020 11:05:29 AM   CST

## 2020-12-04 RX ORDER — ATORVASTATIN CALCIUM 10 MG/1
10 TABLET, FILM COATED ORAL DAILY
Qty: 90 TABLET | Refills: 1 | Status: SHIPPED | OUTPATIENT
Start: 2020-12-04 | End: 2020-12-07 | Stop reason: SDUPTHER

## 2020-12-07 DIAGNOSIS — E78.5 HYPERLIPIDEMIA, UNSPECIFIED HYPERLIPIDEMIA TYPE: ICD-10-CM

## 2020-12-07 RX ORDER — ATORVASTATIN CALCIUM 10 MG/1
10 TABLET, FILM COATED ORAL DAILY
Qty: 90 TABLET | Refills: 1 | Status: SHIPPED | OUTPATIENT
Start: 2020-12-07 | End: 2021-11-12

## 2020-12-07 NOTE — TELEPHONE ENCOUNTER
No new care gaps identified.  Powered by [employer]. Reference number: [id number]. 12/07/2020 4:49:51 PM   CST

## 2020-12-11 ENCOUNTER — PATIENT MESSAGE (OUTPATIENT)
Dept: OTHER | Facility: OTHER | Age: 85
End: 2020-12-11

## 2021-01-04 ENCOUNTER — PATIENT MESSAGE (OUTPATIENT)
Dept: ADMINISTRATIVE | Facility: HOSPITAL | Age: 86
End: 2021-01-04

## 2021-01-20 RX ORDER — AMLODIPINE BESYLATE 5 MG/1
5 TABLET ORAL 2 TIMES DAILY
Qty: 180 TABLET | Refills: 3 | Status: SHIPPED | OUTPATIENT
Start: 2021-01-20 | End: 2022-01-11

## 2021-01-22 ENCOUNTER — PATIENT MESSAGE (OUTPATIENT)
Dept: ADMINISTRATIVE | Facility: OTHER | Age: 86
End: 2021-01-22

## 2021-02-01 ENCOUNTER — TELEPHONE (OUTPATIENT)
Dept: INTERNAL MEDICINE | Facility: CLINIC | Age: 86
End: 2021-02-01

## 2021-04-05 ENCOUNTER — PATIENT MESSAGE (OUTPATIENT)
Dept: ADMINISTRATIVE | Facility: HOSPITAL | Age: 86
End: 2021-04-05

## 2021-04-20 ENCOUNTER — TELEPHONE (OUTPATIENT)
Dept: INTERNAL MEDICINE | Facility: CLINIC | Age: 86
End: 2021-04-20

## 2021-04-22 ENCOUNTER — TELEPHONE (OUTPATIENT)
Dept: INTERNAL MEDICINE | Facility: CLINIC | Age: 86
End: 2021-04-22

## 2021-04-22 ENCOUNTER — OFFICE VISIT (OUTPATIENT)
Dept: INTERNAL MEDICINE | Facility: CLINIC | Age: 86
End: 2021-04-22
Payer: MEDICARE

## 2021-04-22 DIAGNOSIS — M16.12 PRIMARY OSTEOARTHRITIS OF LEFT HIP: ICD-10-CM

## 2021-04-22 DIAGNOSIS — E78.5 HYPERLIPIDEMIA, UNSPECIFIED HYPERLIPIDEMIA TYPE: ICD-10-CM

## 2021-04-22 DIAGNOSIS — I10 ESSENTIAL HYPERTENSION: Primary | ICD-10-CM

## 2021-04-22 PROCEDURE — 99442 PR PHYSICIAN TELEPHONE EVALUATION 11-20 MIN: ICD-10-PCS | Mod: 95,,, | Performed by: INTERNAL MEDICINE

## 2021-04-22 PROCEDURE — 99442 PR PHYSICIAN TELEPHONE EVALUATION 11-20 MIN: CPT | Mod: 95,,, | Performed by: INTERNAL MEDICINE

## 2021-04-24 DIAGNOSIS — E78.5 HYPERLIPIDEMIA, UNSPECIFIED HYPERLIPIDEMIA TYPE: ICD-10-CM

## 2021-04-24 DIAGNOSIS — I10 ESSENTIAL HYPERTENSION: Primary | ICD-10-CM

## 2021-04-24 DIAGNOSIS — I48.91 NEW ONSET A-FIB: ICD-10-CM

## 2021-04-27 ENCOUNTER — TELEPHONE (OUTPATIENT)
Dept: INTERNAL MEDICINE | Facility: CLINIC | Age: 86
End: 2021-04-27

## 2021-04-27 DIAGNOSIS — I50.9 CONGESTIVE HEART FAILURE, UNSPECIFIED HF CHRONICITY, UNSPECIFIED HEART FAILURE TYPE: Primary | ICD-10-CM

## 2021-05-18 DIAGNOSIS — E78.5 HYPERLIPIDEMIA, UNSPECIFIED HYPERLIPIDEMIA TYPE: ICD-10-CM

## 2021-05-24 RX ORDER — ATORVASTATIN CALCIUM 10 MG/1
TABLET, FILM COATED ORAL
Qty: 90 TABLET | Refills: 0 | OUTPATIENT
Start: 2021-05-24

## 2021-06-13 ENCOUNTER — TELEPHONE (OUTPATIENT)
Dept: HOME HEALTH SERVICES | Facility: CLINIC | Age: 86
End: 2021-06-13

## 2021-07-12 ENCOUNTER — TELEPHONE (OUTPATIENT)
Dept: PRIMARY CARE CLINIC | Facility: CLINIC | Age: 86
End: 2021-07-12

## 2021-07-27 ENCOUNTER — PES CALL (OUTPATIENT)
Dept: ADMINISTRATIVE | Facility: CLINIC | Age: 86
End: 2021-07-27

## 2021-11-11 DIAGNOSIS — E78.5 HYPERLIPIDEMIA, UNSPECIFIED HYPERLIPIDEMIA TYPE: ICD-10-CM

## 2021-11-12 RX ORDER — ATORVASTATIN CALCIUM 10 MG/1
TABLET, FILM COATED ORAL
Qty: 90 TABLET | Refills: 1 | Status: SHIPPED | OUTPATIENT
Start: 2021-11-12 | End: 2022-05-10

## 2021-12-10 ENCOUNTER — PES CALL (OUTPATIENT)
Dept: ADMINISTRATIVE | Facility: CLINIC | Age: 86
End: 2021-12-10
Payer: MEDICARE

## 2021-12-17 ENCOUNTER — TELEPHONE (OUTPATIENT)
Dept: INTERNAL MEDICINE | Facility: CLINIC | Age: 86
End: 2021-12-17
Payer: MEDICARE

## 2022-01-11 RX ORDER — AMLODIPINE BESYLATE 5 MG/1
TABLET ORAL
Qty: 180 TABLET | Refills: 3 | Status: SHIPPED | OUTPATIENT
Start: 2022-01-11 | End: 2022-07-28 | Stop reason: SDUPTHER

## 2022-01-11 NOTE — TELEPHONE ENCOUNTER
Care Due:                  Date            Visit Type   Department     Provider  --------------------------------------------------------------------------------                                             [facility] INTERNAL  Last Visit: 04-      None         MEDICINE       ARAMIS WORLEY  Next Visit: None Scheduled  None         None Found                                                            Last  Test          Frequency    Reason                     Performed    Due Date  --------------------------------------------------------------------------------    CMP.........  12 months..  atorvastatin.............  Not Found    Overdue    Lipid Panel.  12 months..  atorvastatin.............  Not Found    Overdue    Powered by [employer]. Reference number: [id number].   1/11/2022 11:43:12 AM CST

## 2022-05-10 DIAGNOSIS — E78.5 HYPERLIPIDEMIA, UNSPECIFIED HYPERLIPIDEMIA TYPE: ICD-10-CM

## 2022-05-10 RX ORDER — ATORVASTATIN CALCIUM 10 MG/1
TABLET, FILM COATED ORAL
Qty: 90 TABLET | Refills: 1 | Status: SHIPPED | OUTPATIENT
Start: 2022-05-10

## 2022-05-10 NOTE — TELEPHONE ENCOUNTER
Refill Routing Note   Medication(s) are not appropriate for processing by [facility] for the following reason(s):      - Required laboratory values are outdated  - Required vitals are outdated    [facility] action(s):  Defer Medication-related problems identified:   Requires labs  Requires appointment        Medication reconciliation completed: No     Appointments  past 12m or future 3m with PCP    Date Provider   Last Visit   4/22/2021 Vazquez Marsh MD   Next Visit   Visit date not found Vazquez Marsh MD   ED visits in past 90 days: 0        Note composed:12:58 PM 05/10/2022

## 2022-05-10 NOTE — TELEPHONE ENCOUNTER
Care Due:                  Date            Visit Type   Department     Provider  --------------------------------------------------------------------------------                                VIRTUAL      [facility] INTERNAL  Last Visit: 04-      AUDIO ONLY   MEDICINE       ARAMIS WORLEY  Next Visit: None Scheduled  None         None Found                                                            Last  Test          Frequency    Reason                     Performed    Due Date  --------------------------------------------------------------------------------    Office Visit  12 months..  atorvastatin.............  04- 04-    CMP.........  12 months..  atorvastatin.............  Not Found    Overdue    Lipid Panel.  12 months..  atorvastatin.............  Not Found    Overdue    [employer] Embedded Care Gaps. Reference number: [id number]. 5/10/2022   5:43:54 AM CDT

## 2022-05-27 ENCOUNTER — PATIENT OUTREACH (OUTPATIENT)
Dept: ADMINISTRATIVE | Facility: HOSPITAL | Age: 87
End: 2022-05-27
Payer: MEDICARE

## 2022-06-14 ENCOUNTER — TELEPHONE (OUTPATIENT)
Dept: INTERNAL MEDICINE | Facility: CLINIC | Age: 87
End: 2022-06-14
Payer: MEDICARE

## 2022-06-14 NOTE — TELEPHONE ENCOUNTER
----- Message from Lesly Cook sent at 6/14/2022  4:16 PM CDT -----  Contact: Nilsa [phone]  Pts daughter is calling she is trying to get home health care services and she is needing what all she needs to do to get this started please give return call

## 2022-06-15 NOTE — TELEPHONE ENCOUNTER
I have not seen this patient in over a year. I would have to see her first to determine if she needs home health.

## 2022-06-21 DIAGNOSIS — E78.5 HYPERLIPIDEMIA, UNSPECIFIED HYPERLIPIDEMIA TYPE: ICD-10-CM

## 2022-06-21 NOTE — TELEPHONE ENCOUNTER
No new care gaps identified.  [facility] Embedded Care Gaps. Reference number: [id number]. 6/21/2022   5:02:06 PM CDT

## 2022-06-21 NOTE — TELEPHONE ENCOUNTER
----- Message from Pool Knox sent at 6/21/2022  4:51 PM CDT -----  Contact: [phone]  Requesting an RX refill or new RX.  Is this a refill or new RX: refill  RX name and strength (copy/paste from chart):  atorvastatin (LIPITOR) 10 MG tablet  Is this a 30 day or 90 day RX: 90  Pharmacy name and phone # (copy/paste from chart):    [facility] #[id number] - [place], [place] - [address] [facility] [address] [facility] & [address]  [address] [facility] [employer] [facility] [address]  Phone: [phone] Fax: [phone]      The doctors have asked that we provide their patients with the following 2 reminders -- prescription refills can take up to 72 hours, and a friendly reminder that in the future you can use your [facility] account to request refills: call back

## 2022-06-22 RX ORDER — ATORVASTATIN CALCIUM 10 MG/1
10 TABLET, FILM COATED ORAL DAILY
Qty: 90 TABLET | Refills: 1 | OUTPATIENT
Start: 2022-06-22

## 2022-07-19 ENCOUNTER — TELEPHONE (OUTPATIENT)
Dept: INTERNAL MEDICINE | Facility: CLINIC | Age: 87
End: 2022-07-19
Payer: MEDICARE

## 2022-07-28 ENCOUNTER — OFFICE VISIT (OUTPATIENT)
Dept: INTERNAL MEDICINE | Facility: CLINIC | Age: 87
End: 2022-07-28
Payer: MEDICARE

## 2022-07-28 ENCOUNTER — LAB VISIT (OUTPATIENT)
Dept: LAB | Facility: HOSPITAL | Age: 87
End: 2022-07-28
Attending: INTERNAL MEDICINE
Payer: MEDICARE

## 2022-07-28 VITALS
BODY MASS INDEX: 28.9 KG/M2 | HEART RATE: 63 BPM | DIASTOLIC BLOOD PRESSURE: 68 MMHG | OXYGEN SATURATION: 96 % | SYSTOLIC BLOOD PRESSURE: 126 MMHG | WEIGHT: 190.69 LBS | HEIGHT: 68 IN

## 2022-07-28 DIAGNOSIS — I10 ESSENTIAL HYPERTENSION: Primary | ICD-10-CM

## 2022-07-28 DIAGNOSIS — E78.5 HYPERLIPIDEMIA, UNSPECIFIED HYPERLIPIDEMIA TYPE: ICD-10-CM

## 2022-07-28 DIAGNOSIS — I10 ESSENTIAL HYPERTENSION: ICD-10-CM

## 2022-07-28 LAB
ALBUMIN SERPL BCP-MCNC: 3.8 G/DL (ref 3.5–5.2)
ALP SERPL-CCNC: 107 U/L (ref 55–135)
ALT SERPL W/O P-5'-P-CCNC: 15 U/L (ref 10–44)
ANION GAP SERPL CALC-SCNC: 8 MMOL/L (ref 8–16)
AST SERPL-CCNC: 14 U/L (ref 10–40)
BASOPHILS # BLD AUTO: 0.03 K/UL (ref 0–0.2)
BASOPHILS NFR BLD: 0.5 % (ref 0–1.9)
BILIRUB SERPL-MCNC: 0.4 MG/DL (ref 0.1–1)
BUN SERPL-MCNC: 6 MG/DL (ref 10–30)
CALCIUM SERPL-MCNC: 9 MG/DL (ref 8.7–10.5)
CHLORIDE SERPL-SCNC: 109 MMOL/L (ref 95–110)
CHOLEST SERPL-MCNC: 131 MG/DL (ref 120–199)
CHOLEST/HDLC SERPL: 3 {RATIO} (ref 2–5)
CO2 SERPL-SCNC: 24 MMOL/L (ref 23–29)
CREAT SERPL-MCNC: 0.8 MG/DL (ref 0.5–1.4)
DIFFERENTIAL METHOD: ABNORMAL
EOSINOPHIL # BLD AUTO: 0 K/UL (ref 0–0.5)
EOSINOPHIL NFR BLD: 0.5 % (ref 0–8)
ERYTHROCYTE [DISTWIDTH] IN BLOOD BY AUTOMATED COUNT: 14.5 % (ref 11.5–14.5)
EST. GFR  (AFRICAN AMERICAN): >60 ML/MIN/1.73 M^2
EST. GFR  (NON AFRICAN AMERICAN): >60 ML/MIN/1.73 M^2
GLUCOSE SERPL-MCNC: 87 MG/DL (ref 70–110)
HCT VFR BLD AUTO: 41.8 % (ref 37–48.5)
HDLC SERPL-MCNC: 43 MG/DL (ref 40–75)
HDLC SERPL: 32.8 % (ref 20–50)
HGB BLD-MCNC: 13.2 G/DL (ref 12–16)
IMM GRANULOCYTES # BLD AUTO: 0.02 K/UL (ref 0–0.04)
IMM GRANULOCYTES NFR BLD AUTO: 0.3 % (ref 0–0.5)
LDLC SERPL CALC-MCNC: 68.8 MG/DL (ref 63–159)
LYMPHOCYTES # BLD AUTO: 1.9 K/UL (ref 1–4.8)
LYMPHOCYTES NFR BLD: 29.1 % (ref 18–48)
MCH RBC QN AUTO: 28.3 PG (ref 27–31)
MCHC RBC AUTO-ENTMCNC: 31.6 G/DL (ref 32–36)
MCV RBC AUTO: 90 FL (ref 82–98)
MONOCYTES # BLD AUTO: 0.8 K/UL (ref 0.3–1)
MONOCYTES NFR BLD: 12.1 % (ref 4–15)
NEUTROPHILS # BLD AUTO: 3.7 K/UL (ref 1.8–7.7)
NEUTROPHILS NFR BLD: 57.5 % (ref 38–73)
NONHDLC SERPL-MCNC: 88 MG/DL
NRBC BLD-RTO: 0 /100 WBC
PLATELET # BLD AUTO: 313 K/UL (ref 150–450)
PMV BLD AUTO: 11.3 FL (ref 9.2–12.9)
POTASSIUM SERPL-SCNC: 4.2 MMOL/L (ref 3.5–5.1)
PROT SERPL-MCNC: 6.9 G/DL (ref 6–8.4)
RBC # BLD AUTO: 4.66 M/UL (ref 4–5.4)
SODIUM SERPL-SCNC: 141 MMOL/L (ref 136–145)
TRIGL SERPL-MCNC: 96 MG/DL (ref 30–150)
WBC # BLD AUTO: 6.36 K/UL (ref 3.9–12.7)

## 2022-07-28 PROCEDURE — 99214 OFFICE O/P EST MOD 30 MIN: CPT | Mod: S$GLB,,, | Performed by: INTERNAL MEDICINE

## 2022-07-28 PROCEDURE — 1159F MED LIST DOCD IN RCRD: CPT | Mod: CPTII,S$GLB,, | Performed by: INTERNAL MEDICINE

## 2022-07-28 PROCEDURE — 1160F RVW MEDS BY RX/DR IN RCRD: CPT | Mod: CPTII,S$GLB,, | Performed by: INTERNAL MEDICINE

## 2022-07-28 PROCEDURE — 99214 PR OFFICE/OUTPT VISIT, EST, LEVL IV, 30-39 MIN: ICD-10-PCS | Mod: S$GLB,,, | Performed by: INTERNAL MEDICINE

## 2022-07-28 PROCEDURE — 1101F PR PT FALLS ASSESS DOC 0-1 FALLS W/OUT INJ PAST YR: ICD-10-PCS | Mod: CPTII,S$GLB,, | Performed by: INTERNAL MEDICINE

## 2022-07-28 PROCEDURE — 85025 COMPLETE CBC W/AUTO DIFF WBC: CPT | Performed by: INTERNAL MEDICINE

## 2022-07-28 PROCEDURE — 80053 COMPREHEN METABOLIC PANEL: CPT | Performed by: INTERNAL MEDICINE

## 2022-07-28 PROCEDURE — 3288F FALL RISK ASSESSMENT DOCD: CPT | Mod: CPTII,S$GLB,, | Performed by: INTERNAL MEDICINE

## 2022-07-28 PROCEDURE — 1101F PT FALLS ASSESS-DOCD LE1/YR: CPT | Mod: CPTII,S$GLB,, | Performed by: INTERNAL MEDICINE

## 2022-07-28 PROCEDURE — 99999 PR PBB SHADOW E&M-EST. PATIENT-LVL III: CPT | Mod: PBBFAC,,, | Performed by: INTERNAL MEDICINE

## 2022-07-28 PROCEDURE — 80061 LIPID PANEL: CPT | Performed by: INTERNAL MEDICINE

## 2022-07-28 PROCEDURE — 99999 PR PBB SHADOW E&M-EST. PATIENT-LVL III: ICD-10-PCS | Mod: PBBFAC,,, | Performed by: INTERNAL MEDICINE

## 2022-07-28 PROCEDURE — 1159F PR MEDICATION LIST DOCUMENTED IN MEDICAL RECORD: ICD-10-PCS | Mod: CPTII,S$GLB,, | Performed by: INTERNAL MEDICINE

## 2022-07-28 PROCEDURE — 3288F PR FALLS RISK ASSESSMENT DOCUMENTED: ICD-10-PCS | Mod: CPTII,S$GLB,, | Performed by: INTERNAL MEDICINE

## 2022-07-28 PROCEDURE — 1126F PR PAIN SEVERITY QUANTIFIED, NO PAIN PRESENT: ICD-10-PCS | Mod: CPTII,S$GLB,, | Performed by: INTERNAL MEDICINE

## 2022-07-28 PROCEDURE — 36415 COLL VENOUS BLD VENIPUNCTURE: CPT | Performed by: INTERNAL MEDICINE

## 2022-07-28 PROCEDURE — 1160F PR REVIEW ALL MEDS BY PRESCRIBER/CLIN PHARMACIST DOCUMENTED: ICD-10-PCS | Mod: CPTII,S$GLB,, | Performed by: INTERNAL MEDICINE

## 2022-07-28 PROCEDURE — 1126F AMNT PAIN NOTED NONE PRSNT: CPT | Mod: CPTII,S$GLB,, | Performed by: INTERNAL MEDICINE

## 2022-07-29 RX ORDER — AMLODIPINE BESYLATE 5 MG/1
5 TABLET ORAL 2 TIMES DAILY
Qty: 180 TABLET | Refills: 3 | Status: SHIPPED | OUTPATIENT
Start: 2022-07-29 | End: 2023-02-10

## 2022-07-29 NOTE — PROGRESS NOTES
CC:  Follow-up    HPI:  The patient is a 89-year-old female with hypertension, hyperlipidemia and DJD the left hip who presents today for follow-up.  The patient is currently on amlodipine for blood pressure.  She is also on atorvastatin for cholesterol.  She is currently living with her daughter.  Her left hip has gotten worse and she now walks with a walker.  She did not and still does not want surgery for her hip.    ROS:  Patient reports her weight is staying the same.  No visual changes.  No auditory changes.  No dysphagia.  No chest pain.  No shortness of breath.  No nausea vomiting.  No abdominal pain.  No bowel changes.  No bladder changes.  No weakness in arms or legs.  No skin changes.  No breast changes.  Her last mammogram was in 2019. She does not feel the need for another 1. No numbness or tingling arms or legs.    Physical exam:  General appearance:  No acute distress  HEENT:  Conjunctiva is clear.  She has bilateral lens replacements.  TMs are clear.  Nasal septum is midline without discharge.  Oropharynx is without erythema.  Trachea is midline without JVD or thyromegaly.  Pulmonary:  Good inspiratory, expiratory breath sounds are heard.  Lungs are clear auscultation.  Cardiovascular:  S1-S2, rhythm is normal.  2+ carotid pulse of bruits.  Extremities without edema.  GI:  Abdomen is nontender, nondistended without hepatosplenomegaly    Assessment:  1. Hypertension  2. Hyperlipidemia  3. DJD of the hip    Plan:  1. Will check a CBC, CMP and lipid panel today.

## 2022-12-29 ENCOUNTER — TELEPHONE (OUTPATIENT)
Dept: INTERNAL MEDICINE | Facility: CLINIC | Age: 87
End: 2022-12-29
Payer: MEDICARE

## 2022-12-29 NOTE — TELEPHONE ENCOUNTER
----- Message from Pool Knox sent at 12/29/2022  2:23 PM CST -----  Contact: [phone] Nilsa  Pt daughter would like a call back about getting home health care for her mother. Please call her back.

## 2023-01-04 ENCOUNTER — TELEPHONE (OUTPATIENT)
Dept: INTERNAL MEDICINE | Facility: CLINIC | Age: 88
End: 2023-01-04
Payer: MEDICARE

## 2023-01-04 NOTE — TELEPHONE ENCOUNTER
----- Message from Yeni Gay sent at 1/4/2023  8:44 AM CST -----  Contact: Daughter/ Nilsa [phone]  She wants to know if the patient can be seen sooner than 2/17/23 to be evaluated for Home Health Care.     Thank you

## 2023-01-04 NOTE — TELEPHONE ENCOUNTER
Attempted to contact daughter and give appt for 01/09/23 9:30 am with Dr. Marsh for HH evaluation left voice message. ALso sending pt portal msg. Appt held and msg sent to advanced .

## 2023-01-09 ENCOUNTER — OFFICE VISIT (OUTPATIENT)
Dept: INTERNAL MEDICINE | Facility: CLINIC | Age: 88
End: 2023-01-09
Payer: MEDICARE

## 2023-01-09 ENCOUNTER — LAB VISIT (OUTPATIENT)
Dept: LAB | Facility: HOSPITAL | Age: 88
End: 2023-01-09
Attending: INTERNAL MEDICINE
Payer: MEDICARE

## 2023-01-09 VITALS
HEART RATE: 67 BPM | DIASTOLIC BLOOD PRESSURE: 68 MMHG | SYSTOLIC BLOOD PRESSURE: 122 MMHG | HEIGHT: 68 IN | BODY MASS INDEX: 28.37 KG/M2 | OXYGEN SATURATION: 98 % | WEIGHT: 187.19 LBS

## 2023-01-09 DIAGNOSIS — E78.5 HYPERLIPIDEMIA, UNSPECIFIED HYPERLIPIDEMIA TYPE: ICD-10-CM

## 2023-01-09 DIAGNOSIS — I10 ESSENTIAL HYPERTENSION: Primary | ICD-10-CM

## 2023-01-09 DIAGNOSIS — I50.9 CONGESTIVE HEART FAILURE, UNSPECIFIED HF CHRONICITY, UNSPECIFIED HEART FAILURE TYPE: ICD-10-CM

## 2023-01-09 DIAGNOSIS — I10 ESSENTIAL HYPERTENSION: ICD-10-CM

## 2023-01-09 DIAGNOSIS — M16.12 PRIMARY OSTEOARTHRITIS OF LEFT HIP: ICD-10-CM

## 2023-01-09 LAB
ALBUMIN SERPL BCP-MCNC: 3.5 G/DL (ref 3.5–5.2)
ALP SERPL-CCNC: 103 U/L (ref 55–135)
ALT SERPL W/O P-5'-P-CCNC: 9 U/L (ref 10–44)
ANION GAP SERPL CALC-SCNC: 11 MMOL/L (ref 8–16)
AST SERPL-CCNC: 14 U/L (ref 10–40)
BACTERIA #/AREA URNS AUTO: ABNORMAL /HPF
BASOPHILS # BLD AUTO: 0.04 K/UL (ref 0–0.2)
BASOPHILS NFR BLD: 0.9 % (ref 0–1.9)
BILIRUB SERPL-MCNC: 0.4 MG/DL (ref 0.1–1)
BILIRUB UR QL STRIP: NEGATIVE
BUN SERPL-MCNC: 5 MG/DL (ref 10–30)
CALCIUM SERPL-MCNC: 8.8 MG/DL (ref 8.7–10.5)
CHLORIDE SERPL-SCNC: 109 MMOL/L (ref 95–110)
CHOLEST SERPL-MCNC: 161 MG/DL (ref 120–199)
CHOLEST/HDLC SERPL: 4.9 {RATIO} (ref 2–5)
CLARITY UR REFRACT.AUTO: CLEAR
CO2 SERPL-SCNC: 20 MMOL/L (ref 23–29)
COLOR UR AUTO: COLORLESS
CREAT SERPL-MCNC: 0.8 MG/DL (ref 0.5–1.4)
DIFFERENTIAL METHOD: ABNORMAL
EOSINOPHIL # BLD AUTO: 0 K/UL (ref 0–0.5)
EOSINOPHIL NFR BLD: 0.2 % (ref 0–8)
ERYTHROCYTE [DISTWIDTH] IN BLOOD BY AUTOMATED COUNT: 15.3 % (ref 11.5–14.5)
EST. GFR  (NO RACE VARIABLE): >60 ML/MIN/1.73 M^2
GLUCOSE SERPL-MCNC: 104 MG/DL (ref 70–110)
GLUCOSE UR QL STRIP: NEGATIVE
HCT VFR BLD AUTO: 40.2 % (ref 37–48.5)
HDLC SERPL-MCNC: 33 MG/DL (ref 40–75)
HDLC SERPL: 20.5 % (ref 20–50)
HGB BLD-MCNC: 12.5 G/DL (ref 12–16)
HGB UR QL STRIP: NEGATIVE
IMM GRANULOCYTES # BLD AUTO: 0.01 K/UL (ref 0–0.04)
IMM GRANULOCYTES NFR BLD AUTO: 0.2 % (ref 0–0.5)
KETONES UR QL STRIP: NEGATIVE
LDLC SERPL CALC-MCNC: 104.8 MG/DL (ref 63–159)
LEUKOCYTE ESTERASE UR QL STRIP: NEGATIVE
LYMPHOCYTES # BLD AUTO: 1.4 K/UL (ref 1–4.8)
LYMPHOCYTES NFR BLD: 30.7 % (ref 18–48)
MCH RBC QN AUTO: 27.5 PG (ref 27–31)
MCHC RBC AUTO-ENTMCNC: 31.1 G/DL (ref 32–36)
MCV RBC AUTO: 88 FL (ref 82–98)
MICROSCOPIC COMMENT: ABNORMAL
MONOCYTES # BLD AUTO: 0.7 K/UL (ref 0.3–1)
MONOCYTES NFR BLD: 14.4 % (ref 4–15)
NEUTROPHILS # BLD AUTO: 2.5 K/UL (ref 1.8–7.7)
NEUTROPHILS NFR BLD: 53.6 % (ref 38–73)
NITRITE UR QL STRIP: NEGATIVE
NONHDLC SERPL-MCNC: 128 MG/DL
NRBC BLD-RTO: 0 /100 WBC
PH UR STRIP: 7 [PH] (ref 5–8)
PLATELET # BLD AUTO: 315 K/UL (ref 150–450)
PMV BLD AUTO: 11.3 FL (ref 9.2–12.9)
POTASSIUM SERPL-SCNC: 3.9 MMOL/L (ref 3.5–5.1)
PROT SERPL-MCNC: 6.9 G/DL (ref 6–8.4)
PROT UR QL STRIP: NEGATIVE
RBC # BLD AUTO: 4.55 M/UL (ref 4–5.4)
RBC #/AREA URNS AUTO: 1 /HPF (ref 0–4)
SODIUM SERPL-SCNC: 140 MMOL/L (ref 136–145)
SP GR UR STRIP: 1 (ref 1–1.03)
SQUAMOUS #/AREA URNS AUTO: 3 /HPF
TRIGL SERPL-MCNC: 116 MG/DL (ref 30–150)
URN SPEC COLLECT METH UR: ABNORMAL
WBC # BLD AUTO: 4.59 K/UL (ref 3.9–12.7)
WBC #/AREA URNS AUTO: 6 /HPF (ref 0–5)

## 2023-01-09 PROCEDURE — 1126F AMNT PAIN NOTED NONE PRSNT: CPT | Mod: HCNC,CPTII,S$GLB, | Performed by: INTERNAL MEDICINE

## 2023-01-09 PROCEDURE — 99999 PR PBB SHADOW E&M-EST. PATIENT-LVL III: CPT | Mod: PBBFAC,HCNC,, | Performed by: INTERNAL MEDICINE

## 2023-01-09 PROCEDURE — 1159F MED LIST DOCD IN RCRD: CPT | Mod: HCNC,CPTII,S$GLB, | Performed by: INTERNAL MEDICINE

## 2023-01-09 PROCEDURE — 99214 OFFICE O/P EST MOD 30 MIN: CPT | Mod: HCNC,S$GLB,, | Performed by: INTERNAL MEDICINE

## 2023-01-09 PROCEDURE — 1159F PR MEDICATION LIST DOCUMENTED IN MEDICAL RECORD: ICD-10-PCS | Mod: HCNC,CPTII,S$GLB, | Performed by: INTERNAL MEDICINE

## 2023-01-09 PROCEDURE — 81001 URINALYSIS AUTO W/SCOPE: CPT | Mod: HCNC | Performed by: INTERNAL MEDICINE

## 2023-01-09 PROCEDURE — 1126F PR PAIN SEVERITY QUANTIFIED, NO PAIN PRESENT: ICD-10-PCS | Mod: HCNC,CPTII,S$GLB, | Performed by: INTERNAL MEDICINE

## 2023-01-09 PROCEDURE — 3288F PR FALLS RISK ASSESSMENT DOCUMENTED: ICD-10-PCS | Mod: HCNC,CPTII,S$GLB, | Performed by: INTERNAL MEDICINE

## 2023-01-09 PROCEDURE — 80061 LIPID PANEL: CPT | Mod: HCNC | Performed by: INTERNAL MEDICINE

## 2023-01-09 PROCEDURE — 3288F FALL RISK ASSESSMENT DOCD: CPT | Mod: HCNC,CPTII,S$GLB, | Performed by: INTERNAL MEDICINE

## 2023-01-09 PROCEDURE — 1101F PT FALLS ASSESS-DOCD LE1/YR: CPT | Mod: HCNC,CPTII,S$GLB, | Performed by: INTERNAL MEDICINE

## 2023-01-09 PROCEDURE — 99999 PR PBB SHADOW E&M-EST. PATIENT-LVL III: ICD-10-PCS | Mod: PBBFAC,HCNC,, | Performed by: INTERNAL MEDICINE

## 2023-01-09 PROCEDURE — 85025 COMPLETE CBC W/AUTO DIFF WBC: CPT | Mod: HCNC | Performed by: INTERNAL MEDICINE

## 2023-01-09 PROCEDURE — 80053 COMPREHEN METABOLIC PANEL: CPT | Mod: HCNC | Performed by: INTERNAL MEDICINE

## 2023-01-09 PROCEDURE — 1101F PR PT FALLS ASSESS DOC 0-1 FALLS W/OUT INJ PAST YR: ICD-10-PCS | Mod: HCNC,CPTII,S$GLB, | Performed by: INTERNAL MEDICINE

## 2023-01-09 PROCEDURE — 36415 COLL VENOUS BLD VENIPUNCTURE: CPT | Mod: HCNC | Performed by: INTERNAL MEDICINE

## 2023-01-09 PROCEDURE — 99214 PR OFFICE/OUTPT VISIT, EST, LEVL IV, 30-39 MIN: ICD-10-PCS | Mod: HCNC,S$GLB,, | Performed by: INTERNAL MEDICINE

## 2023-01-09 NOTE — PROGRESS NOTES
CC:  Follow-up plus evaluation for home health     HPI:  The patient is a 89-year-old female with hypertension, hyperlipidemia and DJD of left hip who presents today with her daughter for evaluation of possible home health.  The patient has DJD of the left hip.  Now she started developed some pain in the right hip as well.  She is not able to walk with a walker anymore.  She ambulates around the home by pushing herself in a wheelchair.  Her daughter has back arthritic problems as well.  It is difficult for her to take care of her mother.  She is asking for assistance with ADLs.  She is asking for a shower chair as well.  They do not have any medical equipment at the home.      ROS:  Patient reports a good appetite.  No visual changes.  No auditory changes.  No chest pain.  No shortness of breath.  No nausea vomiting.  No abdominal pain.  She does not have a bowel movement daily.  She will use a [employer] as opposed to [employer].  She is becoming more forgetful.  Her daughter does not let her cook because she has burn food on the stove before.  She ask the same question over and over again.      Physical exam:   General appearance:  No acute distress .  Patient is currently sitting in a wheelchair.  HEENT: Conjunctiva is clear.  Pupils equal.  She has bilateral lens replacements.  TMs are clear.  Nasal septum is midline without discharge.  Oropharynx without erythema.  Trachea is midline without JVD.    Pulmonary:  Good inspiratory, expiratory breath sounds are heard.  Lungs are clear to auscultation.    Cardiovascular:  S1-S2, rhythm appear to be normal.  2+ carotid pulses without bruits.  Extremities with trace edema.    GI: Abdomen was nontender.  He would normal bowel sounds.    Assessment:  1. Hypertension  2.  Hyperlipidemia   3. DJD of the hip  4.  Cognitive impairment     Plan:  1. Put a referral in to case management  2. Will arrange for a shower chair   3. Will schedule a CBC , CMP

## 2023-01-23 ENCOUNTER — OUTPATIENT CASE MANAGEMENT (OUTPATIENT)
Dept: ADMINISTRATIVE | Facility: OTHER | Age: 88
End: 2023-01-23
Payer: MEDICARE

## 2023-01-24 NOTE — PROGRESS NOTES
"Outpatient Care Management   - Low Risk Patient Assessment    Patient: Dalila Garcia  MRN:  [id number]  Date of Service:  1/24/2023  Completed by:  Sue Wills LMSW  Referral Date: 01/09/2023    Reason for Visit   Patient presents with    Social Work Assessment - Low/Mod Risk       Brief Summary: received a referral from patient PCP to assist with more in-home care.  Daughter reports she and patient resides together. Daughter reports assisting patient with ADL's but do to health reason she's no longer able to. Carolyn reports this is the reason why she asked PCP for HH services. SW explained to daughter the difference between HH and home care service. "Home health care provides clinical or skilled care by licensed nurses and therapists. Home care provides non-clinical or non-skilled care by professional caregivers." Daughter seeking more in home care. Daughter reports she manages patient medications and blood pressure readings therefore don't need a SN. Daughter seeking a CAN to assist with ADL's. GIACOMO explored resources available through the community. Daughter denied patient has a long-term care policy. Daughter denied family can hire a caregiver. Daughter reports patient spouse was in the . Daughter unsure if patient is receiving any type of compensation from the . Daughter an agreement with GIACOMO providing resources for Aid and Attendance program. This program assists Veterans and Widows with non-medical services. Patient local [place] [facility] doesn't assist with PCA services. SW provided daughter with information on Medicaid CCW program to assist with non-medical services. This resource is provided by the [facility]. GIACOMO explained to daughter there's a wait, list for the above services. wait list is dating back to Oct 2022. Daughter reports the continuation of assessment is no longer needed as this was the only needed. GIACOMO provided all available community resources " for more in home support. SW will close case as partially completed

## 2023-01-27 ENCOUNTER — OUTPATIENT CASE MANAGEMENT (OUTPATIENT)
Dept: ADMINISTRATIVE | Facility: OTHER | Age: 88
End: 2023-01-27
Payer: MEDICARE

## 2023-02-07 DIAGNOSIS — Z00.00 ENCOUNTER FOR MEDICARE ANNUAL WELLNESS EXAM: ICD-10-CM

## 2023-02-09 DIAGNOSIS — Z00.00 ENCOUNTER FOR MEDICARE ANNUAL WELLNESS EXAM: ICD-10-CM

## 2023-02-10 RX ORDER — AMLODIPINE BESYLATE 5 MG/1
TABLET ORAL
Qty: 180 TABLET | Refills: 3 | Status: SHIPPED | OUTPATIENT
Start: 2023-02-10 | End: 2023-12-14 | Stop reason: SDUPTHER

## 2023-02-10 NOTE — TELEPHONE ENCOUNTER
No new care gaps identified.  [facility] Embedded Care Gaps. Reference number: [id number]. 2/10/2023   3:53:59 AM CST

## 2023-02-10 NOTE — TELEPHONE ENCOUNTER
Refill Decision Note   Dalila Jose  is requesting a refill authorization.  Brief Assessment and Rationale for Refill:  Approve     Medication Therapy Plan:       Medication Reconciliation Completed: No   Comments:     No Care Gaps recommended.     Note composed:9:52 AM 02/10/2023

## 2023-04-18 NOTE — TELEPHONE ENCOUNTER
----- Message from Shakira Ibarra sent at 8/3/2017 11:05 AM CDT -----  Regarding: EKG ORDER  Please put in EKG order, thanks. Shakira [id number]   Nasal Turnover Hinge Flap Text: The defect edges were debeveled with a #15 scalpel blade.  Given the size, depth, location of the defect and the defect being full thickness a nasal turnover hinge flap was deemed most appropriate. Using a sterile surgical marker, an appropriate hinge flap was drawn incorporating the defect. The area thus outlined was incised with a #15 scalpel blade. The flap was designed to recreate the nasal mucosal lining and the alar rim. The skin margins were undermined to an appropriate distance in all directions utilizing iris scissors. Following this, the designed flap was placed into the primary defect and sutured into place

## 2023-11-10 ENCOUNTER — TELEPHONE (OUTPATIENT)
Dept: INTERNAL MEDICINE | Facility: CLINIC | Age: 88
End: 2023-11-10
Payer: MEDICARE

## 2023-11-10 NOTE — TELEPHONE ENCOUNTER
----- Message from Clover English sent at 11/10/2023  2:24 PM CST -----  Contact: [phone] arnold  [email or web address]     Patient is calling for Medical Advice regarding:Patient need a hospital bed  for patient but does not know where to get it or how to get it    How long has patient had these symptoms:    Pharmacy name and phone#:    [facility]:  call     Comments:

## 2023-11-13 ENCOUNTER — TELEPHONE (OUTPATIENT)
Dept: INTERNAL MEDICINE | Facility: CLINIC | Age: 88
End: 2023-11-13
Payer: MEDICARE

## 2023-11-13 NOTE — TELEPHONE ENCOUNTER
Pt's daughter is requesting a hospital bed and bed tray.    Please review and respond,  Thank You.

## 2023-11-13 NOTE — TELEPHONE ENCOUNTER
----- Message from Jazmín Valdivia sent at 11/13/2023 12:25 PM CST -----  Contact: [phone] @ patient daughter  Good afternoon patient daughter would like a call back to discuss getting the patient a hospital bed and bed tray. Please give her a call back [phone]

## 2023-11-21 ENCOUNTER — TELEPHONE (OUTPATIENT)
Dept: INTERNAL MEDICINE | Facility: CLINIC | Age: 88
End: 2023-11-21
Payer: MEDICARE

## 2023-11-21 NOTE — TELEPHONE ENCOUNTER
"----- Message from Cricketemily Victor Hugo sent at 11/21/2023  7:45 AM CST -----  Contact: Patient Portal [phone]  Good morning, the patient sent the message below to the appointment request section of the patient portal by mistake.    "Ok I will. Is there anyway we can just do a virtual. We're really just trying to get a hospital bed and bed tray. Can y'all help us with that without a visit. Medicare and her insurance need an order from Dr. Handley. Please help."    Thank you    "

## 2023-11-21 NOTE — TELEPHONE ENCOUNTER
"Note from the daughter about appt for hospital bed and tray.    "Ok I will. Is there anyway we can just do a virtual. We're really just trying to get a hospital bed and bed tray. Can y'all help us with that without a visit. Medicare and her insurance need an order from Dr. Handley. Please help."     Thank you     Please review and respond,  Thank You.    "

## 2023-11-21 NOTE — TELEPHONE ENCOUNTER
Responded informing that Dr. Marsh recommends an in office appt, also informed that 11/29/2023 at 1 pm is available to let me asap before it is gone.

## 2023-11-26 ENCOUNTER — HOSPITAL ENCOUNTER (EMERGENCY)
Facility: OTHER | Age: 88
Discharge: HOME | End: 2023-11-26
Attending: EMERGENCY MEDICINE
Payer: MEDICARE

## 2023-11-26 VITALS
HEART RATE: 74 BPM | TEMPERATURE: 98 F | BODY MASS INDEX: 25.11 KG/M2 | OXYGEN SATURATION: 96 % | SYSTOLIC BLOOD PRESSURE: 178 MMHG | DIASTOLIC BLOOD PRESSURE: 88 MMHG | WEIGHT: 160 LBS | HEIGHT: 67 IN | RESPIRATION RATE: 18 BRPM

## 2023-11-26 DIAGNOSIS — E86.0 DEHYDRATION: ICD-10-CM

## 2023-11-26 DIAGNOSIS — R53.83 FATIGUE: ICD-10-CM

## 2023-11-26 DIAGNOSIS — R11.2 NAUSEA AND VOMITING, UNSPECIFIED VOMITING TYPE: Primary | ICD-10-CM

## 2023-11-26 DIAGNOSIS — F03.90 DEMENTIA, UNSPECIFIED DEMENTIA SEVERITY, UNSPECIFIED DEMENTIA TYPE, UNSPECIFIED WHETHER BEHAVIORAL, PSYCHOTIC, OR MOOD DISTURBANCE OR ANXIETY: ICD-10-CM

## 2023-11-26 LAB
ALBUMIN SERPL BCP-MCNC: 3.4 G/DL (ref 3.5–5.2)
ALP SERPL-CCNC: 98 U/L (ref 55–135)
ALT SERPL W/O P-5'-P-CCNC: 15 U/L (ref 10–44)
ANION GAP SERPL CALC-SCNC: 17 MMOL/L (ref 8–16)
ANISOCYTOSIS BLD QL SMEAR: SLIGHT
AST SERPL-CCNC: 22 U/L (ref 10–40)
BACTERIA #/AREA URNS HPF: ABNORMAL /HPF
BASOPHILS # BLD AUTO: 0.05 K/UL (ref 0–0.2)
BASOPHILS NFR BLD: 0.7 % (ref 0–1.9)
BILIRUB SERPL-MCNC: 1 MG/DL (ref 0.1–1)
BILIRUB UR QL STRIP: NEGATIVE
BUN SERPL-MCNC: 10 MG/DL (ref 10–30)
CALCIUM SERPL-MCNC: 8.8 MG/DL (ref 8.7–10.5)
CHLORIDE SERPL-SCNC: 106 MMOL/L (ref 95–110)
CLARITY UR: CLEAR
CO2 SERPL-SCNC: 17 MMOL/L (ref 23–29)
COLOR UR: YELLOW
CREAT SERPL-MCNC: 0.8 MG/DL (ref 0.5–1.4)
CREAT SERPL-MCNC: 0.9 MG/DL (ref 0.5–1.4)
CTP QC/QA: YES
CTP QC/QA: YES
DACRYOCYTES BLD QL SMEAR: ABNORMAL
DIFFERENTIAL METHOD: ABNORMAL
EOSINOPHIL # BLD AUTO: 0 K/UL (ref 0–0.5)
EOSINOPHIL NFR BLD: 0.3 % (ref 0–8)
ERYTHROCYTE [DISTWIDTH] IN BLOOD BY AUTOMATED COUNT: 16.2 % (ref 11.5–14.5)
EST. GFR  (NO RACE VARIABLE): 59 ML/MIN/1.73 M^2
GIANT PLATELETS BLD QL SMEAR: PRESENT
GLUCOSE SERPL-MCNC: 125 MG/DL (ref 70–110)
GLUCOSE UR QL STRIP: NEGATIVE
HCT VFR BLD AUTO: 48.5 % (ref 37–48.5)
HGB BLD-MCNC: 16.3 G/DL (ref 12–16)
HGB UR QL STRIP: NEGATIVE
HYALINE CASTS #/AREA URNS LPF: 25 /LPF
IMM GRANULOCYTES # BLD AUTO: 0.03 K/UL (ref 0–0.04)
IMM GRANULOCYTES NFR BLD AUTO: 0.4 % (ref 0–0.5)
KETONES UR QL STRIP: NEGATIVE
LEUKOCYTE ESTERASE UR QL STRIP: NEGATIVE
LIPASE SERPL-CCNC: 33 U/L (ref 4–60)
LYMPHOCYTES # BLD AUTO: 2.4 K/UL (ref 1–4.8)
LYMPHOCYTES NFR BLD: 32.7 % (ref 18–48)
MCH RBC QN AUTO: 28.6 PG (ref 27–31)
MCHC RBC AUTO-ENTMCNC: 33.6 G/DL (ref 32–36)
MCV RBC AUTO: 85 FL (ref 82–98)
MICROSCOPIC COMMENT: ABNORMAL
MONOCYTES # BLD AUTO: 1 K/UL (ref 0.3–1)
MONOCYTES NFR BLD: 13.9 % (ref 4–15)
NEUTROPHILS # BLD AUTO: 3.7 K/UL (ref 1.8–7.7)
NEUTROPHILS NFR BLD: 52 % (ref 38–73)
NITRITE UR QL STRIP: NEGATIVE
NRBC BLD-RTO: 0 /100 WBC
PH UR STRIP: 7 [PH] (ref 5–8)
PLATELET # BLD AUTO: 276 K/UL (ref 150–450)
PLATELET BLD QL SMEAR: ABNORMAL
PMV BLD AUTO: 10.6 FL (ref 9.2–12.9)
POC MOLECULAR INFLUENZA A AGN: NEGATIVE
POC MOLECULAR INFLUENZA B AGN: NEGATIVE
POIKILOCYTOSIS BLD QL SMEAR: SLIGHT
POTASSIUM SERPL-SCNC: 3.8 MMOL/L (ref 3.5–5.1)
PROT SERPL-MCNC: 7 G/DL (ref 6–8.4)
PROT UR QL STRIP: ABNORMAL
RBC # BLD AUTO: 5.7 M/UL (ref 4–5.4)
RBC #/AREA URNS HPF: 3 /HPF (ref 0–4)
SAMPLE: NORMAL
SARS-COV-2 RDRP RESP QL NAA+PROBE: NEGATIVE
SODIUM SERPL-SCNC: 140 MMOL/L (ref 136–145)
SP GR UR STRIP: 1.02 (ref 1–1.03)
SQUAMOUS #/AREA URNS HPF: 0 /HPF
URN SPEC COLLECT METH UR: ABNORMAL
UROBILINOGEN UR STRIP-ACNC: ABNORMAL EU/DL
WBC # BLD AUTO: 7.19 K/UL (ref 3.9–12.7)
WBC #/AREA URNS HPF: 1 /HPF (ref 0–5)

## 2023-11-26 PROCEDURE — 96361 HYDRATE IV INFUSION ADD-ON: CPT | Mod: HCNC

## 2023-11-26 PROCEDURE — 80053 COMPREHEN METABOLIC PANEL: CPT | Mod: HCNC | Performed by: PHYSICIAN ASSISTANT

## 2023-11-26 PROCEDURE — 83690 ASSAY OF LIPASE: CPT | Mod: HCNC | Performed by: PHYSICIAN ASSISTANT

## 2023-11-26 PROCEDURE — 25000003 PHARM REV CODE 250: Mod: HCNC | Performed by: PHYSICIAN ASSISTANT

## 2023-11-26 PROCEDURE — 81000 URINALYSIS NONAUTO W/SCOPE: CPT | Mod: HCNC | Performed by: PHYSICIAN ASSISTANT

## 2023-11-26 PROCEDURE — 87635 SARS-COV-2 COVID-19 AMP PRB: CPT | Mod: HCNC | Performed by: PHYSICIAN ASSISTANT

## 2023-11-26 PROCEDURE — 63600175 PHARM REV CODE 636 W HCPCS: Mod: HCNC | Performed by: PHYSICIAN ASSISTANT

## 2023-11-26 PROCEDURE — 85025 COMPLETE CBC W/AUTO DIFF WBC: CPT | Mod: HCNC | Performed by: PHYSICIAN ASSISTANT

## 2023-11-26 PROCEDURE — 96374 THER/PROPH/DIAG INJ IV PUSH: CPT | Mod: HCNC

## 2023-11-26 PROCEDURE — 99284 EMERGENCY DEPT VISIT MOD MDM: CPT | Mod: 25,HCNC

## 2023-11-26 RX ORDER — ONDANSETRON 4 MG/1
4 TABLET, ORALLY DISINTEGRATING ORAL EVERY 8 HOURS PRN
Qty: 30 TABLET | Refills: 0 | Status: ON HOLD | OUTPATIENT
Start: 2023-11-26 | End: 2023-12-13

## 2023-11-26 RX ORDER — ONDANSETRON 2 MG/ML
4 INJECTION INTRAMUSCULAR; INTRAVENOUS
Status: COMPLETED | OUTPATIENT
Start: 2023-11-26 | End: 2023-11-26

## 2023-11-26 RX ADMIN — SODIUM CHLORIDE 1000 ML: 9 INJECTION, SOLUTION INTRAVENOUS at 12:11

## 2023-11-26 RX ADMIN — ONDANSETRON 4 MG: 2 INJECTION INTRAMUSCULAR; INTRAVENOUS at 12:11

## 2023-11-26 NOTE — ED NOTES
-Pt BIB daughter for change in mentation and increase in lethargy, denies fever/chills  LOC: The patient is awake, alert and aware of environment with an appropriate affect, the patient is oriented to self and daughter only, hx of worsening memory issues  APPEARANCE: Patient resting comfortably and in no acute distress, patient is clean and well groomed, patient's clothing is properly fastened.  SKIN: The skin is warm and dry, patient has normal skin turgor and moist mucus membranes, skin intact, no breakdown or brusing noted.  MUSKULOSKELETAL: Patient moving all extremities well, no obvious swelling or deformities noted, did not check gait at this time, deferred due to altered mentation and lethargy  RESPIRATORY: Airway is open and patent, respirations are spontaneous, patient has a normal effort and rate. Breath sounds are clear and equal bilaterally.  CARDIAC: Normal heart sounds. No peripheral edema.  ABDOMEN: Soft and non tender to palpation, no distention noted.

## 2023-11-26 NOTE — ED PROVIDER NOTES
Source of History:  Patient and daughter.  Limited history provided by patient given her baseline dementia    Chief complaint:  Emesis (Since yesterday, aughter reports lethary this AM.  +decreased and dark urine per daughter.  Pt has of dementia, oriented to self and place, disoriented to time)      HPI:  Dalila Garcia is a 89 y.o. female presenting with increased fatigue.  Daughter at the bedside who is visiting from [place] however quite active in her mother's care presents accompanying her mother for concern fatigue and dark urine.  Daughter does report that patient's dementia has accelerated since approximately April.  They have noted change in appetite since this time and are working with primary care/dermatologist.  Daughter reports over the past several days symptoms have intensified.  They do report occasional nausea and emesis x2 episodes with last 1 on the way here.  Daughter reported dark urine and was concerned about possible UTI.  Patient denies any pain currently.  She endorse to daughter earlier today not feeling well however denied you elaborate on this statement.  Patient denies any complaints at this time    This is the extent to the patients complaints today here in the emergency department.    ROS: As per HPI    Review of patient's allergies indicates:   Allergen Reactions    Lotensin [benazepril] Edema    Celexa [citalopram] Other (See Comments)     Hot flashes        PMH:  As per HPI and below:  Past Medical History:   Diagnosis Date    Anxiety     Arthritis     Cataract     Congestive heart failure     Dementia     Dry eye syndrome     Dry senile macular degeneration     Hypertension     Joint pain     Posterior capsular opacification, both eyes     Psychiatric problem     PVD (peripheral vascular disease)      Past Surgical History:   Procedure Laterality Date    CATARACT EXTRACTION      both eyes    HYSTERECTOMY      TOTAL KNEE ARTHROPLASTY      right       Social History     Tobacco  "Use    Smoking status: Former     Current packs/day: 0.00     Types: Cigarettes     Quit date: 2005     Years since quittin.5    Smokeless tobacco: Never   Substance Use Topics    Alcohol use: No    Drug use: No       Physical Exam:    BP (!) 178/88   Pulse 74   Temp 98.4 °F (36.9 °C) (Rectal)   Resp 18   Ht 5' 7" (1.702 m)   Wt 72.6 kg (160 lb)   SpO2 96%   BMI 25.06 kg/m²   Nursing note and vital signs reviewed.  Constitutional: No acute distress.  Nontoxic  Eyes: No conjunctival injection.Extraocular muscles are intact.  ENT: Oropharynx clear.  Normal phonation.  Mucous membranes are dry  Cardiovascular: Regular rate and rhythm.  No murmurs. No gallops. No rubs  Respiratory: Clear to auscultation bilaterally.  Good air movement.  No wheezes.  No rhonchi. No rales. No accessory muscle use..  Abdomen: Soft.  Not distended.  Nontender.  No guarding.  No rebound. Non-peritoneal.  Musculoskeletal: Good range of motion all joints.  No deformities.  Neck supple.  No meningismus.  Skin: No rashes seen.  Good turgor.  No abrasions.  No ecchymoses.  Neurologic: Motor intact.  Sensation intact.  Oriented to herself and place  Psych: Appropriate, conversant    Labs that have been ordered have been independently reviewed and interpreted by myself.    I decided to obtain the patient's medical records.      MDM/ Differential Dx:    Dalila Garcia 89 y.o. presented to the ED with c/o fatigue. Physical exam reveals well-appearing female for mildly dry mucous membranes.  She appears comfortable as no complaints at this time.  No grimacing on.  No obvious bony tenderness on exam.  Oriented to herself and place.  Daughter reports baseline.  No additional abnormalities appreciated at this time    Differential Diagnosis includes, but is not limited to:  anemia/blood loss, arrhythmia,hypotension, dehydration, medication side effect, liver disease,metabolic derangement, UTI, dehydration      ED management:  Plan for " IV fluids, UA, flu, COVID and basic labs to assess for acute emergent process although I suspect on progression of dementia contributory to decreased appetite.  Patient vomiting episodes here.  Labs consistent with mild hemoconcentration with hyaline cast in urine and hemoglobin at 16.  CO2 is 17 and anion gap at 17 consistent history of decreased intake variation ketoacidosis.  She was given IV fluids here.  Not want to give additional as she is now tolerating by mouth did not want to potentiate any exacerbation of her CHF.  Once again no recurrent vomiting.  Discuss labs order encouraged increased hydration and will send home with antiemetic and prompt return for any new or worsening symptoms or not tolerating by mouth    Impression/Plan: Patient informed of diagnosis The primary encounter diagnosis was Nausea and vomiting, unspecified vomiting type. Diagnoses of Fatigue, Dementia, unspecified dementia severity, unspecified dementia type, unspecified whether behavioral, psychotic, or mood disturbance or anxiety, and Dehydration were also pertinent to this visit. Patient will follow up with Primary.  Patient cautioned on when to return to ED.  Pt. Understands and agrees with current treatment plan      Results for orders placed or performed during the hospital encounter of 11/26/23   CBC W/ AUTO DIFFERENTIAL   Result Value Ref Range    WBC 7.19 3.90 - 12.70 K/uL    RBC 5.70 (H) 4.00 - 5.40 M/uL    Hemoglobin 16.3 (H) 12.0 - 16.0 g/dL    Hematocrit 48.5 37.0 - 48.5 %    MCV 85 82 - 98 fL    MCH 28.6 27.0 - 31.0 pg    MCHC 33.6 32.0 - 36.0 g/dL    RDW 16.2 (H) 11.5 - 14.5 %    Platelets 276 150 - 450 K/uL    MPV 10.6 9.2 - 12.9 fL    Immature Granulocytes 0.4 0.0 - 0.5 %    Gran # (ANC) 3.7 1.8 - 7.7 K/uL    Immature Grans (Abs) 0.03 0.00 - 0.04 K/uL    Lymph # 2.4 1.0 - 4.8 K/uL    Mono # 1.0 0.3 - 1.0 K/uL    Eos # 0.0 0.0 - 0.5 K/uL    Baso # 0.05 0.00 - 0.20 K/uL    nRBC 0 0 /100 WBC    Gran % 52.0 38.0 - 73.0 %     Lymph % 32.7 18.0 - 48.0 %    Mono % 13.9 4.0 - 15.0 %    Eosinophil % 0.3 0.0 - 8.0 %    Basophil % 0.7 0.0 - 1.9 %    Platelet Estimate Appears normal     Aniso Slight     Poik Slight     Tear Drop Cells Occasional     Large/Giant Platelets Present     Differential Method Automated    Comp. Metabolic Panel   Result Value Ref Range    Sodium 140 136 - 145 mmol/L    Potassium 3.8 3.5 - 5.1 mmol/L    Chloride 106 95 - 110 mmol/L    CO2 17 (L) 23 - 29 mmol/L    Glucose 125 (H) 70 - 110 mg/dL    BUN 10 10 - 30 mg/dL    Creatinine 0.9 0.5 - 1.4 mg/dL    Calcium 8.8 8.7 - 10.5 mg/dL    Total Protein 7.0 6.0 - 8.4 g/dL    Albumin 3.4 (L) 3.5 - 5.2 g/dL    Total Bilirubin 1.0 0.1 - 1.0 mg/dL    Alkaline Phosphatase 98 55 - 135 U/L    AST 22 10 - 40 U/L    ALT 15 10 - 44 U/L    eGFR 59 (A) >60 mL/min/1.73 m^2    Anion Gap 17 (H) 8 - 16 mmol/L   Lipase   Result Value Ref Range    Lipase 33 4 - 60 U/L   Urinalysis, Reflex to Urine Culture Urine, Catheterized    Specimen: Urine   Result Value Ref Range    Specimen UA Urine, Catheterized     Color, UA Yellow Yellow, Straw, Liliya    Appearance, UA Clear Clear    pH, UA 7.0 5.0 - 8.0    Specific Gravity, UA 1.020 1.005 - 1.030    Protein, UA 1+ (A) Negative    Glucose, UA Negative Negative    Ketones, UA Negative Negative    Bilirubin (UA) Negative Negative    Occult Blood UA Negative Negative    Nitrite, UA Negative Negative    Urobilinogen, UA 4.0-6.0 (A) <2.0 EU/dL    Leukocytes, UA Negative Negative   Urinalysis Microscopic   Result Value Ref Range    RBC, UA 3 0 - 4 /hpf    WBC, UA 1 0 - 5 /hpf    Bacteria None None-Occ /hpf    Squam Epithel, UA 0 /hpf    Hyaline Casts, UA 25 (A) 0-1/lpf /lpf    Microscopic Comment SEE COMMENT    POCT Influenza A/B Molecular   Result Value Ref Range    POC Molecular Influenza A Ag Negative Negative, Not Reported    POC Molecular Influenza B Ag Negative Negative, Not Reported     Acceptable Yes    POCT COVID-19 Rapid  "Screening   Result Value Ref Range    POC Rapid COVID Negative Negative     Acceptable Yes    ISTAT CREATININE   Result Value Ref Range    POC Creatinine 0.8 0.5 - 1.4 mg/dL    Sample VENOUS      Imaging Results              X-Ray Chest AP Portable (Final result)  Result time 11/26/23 13:22:08      Final result by Adrián Perez MD (11/26/23 13:22:08)                   Impression:      No acute cardiopulmonary finding identified on this single view.      Electronically signed by: Adrián Perez MD  Date:    11/26/2023  Time:    13:22               Narrative:    EXAMINATION:  XR CHEST AP PORTABLE    CLINICAL HISTORY:  Provided history is "  Other fatigue".    TECHNIQUE:  One view of the chest.    COMPARISON:  12/10/2014.    FINDINGS:  Cardiomediastinal silhouette is magnified by portable technique but not felt to be enlarged.  There are coarse interstitial lung markings but no large focal area of consolidation.  No sizable pleural effusion.  No pneumothorax.  Degenerative changes in both shoulders.                                                Diagnostic Impression:    1. Nausea and vomiting, unspecified vomiting type    2. Fatigue    3. Dementia, unspecified dementia severity, unspecified dementia type, unspecified whether behavioral, psychotic, or mood disturbance or anxiety    4. Dehydration         ED Disposition Condition    Discharge Stable            ED Prescriptions       Medication Sig Dispense Start Date End Date Auth. Provider    ondansetron (ZOFRAN-ODT) 4 MG TbDL Take 1 tablet (4 mg total) by mouth every 8 (eight) hours as needed. 30 tablet 11/26/2023 -- Suzanne Winslow PA          Follow-up Information       Follow up With Specialties Details Why Contact Info    Vazquez Marsh MD Internal Medicine Schedule an appointment as soon as possible for a visit   [address] [place] [id number]  [phone]                 Suzanne Winslow PA  11/26/23 2016    "

## 2023-11-27 ENCOUNTER — PATIENT OUTREACH (OUTPATIENT)
Dept: EMERGENCY MEDICINE | Facility: OTHER | Age: 88
End: 2023-11-27
Payer: MEDICARE

## 2023-11-27 NOTE — PROGRESS NOTES
Patient was seen in the ED on 11/26/23 for nausea, vomiting, dementia. They have an appointment scheduled with Dr. Vazquez Marsh on 11/29/23 at 1:00. ED navigator will remind patient of appointment.

## 2023-11-28 ENCOUNTER — PATIENT OUTREACH (OUTPATIENT)
Dept: EMERGENCY MEDICINE | Facility: OTHER | Age: 88
End: 2023-11-28
Payer: MEDICARE

## 2023-11-28 NOTE — PROGRESS NOTES
ED Navigator reminded the patient of scheduled appointment with Dr. Vazquez Marsh on 11/29/23 at 1:00. Patient instructed to bring a photo I.D., health insurance card, and a list of current medications. Patient agreed to appointment date and time. Patient declined additional services. Follow up encounter closed.

## 2023-11-29 ENCOUNTER — LAB VISIT (OUTPATIENT)
Dept: LAB | Facility: HOSPITAL | Age: 88
End: 2023-11-29
Attending: INTERNAL MEDICINE
Payer: MEDICARE

## 2023-11-29 ENCOUNTER — OFFICE VISIT (OUTPATIENT)
Dept: INTERNAL MEDICINE | Facility: CLINIC | Age: 88
End: 2023-11-29
Payer: MEDICARE

## 2023-11-29 VITALS
HEART RATE: 81 BPM | HEIGHT: 67 IN | OXYGEN SATURATION: 99 % | SYSTOLIC BLOOD PRESSURE: 110 MMHG | BODY MASS INDEX: 25.06 KG/M2 | DIASTOLIC BLOOD PRESSURE: 64 MMHG

## 2023-11-29 DIAGNOSIS — E55.9 VITAMIN D DEFICIENCY: ICD-10-CM

## 2023-11-29 DIAGNOSIS — I10 ESSENTIAL HYPERTENSION: ICD-10-CM

## 2023-11-29 DIAGNOSIS — R21 RASH: ICD-10-CM

## 2023-11-29 DIAGNOSIS — M16.12 PRIMARY OSTEOARTHRITIS OF LEFT HIP: Primary | ICD-10-CM

## 2023-11-29 DIAGNOSIS — F03.90 DEMENTIA, UNSPECIFIED DEMENTIA SEVERITY, UNSPECIFIED DEMENTIA TYPE, UNSPECIFIED WHETHER BEHAVIORAL, PSYCHOTIC, OR MOOD DISTURBANCE OR ANXIETY: ICD-10-CM

## 2023-11-29 DIAGNOSIS — R73.09 ELEVATED GLUCOSE: ICD-10-CM

## 2023-11-29 DIAGNOSIS — M17.11 PRIMARY OSTEOARTHRITIS OF RIGHT KNEE: ICD-10-CM

## 2023-11-29 LAB
25(OH)D3+25(OH)D2 SERPL-MCNC: <4 NG/ML (ref 30–96)
ESTIMATED AVG GLUCOSE: 117 MG/DL (ref 68–131)
HBA1C MFR BLD: 5.7 % (ref 4–5.6)
TSH SERPL DL<=0.005 MIU/L-ACNC: 2.45 UIU/ML (ref 0.4–4)

## 2023-11-29 PROCEDURE — 1159F PR MEDICATION LIST DOCUMENTED IN MEDICAL RECORD: ICD-10-PCS | Mod: HCNC,CPTII,S$GLB, | Performed by: INTERNAL MEDICINE

## 2023-11-29 PROCEDURE — 84443 ASSAY THYROID STIM HORMONE: CPT | Mod: HCNC | Performed by: INTERNAL MEDICINE

## 2023-11-29 PROCEDURE — 1126F PR PAIN SEVERITY QUANTIFIED, NO PAIN PRESENT: ICD-10-PCS | Mod: HCNC,CPTII,S$GLB, | Performed by: INTERNAL MEDICINE

## 2023-11-29 PROCEDURE — 99999 PR PBB SHADOW E&M-EST. PATIENT-LVL III: ICD-10-PCS | Mod: PBBFAC,HCNC,, | Performed by: INTERNAL MEDICINE

## 2023-11-29 PROCEDURE — 1159F MED LIST DOCD IN RCRD: CPT | Mod: HCNC,CPTII,S$GLB, | Performed by: INTERNAL MEDICINE

## 2023-11-29 PROCEDURE — 99999 PR PBB SHADOW E&M-EST. PATIENT-LVL III: CPT | Mod: PBBFAC,HCNC,, | Performed by: INTERNAL MEDICINE

## 2023-11-29 PROCEDURE — 1101F PT FALLS ASSESS-DOCD LE1/YR: CPT | Mod: HCNC,CPTII,S$GLB, | Performed by: INTERNAL MEDICINE

## 2023-11-29 PROCEDURE — 3288F FALL RISK ASSESSMENT DOCD: CPT | Mod: HCNC,CPTII,S$GLB, | Performed by: INTERNAL MEDICINE

## 2023-11-29 PROCEDURE — 1101F PR PT FALLS ASSESS DOC 0-1 FALLS W/OUT INJ PAST YR: ICD-10-PCS | Mod: HCNC,CPTII,S$GLB, | Performed by: INTERNAL MEDICINE

## 2023-11-29 PROCEDURE — 82306 VITAMIN D 25 HYDROXY: CPT | Mod: HCNC | Performed by: INTERNAL MEDICINE

## 2023-11-29 PROCEDURE — 36415 COLL VENOUS BLD VENIPUNCTURE: CPT | Mod: HCNC | Performed by: INTERNAL MEDICINE

## 2023-11-29 PROCEDURE — 83036 HEMOGLOBIN GLYCOSYLATED A1C: CPT | Mod: HCNC | Performed by: INTERNAL MEDICINE

## 2023-11-29 PROCEDURE — 99215 OFFICE O/P EST HI 40 MIN: CPT | Mod: HCNC,S$GLB,, | Performed by: INTERNAL MEDICINE

## 2023-11-29 PROCEDURE — 1126F AMNT PAIN NOTED NONE PRSNT: CPT | Mod: HCNC,CPTII,S$GLB, | Performed by: INTERNAL MEDICINE

## 2023-11-29 PROCEDURE — 99215 PR OFFICE/OUTPT VISIT, EST, LEVL V, 40-54 MIN: ICD-10-PCS | Mod: HCNC,S$GLB,, | Performed by: INTERNAL MEDICINE

## 2023-11-29 PROCEDURE — 3288F PR FALLS RISK ASSESSMENT DOCUMENTED: ICD-10-PCS | Mod: HCNC,CPTII,S$GLB, | Performed by: INTERNAL MEDICINE

## 2023-11-29 RX ORDER — OLANZAPINE 2.5 MG/1
2.5 TABLET ORAL NIGHTLY
Qty: 30 TABLET | Refills: 0 | Status: SHIPPED | OUTPATIENT
Start: 2023-11-29 | End: 2023-12-29

## 2023-11-29 RX ORDER — TRIAMCINOLONE ACETONIDE 5 MG/G
CREAM TOPICAL 2 TIMES DAILY
Qty: 15 G | Refills: 1 | Status: SHIPPED | OUTPATIENT
Start: 2023-11-29

## 2023-11-30 NOTE — PROGRESS NOTES
CC:  ER follow-up     HPI:  The patient is a 89-year-old female with hypertension, dementia, hyperlipidemia, DJD of the left hip and DJD of the right knee who presents today as ER follow-up for fatigue.  She was brought to the emergency department for increased fatigue and worsening dementia.  Occasional nausea and vomiting was noted.  The workup in the emergency department was unremarkable.  The patient presents today with her daughter, granddaughter and close friend.  Her other daughter in [place] is present via Skype.  The patient has severe degenerative changes of the left hip.  She had a night knee replacement.  She has significant pain involving the right knee.  As result of her hip and knee, the patient does not ambulate.  Given her age, no surgical intervention would be safe at this time.    ROS:  The patient does eat 3 very small meals a day.  She might eat 2 or 3 bites then stop.  She has been losing weight.  She does not complain of any visual or auditory changes.  She does not complain of any chest pain or shortness of breath question.  She has about 1 or 2 bowel week.  Sometimes she can go longer between bowel movements.  Does have a rash involving her face.  Her daughter reports she becomes very agitated at night and tries to climb out of bed.  She also has some visual hallucinations.    Exam:  General appearance:  No acute distress  HEENT: Conjunctiva is clear.  She is bilateral lens replacements.  TMs are clear.  Nasal septum is midline without discharge.  Mucous membranes dry.  Trachea is midline without JVD.    Pulmonary:  Good inspiratory, expiratory breath sounds are heard.  Lungs are clear to auscultation.  Cardiovascular:  S1-S2, rhythm is regular.  Extremities without edema.    GI:  Patient had normoactive bowel sounds.  Comments:  Approximately 40 minutes was spent discussion with the patient and her family let her current condition.  Due to severe debility.  I recommended a hospital bed with a  pamela.  I also recommended a wheelchair.  Will consult palliative Care to assist in families needs.  Also discussed Zyprexa of the lowest dose at night    Assessment:  1. Dementia  2. Hypertension   3. Severe degenerative joint disease of the left hip  4. Right knee pain despite knee replacement  5. Elevated glucose review of labs  6. Rash on face    Plan:   1. Will check a TSH, vitamin-D and A1c today  2.  Will request a hospital bed as well as wheelchair  3. Will put a consult in to palliative care  4. Will prescribe Zyprexa 2.5 mg q.h.s. p.r.n.

## 2023-12-01 ENCOUNTER — TELEPHONE (OUTPATIENT)
Dept: INTERNAL MEDICINE | Facility: CLINIC | Age: 88
End: 2023-12-01
Payer: MEDICARE

## 2023-12-01 ENCOUNTER — PATIENT MESSAGE (OUTPATIENT)
Dept: INTERNAL MEDICINE | Facility: CLINIC | Age: 88
End: 2023-12-01
Payer: MEDICARE

## 2023-12-01 DIAGNOSIS — E55.9 VITAMIN D DEFICIENCY: Primary | ICD-10-CM

## 2023-12-01 RX ORDER — ERGOCALCIFEROL 1.25 MG/1
50000 CAPSULE ORAL
Qty: 12 CAPSULE | Refills: 1 | Status: ON HOLD | OUTPATIENT
Start: 2023-12-01 | End: 2023-12-13

## 2023-12-01 NOTE — TELEPHONE ENCOUNTER
----- Message from Vazquez Marsh MD sent at 12/1/2023  8:44 AM CST -----  Please contact patient's daughter ( patient with dementia). Her mother's vitamin D is very low. I called in a supplement that she is to take once a week. Please also see if they were able to get their hospital bed .

## 2023-12-08 ENCOUNTER — HOSPITAL ENCOUNTER (INPATIENT)
Facility: OTHER | Age: 88
LOS: 5 days | Discharge: HOSPICE | DRG: 682 | End: 2023-12-13
Attending: EMERGENCY MEDICINE | Admitting: EMERGENCY MEDICINE
Payer: MEDICARE

## 2023-12-08 DIAGNOSIS — N30.01 ACUTE CYSTITIS WITH HEMATURIA: Primary | ICD-10-CM

## 2023-12-08 DIAGNOSIS — E43 SEVERE MALNUTRITION: ICD-10-CM

## 2023-12-08 DIAGNOSIS — F03.90 DEMENTIA, UNSPECIFIED DEMENTIA SEVERITY, UNSPECIFIED DEMENTIA TYPE, UNSPECIFIED WHETHER BEHAVIORAL, PSYCHOTIC, OR MOOD DISTURBANCE OR ANXIETY: ICD-10-CM

## 2023-12-08 DIAGNOSIS — Z71.89 GOALS OF CARE, COUNSELING/DISCUSSION: ICD-10-CM

## 2023-12-08 DIAGNOSIS — N17.9 AKI (ACUTE KIDNEY INJURY): ICD-10-CM

## 2023-12-08 DIAGNOSIS — R06.02 SOB (SHORTNESS OF BREATH): ICD-10-CM

## 2023-12-08 DIAGNOSIS — R41.82 ALTERED MENTAL STATUS: ICD-10-CM

## 2023-12-08 PROBLEM — G93.49 INFECTIOUS ENCEPHALOPATHY: Status: ACTIVE | Noted: 2023-12-08

## 2023-12-08 PROBLEM — B99.9 INFECTIOUS ENCEPHALOPATHY: Status: ACTIVE | Noted: 2023-12-08

## 2023-12-08 PROBLEM — N39.0 UTI (URINARY TRACT INFECTION): Status: ACTIVE | Noted: 2023-12-08

## 2023-12-08 PROBLEM — N18.2 ACUTE RENAL FAILURE SUPERIMPOSED ON STAGE 2 CHRONIC KIDNEY DISEASE: Status: ACTIVE | Noted: 2023-12-08

## 2023-12-08 LAB
ALBUMIN SERPL BCP-MCNC: 3.2 G/DL (ref 3.5–5.2)
ALP SERPL-CCNC: 107 U/L (ref 55–135)
ALT SERPL W/O P-5'-P-CCNC: 25 U/L (ref 10–44)
ANION GAP SERPL CALC-SCNC: 17 MMOL/L (ref 8–16)
ANISOCYTOSIS BLD QL SMEAR: SLIGHT
AST SERPL-CCNC: 39 U/L (ref 10–40)
BACTERIA #/AREA URNS HPF: ABNORMAL /HPF
BASOPHILS # BLD AUTO: 0.06 K/UL (ref 0–0.2)
BASOPHILS NFR BLD: 0.2 % (ref 0–1.9)
BILIRUB SERPL-MCNC: 1.5 MG/DL (ref 0.1–1)
BILIRUB UR QL STRIP: ABNORMAL
BNP SERPL-MCNC: 216 PG/ML (ref 0–99)
BUN SERPL-MCNC: 47 MG/DL (ref 10–30)
CALCIUM SERPL-MCNC: 9.4 MG/DL (ref 8.7–10.5)
CHLORIDE SERPL-SCNC: 112 MMOL/L (ref 95–110)
CLARITY UR: ABNORMAL
CO2 SERPL-SCNC: 18 MMOL/L (ref 23–29)
COLOR UR: ABNORMAL
CREAT SERPL-MCNC: 2.6 MG/DL (ref 0.5–1.4)
CTP QC/QA: YES
CTP QC/QA: YES
DIFFERENTIAL METHOD: ABNORMAL
EOSINOPHIL # BLD AUTO: 0 K/UL (ref 0–0.5)
EOSINOPHIL NFR BLD: 0 % (ref 0–8)
ERYTHROCYTE [DISTWIDTH] IN BLOOD BY AUTOMATED COUNT: 17.7 % (ref 11.5–14.5)
EST. GFR  (NO RACE VARIABLE): 16 ML/MIN/1.73 M^2
GIANT PLATELETS BLD QL SMEAR: PRESENT
GLUCOSE SERPL-MCNC: 115 MG/DL (ref 70–110)
GLUCOSE UR QL STRIP: NEGATIVE
HCT VFR BLD AUTO: 45.8 % (ref 37–48.5)
HGB BLD-MCNC: 15.5 G/DL (ref 12–16)
HGB UR QL STRIP: ABNORMAL
HYALINE CASTS #/AREA URNS LPF: 2 /LPF
IMM GRANULOCYTES # BLD AUTO: 0.27 K/UL (ref 0–0.04)
IMM GRANULOCYTES NFR BLD AUTO: 0.9 % (ref 0–0.5)
KETONES UR QL STRIP: ABNORMAL
LDH SERPL L TO P-CCNC: 4.83 MMOL/L (ref 0.5–2.2)
LEUKOCYTE ESTERASE UR QL STRIP: ABNORMAL
LYMPHOCYTES # BLD AUTO: 1 K/UL (ref 1–4.8)
LYMPHOCYTES NFR BLD: 3.6 % (ref 18–48)
MAGNESIUM SERPL-MCNC: 2 MG/DL (ref 1.6–2.6)
MCH RBC QN AUTO: 28.8 PG (ref 27–31)
MCHC RBC AUTO-ENTMCNC: 33.8 G/DL (ref 32–36)
MCV RBC AUTO: 85 FL (ref 82–98)
MICROSCOPIC COMMENT: ABNORMAL
MONOCYTES # BLD AUTO: 1.4 K/UL (ref 0.3–1)
MONOCYTES NFR BLD: 4.7 % (ref 4–15)
NEUTROPHILS # BLD AUTO: 25.8 K/UL (ref 1.8–7.7)
NEUTROPHILS NFR BLD: 90.6 % (ref 38–73)
NITRITE UR QL STRIP: POSITIVE
NRBC BLD-RTO: 0 /100 WBC
OVALOCYTES BLD QL SMEAR: ABNORMAL
PH UR STRIP: 7 [PH] (ref 5–8)
PLATELET # BLD AUTO: 234 K/UL (ref 150–450)
PLATELET BLD QL SMEAR: ABNORMAL
PMV BLD AUTO: 10.7 FL (ref 9.2–12.9)
POC MOLECULAR INFLUENZA A AGN: NEGATIVE
POC MOLECULAR INFLUENZA B AGN: NEGATIVE
POIKILOCYTOSIS BLD QL SMEAR: SLIGHT
POTASSIUM SERPL-SCNC: 4.1 MMOL/L (ref 3.5–5.1)
PROT SERPL-MCNC: 6.9 G/DL (ref 6–8.4)
PROT UR QL STRIP: ABNORMAL
RBC # BLD AUTO: 5.39 M/UL (ref 4–5.4)
RBC #/AREA URNS HPF: >100 /HPF (ref 0–4)
SAMPLE: ABNORMAL
SARS-COV-2 RDRP RESP QL NAA+PROBE: NEGATIVE
SODIUM SERPL-SCNC: 147 MMOL/L (ref 136–145)
SP GR UR STRIP: 1.02 (ref 1–1.03)
SQUAMOUS #/AREA URNS HPF: 3 /HPF
URN SPEC COLLECT METH UR: ABNORMAL
UROBILINOGEN UR STRIP-ACNC: ABNORMAL EU/DL
WBC # BLD AUTO: 28.45 K/UL (ref 3.9–12.7)
WBC #/AREA URNS HPF: >100 /HPF (ref 0–5)
WBC CLUMPS URNS QL MICRO: ABNORMAL

## 2023-12-08 PROCEDURE — 87635 SARS-COV-2 COVID-19 AMP PRB: CPT | Mod: HCNC | Performed by: EMERGENCY MEDICINE

## 2023-12-08 PROCEDURE — 93005 ELECTROCARDIOGRAM TRACING: CPT

## 2023-12-08 PROCEDURE — 87040 BLOOD CULTURE FOR BACTERIA: CPT | Mod: 59 | Performed by: EMERGENCY MEDICINE

## 2023-12-08 PROCEDURE — 83605 ASSAY OF LACTIC ACID: CPT | Mod: HCNC

## 2023-12-08 PROCEDURE — 85025 COMPLETE CBC W/AUTO DIFF WBC: CPT | Performed by: EMERGENCY MEDICINE

## 2023-12-08 PROCEDURE — 93010 ELECTROCARDIOGRAM REPORT: CPT | Mod: ,,, | Performed by: INTERNAL MEDICINE

## 2023-12-08 PROCEDURE — A4216 STERILE WATER/SALINE, 10 ML: HCPCS | Mod: HCNC | Performed by: PHYSICIAN ASSISTANT

## 2023-12-08 PROCEDURE — 25000003 PHARM REV CODE 250: Mod: HCNC | Performed by: PHYSICIAN ASSISTANT

## 2023-12-08 PROCEDURE — 80053 COMPREHEN METABOLIC PANEL: CPT | Performed by: EMERGENCY MEDICINE

## 2023-12-08 PROCEDURE — 99900035 HC TECH TIME PER 15 MIN (STAT): Mod: HCNC

## 2023-12-08 PROCEDURE — 25000003 PHARM REV CODE 250: Mod: HCNC | Performed by: EMERGENCY MEDICINE

## 2023-12-08 PROCEDURE — 36415 COLL VENOUS BLD VENIPUNCTURE: CPT | Performed by: EMERGENCY MEDICINE

## 2023-12-08 PROCEDURE — 83880 ASSAY OF NATRIURETIC PEPTIDE: CPT | Performed by: EMERGENCY MEDICINE

## 2023-12-08 PROCEDURE — 99285 EMERGENCY DEPT VISIT HI MDM: CPT | Mod: 25

## 2023-12-08 PROCEDURE — 63600175 PHARM REV CODE 636 W HCPCS: Mod: HCNC | Performed by: EMERGENCY MEDICINE

## 2023-12-08 PROCEDURE — 27000221 HC OXYGEN, UP TO 24 HOURS: Mod: HCNC

## 2023-12-08 PROCEDURE — 96374 THER/PROPH/DIAG INJ IV PUSH: CPT

## 2023-12-08 PROCEDURE — 21400001 HC TELEMETRY ROOM: Mod: HCNC

## 2023-12-08 PROCEDURE — 81000 URINALYSIS NONAUTO W/SCOPE: CPT | Performed by: EMERGENCY MEDICINE

## 2023-12-08 PROCEDURE — 83735 ASSAY OF MAGNESIUM: CPT | Performed by: EMERGENCY MEDICINE

## 2023-12-08 PROCEDURE — 93010 EKG 12-LEAD: ICD-10-PCS | Mod: ,,, | Performed by: INTERNAL MEDICINE

## 2023-12-08 RX ORDER — SODIUM CHLORIDE 9 MG/ML
INJECTION, SOLUTION INTRAVENOUS CONTINUOUS
Status: DISCONTINUED | OUTPATIENT
Start: 2023-12-08 | End: 2023-12-09

## 2023-12-08 RX ORDER — HEPARIN SODIUM 5000 [USP'U]/ML
5000 INJECTION, SOLUTION INTRAVENOUS; SUBCUTANEOUS EVERY 8 HOURS
Status: DISCONTINUED | OUTPATIENT
Start: 2023-12-09 | End: 2023-12-13 | Stop reason: HOSPADM

## 2023-12-08 RX ORDER — SODIUM CHLORIDE 9 MG/ML
500 INJECTION, SOLUTION INTRAVENOUS
Status: COMPLETED | OUTPATIENT
Start: 2023-12-08 | End: 2023-12-08

## 2023-12-08 RX ORDER — ACETAMINOPHEN 325 MG/1
650 TABLET ORAL EVERY 6 HOURS PRN
Status: DISCONTINUED | OUTPATIENT
Start: 2023-12-08 | End: 2023-12-13 | Stop reason: HOSPADM

## 2023-12-08 RX ORDER — SODIUM CHLORIDE 0.9 % (FLUSH) 0.9 %
10 SYRINGE (ML) INJECTION
Status: DISCONTINUED | OUTPATIENT
Start: 2023-12-08 | End: 2023-12-13 | Stop reason: HOSPADM

## 2023-12-08 RX ORDER — NALOXONE HCL 0.4 MG/ML
0.02 VIAL (ML) INJECTION
Status: DISCONTINUED | OUTPATIENT
Start: 2023-12-08 | End: 2023-12-13 | Stop reason: HOSPADM

## 2023-12-08 RX ORDER — LORAZEPAM 2 MG/ML
0.5 INJECTION INTRAMUSCULAR
Status: COMPLETED | OUTPATIENT
Start: 2023-12-08 | End: 2023-12-08

## 2023-12-08 RX ORDER — OLANZAPINE 2.5 MG/1
2.5 TABLET ORAL NIGHTLY
Status: DISCONTINUED | OUTPATIENT
Start: 2023-12-08 | End: 2023-12-13 | Stop reason: HOSPADM

## 2023-12-08 RX ORDER — AMLODIPINE BESYLATE 5 MG/1
5 TABLET ORAL 2 TIMES DAILY
Status: DISCONTINUED | OUTPATIENT
Start: 2023-12-08 | End: 2023-12-13 | Stop reason: HOSPADM

## 2023-12-08 RX ORDER — AMOXICILLIN 250 MG
1 CAPSULE ORAL 2 TIMES DAILY PRN
Status: DISCONTINUED | OUTPATIENT
Start: 2023-12-08 | End: 2023-12-13 | Stop reason: HOSPADM

## 2023-12-08 RX ORDER — TALC
6 POWDER (GRAM) TOPICAL NIGHTLY PRN
Status: DISCONTINUED | OUTPATIENT
Start: 2023-12-08 | End: 2023-12-13 | Stop reason: HOSPADM

## 2023-12-08 RX ORDER — SODIUM CHLORIDE 0.9 % (FLUSH) 0.9 %
10 SYRINGE (ML) INJECTION EVERY 8 HOURS
Status: DISCONTINUED | OUTPATIENT
Start: 2023-12-08 | End: 2023-12-13 | Stop reason: HOSPADM

## 2023-12-08 RX ADMIN — Medication 10 ML: at 11:12

## 2023-12-08 RX ADMIN — SODIUM CHLORIDE: 9 INJECTION, SOLUTION INTRAVENOUS at 11:12

## 2023-12-08 RX ADMIN — SODIUM CHLORIDE 500 ML: 9 INJECTION, SOLUTION INTRAVENOUS at 08:12

## 2023-12-08 RX ADMIN — CEFTRIAXONE SODIUM 1 G: 1 INJECTION, POWDER, FOR SOLUTION INTRAMUSCULAR; INTRAVENOUS at 08:12

## 2023-12-08 RX ADMIN — LORAZEPAM 0.5 MG: 2 INJECTION INTRAMUSCULAR; INTRAVENOUS at 07:12

## 2023-12-08 RX ADMIN — LORAZEPAM 0.5 MG: 2 INJECTION INTRAMUSCULAR; INTRAVENOUS at 08:12

## 2023-12-09 PROBLEM — E43 SEVERE MALNUTRITION: Status: ACTIVE | Noted: 2023-12-09

## 2023-12-09 LAB
ANION GAP SERPL CALC-SCNC: 15 MMOL/L (ref 8–16)
ANION GAP SERPL CALC-SCNC: 18 MMOL/L (ref 8–16)
ANION GAP SERPL CALC-SCNC: 20 MMOL/L (ref 8–16)
ANISOCYTOSIS BLD QL SMEAR: SLIGHT
BASOPHILS # BLD AUTO: 0.04 K/UL (ref 0–0.2)
BASOPHILS NFR BLD: 0.2 % (ref 0–1.9)
BUN SERPL-MCNC: 51 MG/DL (ref 10–30)
BUN SERPL-MCNC: 51 MG/DL (ref 10–30)
BUN SERPL-MCNC: 54 MG/DL (ref 10–30)
BURR CELLS BLD QL SMEAR: ABNORMAL
CALCIUM SERPL-MCNC: 8.9 MG/DL (ref 8.7–10.5)
CHLORIDE SERPL-SCNC: 114 MMOL/L (ref 95–110)
CHLORIDE SERPL-SCNC: 116 MMOL/L (ref 95–110)
CHLORIDE SERPL-SCNC: 116 MMOL/L (ref 95–110)
CO2 SERPL-SCNC: 15 MMOL/L (ref 23–29)
CO2 SERPL-SCNC: 16 MMOL/L (ref 23–29)
CO2 SERPL-SCNC: 17 MMOL/L (ref 23–29)
CREAT SERPL-MCNC: 2.8 MG/DL (ref 0.5–1.4)
CREAT SERPL-MCNC: 2.9 MG/DL (ref 0.5–1.4)
CREAT SERPL-MCNC: 2.9 MG/DL (ref 0.5–1.4)
CREAT UR-MCNC: 164.7 MG/DL (ref 15–325)
CREAT UR-MCNC: 164.7 MG/DL (ref 15–325)
DIFFERENTIAL METHOD: ABNORMAL
EOSINOPHIL # BLD AUTO: 0.2 K/UL (ref 0–0.5)
EOSINOPHIL NFR BLD: 0.8 % (ref 0–8)
ERYTHROCYTE [DISTWIDTH] IN BLOOD BY AUTOMATED COUNT: 17.7 % (ref 11.5–14.5)
EST. GFR  (NO RACE VARIABLE): 14 ML/MIN/1.73 M^2
EST. GFR  (NO RACE VARIABLE): 14 ML/MIN/1.73 M^2
EST. GFR  (NO RACE VARIABLE): 15 ML/MIN/1.73 M^2
GLUCOSE SERPL-MCNC: 125 MG/DL (ref 70–110)
GLUCOSE SERPL-MCNC: 130 MG/DL (ref 70–110)
GLUCOSE SERPL-MCNC: 95 MG/DL (ref 70–110)
HCT VFR BLD AUTO: 43 % (ref 37–48.5)
HGB BLD-MCNC: 14.2 G/DL (ref 12–16)
IMM GRANULOCYTES # BLD AUTO: 0.24 K/UL (ref 0–0.04)
IMM GRANULOCYTES NFR BLD AUTO: 1 % (ref 0–0.5)
LYMPHOCYTES # BLD AUTO: 1 K/UL (ref 1–4.8)
LYMPHOCYTES NFR BLD: 4 % (ref 18–48)
MAGNESIUM SERPL-MCNC: 1.9 MG/DL (ref 1.6–2.6)
MAGNESIUM SERPL-MCNC: 2 MG/DL (ref 1.6–2.6)
MCH RBC QN AUTO: 28.4 PG (ref 27–31)
MCHC RBC AUTO-ENTMCNC: 33 G/DL (ref 32–36)
MCV RBC AUTO: 86 FL (ref 82–98)
MONOCYTES # BLD AUTO: 1.2 K/UL (ref 0.3–1)
MONOCYTES NFR BLD: 5.1 % (ref 4–15)
NEUTROPHILS # BLD AUTO: 21.1 K/UL (ref 1.8–7.7)
NEUTROPHILS NFR BLD: 88.9 % (ref 38–73)
NRBC BLD-RTO: 0 /100 WBC
OSMOLALITY UR: 420 MOSM/KG (ref 50–1200)
OVALOCYTES BLD QL SMEAR: ABNORMAL
PHOSPHATE SERPL-MCNC: 2.8 MG/DL (ref 2.7–4.5)
PLATELET # BLD AUTO: 237 K/UL (ref 150–450)
PLATELET BLD QL SMEAR: ABNORMAL
PMV BLD AUTO: 11.6 FL (ref 9.2–12.9)
POIKILOCYTOSIS BLD QL SMEAR: SLIGHT
POTASSIUM SERPL-SCNC: 3.7 MMOL/L (ref 3.5–5.1)
POTASSIUM SERPL-SCNC: 3.9 MMOL/L (ref 3.5–5.1)
POTASSIUM SERPL-SCNC: 4.1 MMOL/L (ref 3.5–5.1)
PROT UR-MCNC: 149 MG/DL (ref 0–15)
PROT/CREAT UR: 0.9 MG/G{CREAT} (ref 0–0.2)
RBC # BLD AUTO: 5 M/UL (ref 4–5.4)
SODIUM SERPL-SCNC: 146 MMOL/L (ref 136–145)
SODIUM SERPL-SCNC: 150 MMOL/L (ref 136–145)
SODIUM SERPL-SCNC: 151 MMOL/L (ref 136–145)
SODIUM UR-SCNC: 46 MMOL/L (ref 20–250)
WBC # BLD AUTO: 23.77 K/UL (ref 3.9–12.7)

## 2023-12-09 PROCEDURE — 83735 ASSAY OF MAGNESIUM: CPT | Mod: 91 | Performed by: HOSPITALIST

## 2023-12-09 PROCEDURE — 94761 N-INVAS EAR/PLS OXIMETRY MLT: CPT

## 2023-12-09 PROCEDURE — 21400001 HC TELEMETRY ROOM

## 2023-12-09 PROCEDURE — 36415 COLL VENOUS BLD VENIPUNCTURE: CPT | Performed by: PHYSICIAN ASSISTANT

## 2023-12-09 PROCEDURE — 85025 COMPLETE CBC W/AUTO DIFF WBC: CPT | Performed by: PHYSICIAN ASSISTANT

## 2023-12-09 PROCEDURE — 84156 ASSAY OF PROTEIN URINE: CPT | Performed by: PHYSICIAN ASSISTANT

## 2023-12-09 PROCEDURE — 25000003 PHARM REV CODE 250: Performed by: HOSPITALIST

## 2023-12-09 PROCEDURE — 80048 BASIC METABOLIC PNL TOTAL CA: CPT | Performed by: PHYSICIAN ASSISTANT

## 2023-12-09 PROCEDURE — A4216 STERILE WATER/SALINE, 10 ML: HCPCS | Performed by: PHYSICIAN ASSISTANT

## 2023-12-09 PROCEDURE — 84300 ASSAY OF URINE SODIUM: CPT | Performed by: PHYSICIAN ASSISTANT

## 2023-12-09 PROCEDURE — 36415 COLL VENOUS BLD VENIPUNCTURE: CPT | Performed by: HOSPITALIST

## 2023-12-09 PROCEDURE — 51798 US URINE CAPACITY MEASURE: CPT

## 2023-12-09 PROCEDURE — 63600175 PHARM REV CODE 636 W HCPCS: Mod: HCNC | Performed by: PHYSICIAN ASSISTANT

## 2023-12-09 PROCEDURE — 25000003 PHARM REV CODE 250: Performed by: PHYSICIAN ASSISTANT

## 2023-12-09 PROCEDURE — 83735 ASSAY OF MAGNESIUM: CPT | Performed by: PHYSICIAN ASSISTANT

## 2023-12-09 PROCEDURE — 80048 BASIC METABOLIC PNL TOTAL CA: CPT | Mod: 91 | Performed by: HOSPITALIST

## 2023-12-09 PROCEDURE — 84100 ASSAY OF PHOSPHORUS: CPT | Performed by: HOSPITALIST

## 2023-12-09 PROCEDURE — 83935 ASSAY OF URINE OSMOLALITY: CPT | Performed by: PHYSICIAN ASSISTANT

## 2023-12-09 PROCEDURE — 92610 EVALUATE SWALLOWING FUNCTION: CPT

## 2023-12-09 RX ORDER — DEXTROSE MONOHYDRATE 50 MG/ML
INJECTION, SOLUTION INTRAVENOUS CONTINUOUS
Status: DISCONTINUED | OUTPATIENT
Start: 2023-12-09 | End: 2023-12-10

## 2023-12-09 RX ORDER — DEXTROSE MONOHYDRATE 50 MG/ML
INJECTION, SOLUTION INTRAVENOUS CONTINUOUS
Status: DISCONTINUED | OUTPATIENT
Start: 2023-12-09 | End: 2023-12-09

## 2023-12-09 RX ADMIN — DEXTROSE MONOHYDRATE: 50 INJECTION, SOLUTION INTRAVENOUS at 07:12

## 2023-12-09 RX ADMIN — DEXTROSE MONOHYDRATE: 50 INJECTION, SOLUTION INTRAVENOUS at 09:12

## 2023-12-09 RX ADMIN — CEFTRIAXONE SODIUM 1 G: 1 INJECTION, POWDER, FOR SOLUTION INTRAMUSCULAR; INTRAVENOUS at 09:12

## 2023-12-09 RX ADMIN — Medication 10 ML: at 06:12

## 2023-12-09 RX ADMIN — HEPARIN SODIUM 5000 UNITS: 5000 INJECTION INTRAVENOUS; SUBCUTANEOUS at 01:12

## 2023-12-09 RX ADMIN — Medication 10 ML: at 09:12

## 2023-12-09 RX ADMIN — HEPARIN SODIUM 5000 UNITS: 5000 INJECTION INTRAVENOUS; SUBCUTANEOUS at 11:12

## 2023-12-09 RX ADMIN — Medication 10 ML: at 02:12

## 2023-12-09 RX ADMIN — HEPARIN SODIUM 5000 UNITS: 5000 INJECTION INTRAVENOUS; SUBCUTANEOUS at 07:12

## 2023-12-09 NOTE — PLAN OF CARE
Problem: SLP  Goal: SLP Goal  Description: 1. Pt will be able to sustain wakefulness for 3-5 min  2. Pt will be able to sustain attention to a simple, familiar task for 5 min  3. Pt will be able to consume thin liquids from a spoon with no signs of airway threat or aspiration given max assistance  Outcome: Ongoing, Progressing  Oral and pharyngeal swallow evaluation initiated. Pt referred to speech pathology due to altered mental status and vomiting with eating. Pt was not able to safely or actively participate in swallow evaluation this date. Dcr level of alertness, unsafe to offer any foods or liquids by mouth.

## 2023-12-09 NOTE — PLAN OF CARE
Pt remained free of falls and injuries throughout shift. Pt disoriented X4 and lethargic after receiving Ativan in the ED prior to transfer to unit. Pt gets agitated and restless when receiving care, care clustered and relaxation promoted. Purposeful hourly rounding performed. NS infusing per MAR. Pt bedfast, weight shift assistance provided. Pure wick in place to suction, pt has not voided this shift, bladder scan shows 5-15 mL. VSS on 2 L O2 NC. Pt resting in bed, daughters at bedside. Bed low and locked, bed alarm on, call light in reach. Side rails up x3. Report given to HILLARY Locke.

## 2023-12-09 NOTE — H&P
Advance Care Planning     Date: 12/09/2023    Code Status  Advanced directive addressed with daughter at the bedside given patient's advanced dementia and inability to participate.  Patient is a full code.      Will place consult to Palliative Care for Monday to further address goals of care and to provide education and support for the family.    I spent a total of >16 minutes engaging the patient in this advance care planning discussion.

## 2023-12-09 NOTE — ED PROVIDER NOTES
"Encounter Date: 12/8/2023    SCRIBE #1 NOTE: I, Bry German, am scribing for, and in the presence of,  Deana Jasmine MD. I have scribed the following portions of the note - Other sections scribed: HPI, ROS, PE.       History     Chief Complaint   Patient presents with    Altered Mental Status     Family activated EMS for pt more confused than baseline, hx of dementia. Pt initial O2 sat 80% on RA, currently in Afib , with initial SBP 80     Time seen by physician: 7:25 PM    This is a 89 y.o. female with dementia who presents via EMS with her daughter who states that the patient has been agitated and more confused for the last nine hours.  At baseline, she is able to speak though confused.  However today she is just mumbling nonsensical words and has been very agitated.  She was also "tossing and turning in bed" and had her "legs up on the wall like she was trying to walk on the wall."  Pt has also been having increased work of breathing for the past couple days with decreased p.o. intake.  Upon EMS arrival, patient was noted to have a low oxygen saturation.  She has been unable to tolerate p.o. with regurgitation after any intake.  Denies any vomiting, denies any cough, denies any fevers/chills.          The history is provided by the patient and a relative. The history is limited by the condition of the patient.     Review of patient's allergies indicates:   Allergen Reactions    Lotensin [benazepril] Edema    Celexa [citalopram] Other (See Comments)     Hot flashes      Past Medical History:   Diagnosis Date    Anxiety     Arthritis     Cataract     Congestive heart failure     Dementia     Dry eye syndrome     Dry senile macular degeneration     Hypertension     Joint pain     Posterior capsular opacification, both eyes     Psychiatric problem     PVD (peripheral vascular disease)      Past Surgical History:   Procedure Laterality Date    CATARACT EXTRACTION      both eyes    HYSTERECTOMY      TOTAL KNEE " ARTHROPLASTY      right     Family History   Problem Relation Age of Onset    Glaucoma Mother     Cancer Sister     Cancer Brother     Cancer Sister     Cancer Sister     Diabetes Son     Cataracts Son     Melanoma Neg Hx     Alcohol abuse Neg Hx     Anxiety disorder Neg Hx     Dementia Neg Hx     Depression Neg Hx     Schizophrenia Neg Hx     Suicide Neg Hx     Drug abuse Neg Hx      Social History     Tobacco Use    Smoking status: Former     Current packs/day: 0.00     Types: Cigarettes     Quit date: 2005     Years since quittin.6    Smokeless tobacco: Never   Substance Use Topics    Alcohol use: No    Drug use: No     Review of Systems   Unable to perform ROS: Dementia   Constitutional:  Positive for appetite change.   Respiratory:  Positive for shortness of breath. Negative for cough.    Gastrointestinal:  Positive for vomiting.   Psychiatric/Behavioral:  Positive for agitation and confusion.        Physical Exam     Initial Vitals [23 1815]   BP Pulse Resp Temp SpO2   110/70 99 20 98 °F (36.7 °C) 100 %      MAP       --         Physical Exam    Nursing note and vitals reviewed.  Constitutional: She appears well-developed and well-nourished.   Slightly agitated   HENT:   Head: Normocephalic and atraumatic.   Dry mucous membranes   Eyes: Conjunctivae are normal.   Neck: Neck supple.   Normal range of motion.  Cardiovascular:  Normal heart sounds.           Tachycardia.  Irregularly irregular rhythm   Pulmonary/Chest: No respiratory distress. She has no wheezes. She has rhonchi. She has no rales.   Rhonchi at bases bilaterally.   Abdominal: Abdomen is soft. Bowel sounds are normal. She exhibits no distension. There is no abdominal tenderness. There is no rebound.   Musculoskeletal:         General: Normal range of motion.      Cervical back: Normal range of motion and neck supple.     Neurological: She is alert.   Skin: Skin is warm and dry. Capillary refill takes less than 2 seconds.    Psychiatric:   Alert. Not oriented. Agitated. Non-verbal.         ED Course   Procedures  Labs Reviewed   COMPREHENSIVE METABOLIC PANEL - Abnormal; Notable for the following components:       Result Value    Sodium 147 (*)     Chloride 112 (*)     CO2 18 (*)     Glucose 115 (*)     BUN 47 (*)     Creatinine 2.6 (*)     Albumin 3.2 (*)     Total Bilirubin 1.5 (*)     eGFR 16 (*)     Anion Gap 17 (*)     All other components within normal limits   CBC W/ AUTO DIFFERENTIAL - Abnormal; Notable for the following components:    WBC 28.45 (*)     RDW 17.7 (*)     Immature Granulocytes 0.9 (*)     Gran # (ANC) 25.8 (*)     Immature Grans (Abs) 0.27 (*)     Mono # 1.4 (*)     Gran % 90.6 (*)     Lymph % 3.6 (*)     All other components within normal limits   URINALYSIS - Abnormal; Notable for the following components:    Color, UA Brown (*)     Appearance, UA Cloudy (*)     Protein, UA 3+ (*)     Ketones, UA 1+ (*)     Bilirubin (UA) 2+ (*)     Occult Blood UA 3+ (*)     Nitrite, UA Positive (*)     Urobilinogen, UA 4.0-6.0 (*)     Leukocytes, UA 3+ (*)     All other components within normal limits   URINALYSIS MICROSCOPIC - Abnormal; Notable for the following components:    RBC, UA >100 (*)     WBC, UA >100 (*)     WBC Clumps, UA Few (*)     Bacteria Many (*)     Hyaline Casts, UA 2 (*)     All other components within normal limits   CULTURE, BLOOD   CULTURE, BLOOD   MAGNESIUM   SARS-COV-2 RDRP GENE   POCT INFLUENZA A/B MOLECULAR     EKG Readings: (Independently Interpreted)   6:33PM:  Rate of 91.  Irregularly irregular rhythm.  Left axis deviation.  Normal intervals.  No ST or ischemic changes.       Imaging Results              X-Ray Chest AP Portable (Final result)  Result time 12/08/23 19:58:44      Final result by Eleni Triplett MD (12/08/23 19:58:44)                   Impression:      No acute intrathoracic abnormality detected.      Electronically signed by: Eleni  Uziel  Date:    12/08/2023  Time:    19:58               Narrative:    EXAMINATION:  AP PORTABLE CHEST    CLINICAL HISTORY:  Shortness of breath    TECHNIQUE:  AP portable chest radiograph was submitted.    COMPARISON:  11/26/2023    FINDINGS:  AP portable chest radiograph demonstrates a cardiac silhouette within normal limits.  There is tortuosity and ectasia of the descending thoracic aorta.  There is no focal consolidation, pneumothorax, or pleural effusion.                                    X-Rays:   Independently Interpreted Readings:   Chest X-Ray: Trachea midline.  No cardiomegaly.  No effusion, infiltrate, edema.     Medications   cefTRIAXone (ROCEPHIN) 1 g in dextrose 5 % in water (D5W) 100 mL IVPB (MB+) (1 g Intravenous New Bag 12/8/23 2056)   LORazepam injection 0.5 mg (0.5 mg Intravenous Given 12/8/23 1938)   0.9%  NaCl infusion (0 mLs Intravenous Stopped 12/8/23 2055)   LORazepam injection 0.5 mg (0.5 mg Intravenous Given 12/8/23 2036)     Medical Decision Making  7:25PM:  Patient is a 89-year-old female who presents to the emergency department with increased agitation and change in mental status.  She does appear agitated, dry, and also tachycardic.  Will plan for labs, UA, sepsis workup, will continue to follow and reassess.    Differential diagnosis: Sepsis, dehydration, ARGENIS, electrolyte abnormalities,    Amount and/or Complexity of Data Reviewed  Independent Historian: caregiver and EMS     Details: Daughter  External Data Reviewed: notes.  Labs: ordered. Decision-making details documented in ED Course.  Radiology: ordered and independent interpretation performed. Decision-making details documented in ED Course.  ECG/medicine tests: ordered and independent interpretation performed. Decision-making details documented in ED Course.  Discussion of management or test interpretation with external provider(s): Hospital medicine per [facility]    Risk  Prescription drug management.  Decision regarding  hospitalization.      8:35PM:  Patient has a significantly elevated white count of 28k with evidence of a UTI on her urinalysis.  She also is very dehydrated with a sodium of 147 and an ARGENIS with a creatinine of 2.6 when it was normal within the past couple weeks.  Will plan for antibiotics, fluids, along with admission for Hospital Medicine.  I updated the family regarding the results along with plan for admission.  Agreeable to plan and all questions answered.    8:41 PM:  I discussed the patient with Hospital Medicine, who is agreeable to plan for admission under Dr. Blackmon.        Scribe Attestation:   Scribe #1: I performed the above scribed service and the documentation accurately describes the services I performed. I attest to the accuracy of the note.    Physician Attestation for Scribe: I, Deana Jasmine, reviewed documentation as scribed in my presence, which is both accurate and complete.                 Medical Decision Making:   History:   Old Medical Records: I decided to obtain old medical records.  Clinical Tests:   Lab Tests: Ordered and Reviewed  Radiological Study: Ordered and Reviewed  Medical Tests: Ordered and Reviewed                 Clinical Impression:  Final diagnoses:  [R41.82] Altered mental status  [R06.02] SOB (shortness of breath)  [N17.9] ARGENIS (acute kidney injury) (Primary)  [N30.01] Acute cystitis with hematuria          ED Disposition Condition    Admit Stable                Deana Jasmine MD  12/08/23 1768

## 2023-12-09 NOTE — ED NOTES
Dalila Sherwoods, an 89 y.o. female presents to the ED with worsening confusion, hypoxia, decreased appetite.       Chief Complaint   Patient presents with    Altered Mental Status     Family activated EMS for pt more confused than baseline, hx of dementia. Pt initial O2 sat 80% on RA, currently in Afib , with initial SBP 80     Review of patient's allergies indicates:   Allergen Reactions    Lotensin [benazepril] Edema    Celexa [citalopram] Other (See Comments)     Hot flashes      Past Medical History:   Diagnosis Date    Anxiety     Arthritis     Cataract     Congestive heart failure     Dementia     Dry eye syndrome     Dry senile macular degeneration     Hypertension     Joint pain     Posterior capsular opacification, both eyes     Psychiatric problem     PVD (peripheral vascular disease)

## 2023-12-09 NOTE — PLAN OF CARE
POC reviewed with patient family members at bedside.Patient nonverbal and is closing her eyes all day so unable to assess orientation. Stable on 2 Lit NC. No any complaints verbalized during shift. IV fluid cont. Pure wick utilized for voiding. Positions x 2 assist. Turn Q2/frequent weight shift encouraged by RN. Low air loss mattress in use. Family at bedside.No injuries, falls, or trauma occurred during shift. Purposeful rounding completed. Bed low and locked with side rails up x 3 and call light within reach.   Problem: Adult Inpatient Plan of Care  Goal: Plan of Care Review  Outcome: Ongoing, Progressing  Goal: Patient-Specific Goal (Individualized)  Outcome: Ongoing, Progressing  Goal: Absence of Hospital-Acquired Illness or Injury  Outcome: Ongoing, Progressing  Goal: Optimal Comfort and Wellbeing  Outcome: Ongoing, Progressing  Goal: Readiness for Transition of Care  Outcome: Ongoing, Progressing     Problem: Fluid and Electrolyte Imbalance (Acute Kidney Injury/Impairment)  Goal: Fluid and Electrolyte Balance  Outcome: Ongoing, Progressing     Problem: Oral Intake Inadequate (Acute Kidney Injury/Impairment)  Goal: Optimal Nutrition Intake  Outcome: Ongoing, Progressing     Problem: Renal Function Impairment (Acute Kidney Injury/Impairment)  Goal: Effective Renal Function  Outcome: Ongoing, Progressing     Problem: Skin Injury Risk Increased  Goal: Skin Health and Integrity  Outcome: Ongoing, Progressing     Problem: Fall Injury Risk  Goal: Absence of Fall and Fall-Related Injury  Outcome: Ongoing, Progressing

## 2023-12-09 NOTE — CONSULTS
[facility] - [facility] ([place])  Adult Nutrition  Consult Note    SUMMARY     Recommendations    1. Advance oral diet per SLP recommendations  2. Recommend commercial beverages once NPO status advances  3. Recommend to consider appetite stimulant if appropriate  4. Monitor labs and weights  5. Collaboration with medical providers    Goals: Patient to advance with nutrition therapy prior to RD follow up.  Nutrition Goal Status: new  Communication of RD Recs: other (comment) (Consult, poc)    Assessment and Plan    Endocrine  Severe malnutrition  Malnutrition Type:  Context: acute illness or injury  Level: severe    Related to (etiology):   Decreased appetite   AMS, dementia   CKD  Infection    Signs and Symptoms (as evidenced by):   Decreased po intake   Weight loss     Malnutrition Characteristic Summary:  Weight Loss (Malnutrition): greater than 5% in 1 month  Energy Intake (Malnutrition): less than or equal to 75% for greater than or equal to 1 month      Interventions (treatment strategy):  1. Commercial beverages 2. Collaboration with medical providers    Nutrition Diagnosis Status:   New         Malnutrition Assessment  Malnutrition Context: acute illness or injury  Malnutrition Level: severe      Micronutrient Evaluation Summary: suspected deficiency   Weight Loss (Malnutrition): greater than 5% in 1 month  Energy Intake (Malnutrition): less than or equal to 75% for greater than or equal to 1 month                         Reason for Assessment    Reason For Assessment: consult, identified at risk by screening criteria    Diagnosis: infection/sepsis  Patient Active Problem List   Diagnosis    Congestive heart failure    Essential hypertension    Constipation    Dizziness    Atherosclerosis of aorta    Atrial fibrillation    Peripheral edema    UTI (urinary tract infection)    Acute renal failure superimposed on stage 2 chronic kidney disease    Infectious encephalopathy    Dementia    Severe malnutrition  "      Relevant Medical History:   Past Medical History:   Diagnosis Date    Anxiety     Arthritis     Cataract     Congestive heart failure     Dementia     Dry eye syndrome     Dry senile macular degeneration     Hypertension     Joint pain     Posterior capsular opacification, both eyes     Psychiatric problem     PVD (peripheral vascular disease)        Interdisciplinary Rounds: did not attend (RD remote)    General Information Comments:   2023: Patient is currently npo.  Patient admitted with AMS and decreased po intake.  RD consulted for swallowing difficulty.  Recommends from SLP evaluation not available at this time.  Medical chart weight history show weight loss from 72.6kg within 1 month (significant)  Patient is positive for malnutrition due to reduced po intake and weight loss.  RD to continue to monitor for SLP evalution and recommendations.  Commercial beverages and appetite stimulant recommended once NPO status advances.  Labs reviewed.  NKFA.  LBM: [place].  Patient with advanced age of 89yo.  RD to continue to monitor and follow.    Nutrition Discharge Planning: Pending medical course    Nutrition Risk Screen    Nutrition Risk Screen: reduced oral intake over the last month    Nutrition/Diet History    Spiritual, Cultural Beliefs, Taoist Practices, Values that Affect Care: no  Food Allergies: NKFA  Factors Affecting Nutritional Intake: NPO, painful swallowing    Anthropometrics    Temp: 98.3 °F (36.8 °C)  Height Method: Stated  Height: 5' 7" (170.2 cm)  Height (inches): 67 in  Weight Method: Bed Scale  Weight: 64.4 kg (141 lb 15.6 oz)  Weight (lb): 141.98 lb  Ideal Body Weight (IBW), Female: 135 lb  % Ideal Body Weight, Female (lb): 105.17 %  BMI (Calculated): 22.2  BMI Grade: 18.5-24.9 - normal  Weight Loss: unintentional  Usual Body Weight (UBW), k.6 kg (within 1 month 2023)  % Usual Body Weight: 88.89  % Weight Change From Usual Weight: -11.3 %       Lab/Procedures/Meds    Pertinent " Labs Reviewed: reviewed  BMP  Lab Results   Component Value Date     (H) 12/09/2023    K 3.7 12/09/2023     (H) 12/09/2023    CO2 15 (L) 12/09/2023    BUN 51 (H) 12/09/2023    CREATININE 2.9 (H) 12/09/2023    CALCIUM 8.9 12/09/2023    ANIONGAP 20 (H) 12/09/2023    EGFRNORACEVR 14 (A) 12/09/2023     Lab Results   Component Value Date    HGBA1C 5.7 (H) 11/29/2023     Lab Results   Component Value Date    CALCIUM 8.9 12/09/2023       Pertinent Medications Reviewed: reviewed  Scheduled Meds:   amLODIPine  5 mg Oral BID    cefTRIAXone (ROCEPHIN) IVPB  1 g Intravenous Q24H    heparin (porcine)  5,000 Units Subcutaneous Q8H    OLANZapine  2.5 mg Oral QHS    sodium chloride 0.9%  10 mL Intravenous Q8H     Continuous Infusions:   dextrose 5 % (D5W) 100 mL/hr at 12/09/23 0923     PRN Meds:.acetaminophen, melatonin, naloxone, senna-docusate 8.6-50 mg, sodium chloride 0.9%    Physical Findings/Assessment         Estimated/Assessed Needs    Weight Used For Calorie Calculations: 64.4 kg (141 lb 15.6 oz)  Energy Calorie Requirements (kcal): 25-35kcals/kg (1610-2254kcals/day)  Energy Need Method: Kcal/kg  Protein Requirements: 0.8-1.0g/kg (51-65g/day)  Weight Used For Protein Calculations: 64.4 kg (141 lb 15.6 oz)  Fluid Requirements (mL): 1000+output or per md order  Estimated Fluid Requirement Method: other (see comments) (CKD, +malnutrition)  RDA Method (mL): 25  CHO Requirement: 250      Nutrition Prescription Ordered    Current Diet Order: NPO    Evaluation of Received Nutrient/Fluid Intake    % Kcal Needs: NPO  % Protein Needs: NPO  I/O: +510mls since admit  Energy Calories Required: not meeting needs  Protein Required: not meeting needs  Fluid Required: not meeting needs  Comments: LBM: [place]ON  Tolerance: other (see comments) (NPO and decreased po intake prior to admit)  % Intake of Estimated Energy Needs: Other: npo  % Meal Intake: NPO    Nutrition Risk    Level of Risk/Frequency of Follow-up: moderate - high  (follow up: 2x per week)       Monitor and Evaluation    Food and Nutrient Intake: energy intake, food and beverage intake  Food and Nutrient Adminstration: diet order  Knowledge/Beliefs/Attitudes: beliefs and attitudes  Physical Activity and Function: nutrition-related ADLs and IADLs, factors affecting access to physical activity  Anthropometric Measurements: height/length, weight, weight change, body mass index  Biochemical Data, Medical Tests and Procedures: electrolyte and renal panel, gastrointestinal profile, glucose/endocrine profile, inflammatory profile, lipid profile       Nutrition Follow-Up    RD Follow-up?: Yes  Sophia Ho, MS, RDN, LDN

## 2023-12-09 NOTE — ASSESSMENT & PLAN NOTE
Malnutrition Type:  Context: acute illness or injury, chronic illness  Level: severe    Related to (etiology):   Decreased appetite   AMS, dementia   CKD  Infection    Signs and Symptoms (as evidenced by):   Decreased po intake   Weight loss     Malnutrition Characteristic Summary:  Weight Loss (Malnutrition): greater than 5% in 1 month  Energy Intake (Malnutrition): less than or equal to 75% for greater than or equal to 1 month  Subcutaneous Fat (Malnutrition): moderate depletion  Muscle Mass (Malnutrition): moderate depletion      Interventions (treatment strategy):  Commercial beverages   Collaboration with other providers    Nutrition Diagnosis Status:   Continues

## 2023-12-09 NOTE — ASSESSMENT & PLAN NOTE
- Ms. Dalila Garcia presents with worsening confusion x 9 hours   - UA positive for UTI   - s/p rocephin in ED   - continue rocephin  - UC pending

## 2023-12-09 NOTE — ASSESSMENT & PLAN NOTE
- baseline Cr appears to be about 0.8  - today Cr is 2.6  - family reports poor PO intake over past few days   - hydrate with NS overnight  - Monitor urine output and serial BMP and adjust therapy as needed. Avoid nephrotoxins and renally dose meds for GFR listed above.

## 2023-12-09 NOTE — H&P
"  [facility]  History & Physical    Patient Name: Dalila Garcia  MRN: [id number]  Patient Class: IP- Inpatient  Admission Date: 12/8/2023  Attending Physician: Lucille Blackmon MD   Primary Care Provider: Vazquez Marsh MD         Patient information was obtained from relative(s), past medical records, and ER records.     Subjective:     Principal Problem:UTI (urinary tract infection)    Chief Complaint:   Chief Complaint   Patient presents with    Altered Mental Status     Family activated EMS for pt more confused than baseline, hx of dementia. Pt initial O2 sat 80% on RA, currently in Afib , with initial SBP 80        HPI: Ms. Dalila Garcia is a 89 y.o. female, with PMH of Dementia, A. Fib, CHF, HTN, who presented to [facility] [place] ED on 12/8/23 due to increased confusion and mumbling for the past 9 hours. Her family reports that normally her speech is clear. She additionally has been restless, and "tossing and turning in bed with her legs up on the wall like she was trying to walk on the wall" per her family's report. They notes that she has been "mouth breathing" for the past week, and when EMS arrived, her O2 sats were reported to be in the 80% range on RA. Other associated symptoms include urinary retention, poor appetite and no intake x 2 days, vomiting after eating. EMS also reported that her SBP was 80, and she was in A. Fib with heart rates no higher than 102 upon arrival to the ED. In the ED, she was evaluated with labs, showing ARGENIS with BUN 47 and Cr of 2.6. A CBC showed leukocytosis of 28K, with left shift. A UA showed a nitritie positive UTI with 3+ leukocytes, >100 WBC/hpf, and many bacteria. She was treated in the ED with Rocephin. She was admitted to inpatient status.         Review of Systems   Unable to perform ROS: Dementia     Objective:     Vital Signs (Most Recent):  Temp: 98.1 °F (36.7 °C) (12/09/23 1113)  Pulse: 92 (12/09/23 1205)  Resp: 16 (12/09/23 " 1113)  BP: 133/85 (12/09/23 1113)  SpO2: 95 % (12/09/23 1113) Vital Signs (24h Range):  Temp:  [98 °F (36.7 °C)-98.6 °F (37 °C)] 98.1 °F (36.7 °C)  Pulse:  [] 92  Resp:  [16-25] 16  SpO2:  [94 %-100 %] 95 %  BP: (100-165)/() 133/85     Weight: 64.4 kg (141 lb 15.6 oz)  Body mass index is 22.24 kg/m².    Intake/Output Summary (Last 24 hours) at 12/9/2023 1214  Last data filed at 12/9/2023 0749  Gross per 24 hour   Intake 510 ml   Output --   Net 510 ml         Physical Exam  Vitals and nursing note reviewed.   Constitutional:       Appearance: She is ill-appearing.   HENT:      Head: Atraumatic.      Comments: Temporal wasting     Nose: Nose normal.      Mouth/Throat:      Mouth: Mucous membranes are dry.   Eyes:      Conjunctiva/sclera: Conjunctivae normal.   Cardiovascular:      Rate and Rhythm: Normal rate and regular rhythm.      Heart sounds: Murmur heard.   Pulmonary:      Effort: Pulmonary effort is normal. No respiratory distress.   Abdominal:      General: There is no distension.      Palpations: Abdomen is soft.      Tenderness: There is no abdominal tenderness. There is no guarding or rebound.      Comments: Hypoactive bowel sounds   Musculoskeletal:         General: Normal range of motion.      Cervical back: Normal range of motion.      Right lower leg: No edema.      Left lower leg: No edema.   Skin:     General: Skin is dry.      Capillary Refill: Capillary refill takes 2 to 3 seconds.   Neurological:      Comments: Responds to verbal and tactile stimulus, but keeps eyes closed and is nonverbal.  Moving all extremities and fidgeting in the bed             Significant Labs: All pertinent labs within the past 24 hours have been reviewed.    Significant Imaging: I have reviewed all pertinent imaging results/findings within the past 24 hours.  Imaging Results              X-Ray Chest AP Portable (Final result)  Result time 12/08/23 19:58:44      Final result by Eleni Triplett MD (12/08/23  19:58:44)                   Impression:      No acute intrathoracic abnormality detected.      Electronically signed by: Eleni Triplett  Date:    12/08/2023  Time:    19:58               Narrative:    EXAMINATION:  AP PORTABLE CHEST    CLINICAL HISTORY:  Shortness of breath    TECHNIQUE:  AP portable chest radiograph was submitted.    COMPARISON:  11/26/2023    FINDINGS:  AP portable chest radiograph demonstrates a cardiac silhouette within normal limits.  There is tortuosity and ectasia of the descending thoracic aorta.  There is no focal consolidation, pneumothorax, or pleural effusion.                                       Assessment/Plan:     * UTI (urinary tract infection)  - Ms. Dalila Garcia presents with worsening confusion x 9 hours   - UA positive for UTI   - s/p rocephin in ED   - continue rocephin  - UC pending       Acute renal failure superimposed on stage 2 chronic kidney disease  - baseline Cr appears to be about 0.8  - today Cr is 2.6  - family reports poor PO intake over past few days   - hydrate with NS overnight  - Monitor urine output and serial BMP and adjust therapy as needed. Avoid nephrotoxins and renally dose meds for GFR listed above.    Infectious encephalopathy  - suspect 2/2 infection   - delirium precautions ordered   - monitor       Atrial fibrillation  Patient with Paroxysmal (<7 days) atrial fibrillation which is uncontrolled currently with Calcium Channel Blocker. Patient is currently in atrial fibrillation.[id number] Score: 3. Not currently on anticoagulation.     Dementia  Patient with dementia. The patient does have signs of behavioral disturbance. Home dementia medications are Held or Continued: continued.. Continue non-pharmacologic interventions to prevent delirium (No VS between 11PM-5AM, activity during day, opening blinds, providing glasses/hearing aids, and up in chair during daytime). Will avoid narcotics and benzos unless absolutely necessary. PRN anti-psychotics  are prescribed to avoid self harm behaviors.    Essential hypertension  Chronic, controlled. Latest blood pressure and vitals reviewed-     Temp:  [98 °F (36.7 °C)]   Pulse:  []   Resp:  [20-25]   BP: (100-165)/(70-91)   SpO2:  [98 %-100 %] .   Home meds for hypertension were reviewed and noted below.   Hypertension Medications               amLODIPine (NORVASC) 5 MG tablet TAKE 1 TABLET(5 MG) BY MOUTH TWICE DAILY            While in the hospital, will manage blood pressure as follows; Continue home antihypertensive regimen    Will utilize p.r.n. blood pressure medication only if patient's blood pressure greater than 180/110 and she develops symptoms such as worsening chest pain or shortness of breath.    Congestive heart failure  Patient is identified as having heart failure that is Chronic. CHF is currently controlled. Latest ECHO performed and demonstrates- No results found for this or any previous visit.  Monitor clinical status closely, on telemetry. Patient is off CHF pathway.  Monitor strict Is&Os and daily weights.  Cardiology has not been consulted. Continue to stress to patient importance of self efficacy and  on diet for CHF. Last BNP reviewed- and noted below   Recent Labs   Lab 12/08/23  1933   *         VTE Risk Mitigation (From admission, onward)           Ordered     heparin (porcine) injection 5,000 Units  Every 8 hours         12/08/23 2234     IP VTE HIGH RISK PATIENT  Once         12/08/23 2234     Place sequential compression device  Until discontinued         12/08/23 2234     Place sequential compression device  Until discontinued         12/08/23 2144                                    Maryan Schmidt PA-C  Department of Hospital Medicine  [facility] - Emergency Dept

## 2023-12-09 NOTE — ASSESSMENT & PLAN NOTE
Hypernatremia  Metabolic and lactic acidosis  - Baseline creatinine approximately 0.8, last documented 2 weeks ago.  Reported poor intake that started approximately 2 weeks ago  - Creatinine on admission is 2.6, sodium of 147 with a bicarb of 18  - IV floor fluids with normal saline initiated on admission  - Sodium of 151 with creatinine up at 2.9 this morning, change IV fluids to D5W at 100 cc/hr today  - Repeat BMP later this afternoon and again in a.m., repeat lactic acid level  - Strict I&Os  - Will further adjust IV fluids as needed based on laboratory testing results

## 2023-12-09 NOTE — ASSESSMENT & PLAN NOTE
Debility  - Family reports a noticeable decline starting August of this year  - Progressive dementia explained and goals of care addressed, patient is a full code  - Palliative Care will be consulted on Monday for further goals of care discussions, and education and supportive of family

## 2023-12-09 NOTE — ED NOTES
RT reports blood sample is clotted and unable to run POC lactic. Will attempt to obtain another specimen.

## 2023-12-09 NOTE — ASSESSMENT & PLAN NOTE
- Patient is currently compensated, and clinically dry  - BNP of 216 as expected and last echo showed EF of 60-65% in May 2018  - Chest X ray unremarkable and without signs of volume overload  - Continue monitor volume status while patient is getting judicious IV fluids

## 2023-12-09 NOTE — ASSESSMENT & PLAN NOTE
Patient is identified as having heart failure that is Chronic. CHF is currently controlled. Latest ECHO performed and demonstrates- No results found for this or any previous visit.  Monitor clinical status closely, on telemetry. Patient is off CHF pathway.  Monitor strict Is&Os and daily weights.  Cardiology has not been consulted. Continue to stress to patient importance of self efficacy and  on diet for CHF. Last BNP reviewed- and noted below   Recent Labs   Lab 12/08/23  1933   *

## 2023-12-09 NOTE — PT/OT/SLP EVAL
"Speech Language Pathology Evaluation  Bedside Swallow    Patient Name:  Dalila Garcia   MRN:  [id number]  Admitting Diagnosis: UTI (urinary tract infection)    Recommendations:                 General Recommendations:    Diet recommendations:   SOLIDS: NPO,   Liquids: NPO   Alternate feeding and hydration method recommended. Pt unable  safely participate in oral feeding    Aspiration Precautions:   Do not recommend oral intake at this time     General Precautions: Standard, aspiration  Communication strategies:    Assessment:     Dalila Garcia is a 89 y.o. female with an SLP diagnosis of severe cognitive status impacting safety of oral intake.      History:     As per admit HPI: Ms. Dalila Garcia is a 89 y.o. female, with PMH of Dementia, A. Fib, CHF, HTN, who presented to [facility] [place] ED on 12/8/23 due to increased confusion and mumbling for the past 9 hours. Her family reports that normally her speech is clear. She additionally has been restless, and "tossing and turning in bed with her legs up on the wall like she was trying to walk on the wall" per her family's report. They notes that she has been "mouth breathing" for the past week, and when EMS arrived, her O2 sats were reported to be in the 80% range on RA. Other associated symptoms include urinary retention, poor appetite and no intake x 2 days, vomiting after eating. EMS also reported that her SBP was 80, and she was in A. Fib with heart rates no higher than 102 upon arrival to the ED. In the ED, she was evaluated with labs, showing ARGENIS with BUN 47 and Cr of 2.6. A CBC showed leukocytosis of 28K, with left shift. A UA showed a nitritie positive UTI with 3+ leukocytes, >100 WBC/hpf, and many bacteria. She was treated in the ED with Rocephin. She was admitted to inpatient status.          Past Medical History:   Diagnosis Date    Anxiety     Arthritis     Cataract     Congestive heart failure     Dementia     Dry eye syndrome     Dry senile macular degeneration "     Hypertension     Joint pain     Posterior capsular opacification, both eyes     Psychiatric problem     PVD (peripheral vascular disease)        Past Surgical History:   Procedure Laterality Date    CATARACT EXTRACTION      both eyes    HYSTERECTOMY      TOTAL KNEE ARTHROPLASTY      right         Subjective   Pt restless with reduce alertness level in hospital bed     Objective:     Oral Musculature Evaluation   Face is symmetrical at rest  Opened mouth resting posture  Pt unable to follow simple oral motor commands  Mouth opening, closing, lingual extension and lateralization noted in function    Bedside Swallow Eval:   Consistencies Assessed:  Thin liquids 1 ml of liquid via spoon      Oral and pharyngeal  Phase:   Pt demonstrated mouth closure, some lingual extension and licking of her lower lip in response to finger swipe of water to lower lip  However, she was not able to demonstrated safe level of alerting or attention to a familiar task when spoon presented to lower lip:  Dcr recognition of feeding task  Dcr safe level of alertness  and attention for oral feeding trial  UNABLE TO SAFELY OR ACTIVELY PARTICIPATE IN ORAL AND PHARYNGEAL SWALLOW EXAM. All liquid and food trials deferred    COGNITIVE STATUS: eyes closed. Restless in bed. Constant moving of limbs and moving in bed. Agitation characterized by restlness, pulling at sheets, pushing into side rails. Briefly stopped restless behavior in response to being sat upright and to finger swipes of water to lips. However, no eye opening, no focused and sustained attention to a simple familiar task. No localization to her name. Unable to follow simple commands or recognize familiar objects. No verbalizations    EDUCATION: family states pt ate select foods up until a few days ago. They state she likes soft foods such as fish, mashed or sweet potatoes. They report poor oral intake for a few days, then onset of gaging after swallowing followed by emesis.  Discussed that patient is not safe to feed at this time. And that speech would continue to follow      Multidisciplinary Problems       SLP Goals          Problem: SLP    Goal Priority Disciplines Outcome   SLP Goal     SLP Ongoing, Progressing   Description: 1. Pt will be able to sustain wakefulness for 3-5 min  2. Pt will be able to sustain attention to a simple, familiar task for 5 min  3. Pt will be able to consume thin liquids from a spoon with no signs of airway threat or aspiration given max assistance                       Plan:     Patient to be seen:  3 x/week, 5 x/week   Plan of Care expires:  12/23/23  Plan of Care reviewed with:  family   SLP Follow-Up:  Yes       Discharge recommendations:      Barriers to Discharge:      Time Tracking:     SLP Treatment Date:   12/09/23  Speech Start Time:  1535  Speech Stop Time:  1550     Speech Total Time (min):  15 min    Billable Minutes: Eval Swallow and Oral Function 15 min    12/09/2023

## 2023-12-09 NOTE — ASSESSMENT & PLAN NOTE
Chronic, controlled. Latest blood pressure and vitals reviewed-     Temp:  [98 °F (36.7 °C)]   Pulse:  []   Resp:  [20-25]   BP: (100-165)/(70-91)   SpO2:  [98 %-100 %] .   Home meds for hypertension were reviewed and noted below.   Hypertension Medications               amLODIPine (NORVASC) 5 MG tablet TAKE 1 TABLET(5 MG) BY MOUTH TWICE DAILY            While in the hospital, will manage blood pressure as follows; Continue home antihypertensive regimen    Will utilize p.r.n. blood pressure medication only if patient's blood pressure greater than 180/110 and she develops symptoms such as worsening chest pain or shortness of breath.

## 2023-12-09 NOTE — PLAN OF CARE
MSW met with the patient at the bedside. The patient's daughters are also at the bedside.     Patient is alert and oriented with no communication barriers.     Patient reported having DME at home.      Patients PCP is correct on the face sheet. Patient choice pharmacy is  [facility].      Patients' family will transport the patient home at discharge.     CM team will continue to follow.      12/09/23 0802   Discharge Assessment   Assessment Type Discharge Planning Assessment   Confirmed/corrected address, phone number and insurance Yes   Confirmed Demographics Correct on Facesheet   Source of Information patient;family;health record   People in Home child(nely), adult   Do you expect to return to your current living situation? Yes   Do you have help at home or someone to help you manage your care at home? Yes   Prior to hospitilization cognitive status: Alert/Oriented   Current cognitive status: Alert/Oriented   Walking or Climbing Stairs Difficulty yes   Dressing/Bathing Difficulty yes   Dressing/Bathing bathing difficulty, requires equipment   Equipment Currently Used at Home wheelchair;hospital bed   Readmission within 30 days? No   Patient currently being followed by outpatient case management? No   Do you currently have service(s) that help you manage your care at home? No   Do you take prescription medications? Yes   Do you have prescription coverage? Yes   Do you have any problems affording any of your prescribed medications? No   Is the patient taking medications as prescribed? yes   How do you get to doctors appointments? family or friend will provide   Are you on dialysis? No   Do you take coumadin? No   Discharge Plan A Home with family   DME Needed Upon Discharge  none   Discharge Plan discussed with: Adult children   Transition of Care Barriers None        Spontaneous

## 2023-12-09 NOTE — ED TRIAGE NOTES
Pt reports to ED via EMS with c/o AMS. Per family, around 2 pm today pt became more confused, unable to speak, and trying to get out of bed which worsened throughout the day. Upon EMS arrival, pt 80% on RA with SBP of 80. Pt  upon arrival to ED. Pt denies any pain, SOB.

## 2023-12-09 NOTE — PLAN OF CARE
Nutrition Plan of Care:    Recommendations     1. Advance oral diet per SLP recommendations  2. Recommend commercial beverages once NPO status advances  3. Recommend to consider appetite stimulant if appropriate  4. Monitor labs and weights  5. Collaboration with medical providers     Goals: Patient to advance with nutrition therapy prior to RD follow up.  Nutrition Goal Status: new  Communication of RD Recs: other (comment) (Consult, poc)     Assessment and Plan     Endocrine  Severe malnutrition  Malnutrition Type:  Context: acute illness or injury  Level: severe     Related to (etiology):   Decreased appetite   AMS, dementia   CKD  Infection     Signs and Symptoms (as evidenced by):   Decreased po intake   Weight loss      Malnutrition Characteristic Summary:  Weight Loss (Malnutrition): greater than 5% in 1 month  Energy Intake (Malnutrition): less than or equal to 75% for greater than or equal to 1 month        Interventions (treatment strategy):  1. Commercial beverages 2. Collaboration with medical providers     Nutrition Diagnosis Status:   New           Malnutrition Assessment  Malnutrition Context: acute illness or injury  Malnutrition Level: severe      Micronutrient Evaluation Summary: suspected deficiency   Weight Loss (Malnutrition): greater than 5% in 1 month  Energy Intake (Malnutrition): less than or equal to 75% for greater than or equal to 1 month       Sophia Ho MS, RDN, LDN

## 2023-12-09 NOTE — ASSESSMENT & PLAN NOTE
Patient with dementia. The patient does have signs of behavioral disturbance. Home dementia medications are Held or Continued: continued.. Continue non-pharmacologic interventions to prevent delirium (No VS between 11PM-5AM, activity during day, opening blinds, providing glasses/hearing aids, and up in chair during daytime). Will avoid narcotics and benzos unless absolutely necessary. PRN anti-psychotics are prescribed to avoid self harm behaviors.

## 2023-12-09 NOTE — NURSING
3:20 pm  Bladder scan performed at bedside, since patient make only 75 ml urine during my shift .Noted 63 ml urine in bladder.

## 2023-12-09 NOTE — SUBJECTIVE & OBJECTIVE
Interval History:  No acute events, patient received lorazepam 0.5 mg IV x2 overnight, family at the bedside (daughter and granddaughter) reports patient is more lethargic since getting medications.  Daughter reports patient had noticeable decline overall started in August of this year, and then approximately 2 weeks ago reported decreased p.o. intake, and then 2 days ago with worsening agitation and confusion.    Review of Systems   Unable to perform ROS: Dementia     Objective:     Vital Signs (Most Recent):  Temp: 98.1 °F (36.7 °C) (12/09/23 1113)  Pulse: 92 (12/09/23 1205)  Resp: 16 (12/09/23 1113)  BP: 133/85 (12/09/23 1113)  SpO2: 95 % (12/09/23 1113) Vital Signs (24h Range):  Temp:  [98 °F (36.7 °C)-98.6 °F (37 °C)] 98.1 °F (36.7 °C)  Pulse:  [] 92  Resp:  [16-25] 16  SpO2:  [94 %-100 %] 95 %  BP: (100-165)/() 133/85     Weight: 64.4 kg (141 lb 15.6 oz)  Body mass index is 22.24 kg/m².    Intake/Output Summary (Last 24 hours) at 12/9/2023 1255  Last data filed at 12/9/2023 0749  Gross per 24 hour   Intake 510 ml   Output --   Net 510 ml           Physical Exam  Vitals and nursing note reviewed.   Constitutional:       Appearance: She is ill-appearing.   HENT:      Head: Atraumatic.      Comments: Temporal wasting     Nose: Nose normal.      Mouth/Throat:      Mouth: Mucous membranes are dry.   Eyes:      Conjunctiva/sclera: Conjunctivae normal.   Cardiovascular:      Rate and Rhythm: Normal rate and regular rhythm.      Heart sounds: Murmur heard.   Pulmonary:      Effort: Pulmonary effort is normal. No respiratory distress.   Abdominal:      General: There is no distension.      Palpations: Abdomen is soft.      Tenderness: There is no abdominal tenderness. There is no guarding or rebound.      Comments: Hypoactive bowel sounds   Musculoskeletal:         General: Normal range of motion.      Cervical back: Normal range of motion.      Right lower leg: No edema.      Left lower leg: No edema.    Skin:     General: Skin is dry.      Capillary Refill: Capillary refill takes 2 to 3 seconds.   Neurological:      Comments: Responds to verbal and tactile stimulus, but keeps eyes closed and is nonverbal.  Moving all extremities and fidgeting in the bed           Significant Labs: All pertinent labs within the past 24 hours have been reviewed.    Significant Imaging: I have reviewed all pertinent imaging results/findings within the past 24 hours.

## 2023-12-09 NOTE — HPI
"Ms. Dalila Garcia is a 89 y.o. female, with PMH of Dementia, A. Fib, CHF, HTN, who presented to [facility] [place] on 12/8/23 due to increased confusion and mumbling for the past 9 hours. Her family reports that normally her speech is clear. She additionally has been restless, and "tossing and turning in bed with her legs up on the wall like she was trying to walk on the wall" per her family's report. They notes that she has been "mouth breathing" for the past week, and when EMS arrived, her O2 sats were reported to be in the 80% range on RA. Other associated symptoms include urinary retention, poor appetite and no intake x 2 days, vomiting after eating. EMS also reported that her SBP was 80, and she was in A. Fib with heart rates no higher than 102 upon arrival to the ED. In the ED, she was evaluated with labs, showing ARGENIS with BUN 47 and Cr of 2.6. A CBC showed leukocytosis of 28K, with left shift. A UA showed a nitritie positive UTI with 3+ leukocytes, >100 WBC/hpf, and many bacteria. She was treated in the ED with Rocephin. She was admitted to inpatient status.   "

## 2023-12-09 NOTE — HOSPITAL COURSE
Ms. Garcia presented with increased confusion and decreased p.o. intake.  Admitted with encephalopathy with UTI and dehydration.  Empirically started on Rocephin and treatment initiated with IV fluids. Blood culture NGTD and urine culture with multiple organisms and no organism in predominance. ARGENIS resolved with IV fluids and all electrolytes were corrected. Palliative care consulted given end stage dementia and family elected for home hospice but patient to remain a full code. Explained to family in detail that given her end stage dementia with pocketing of food/poor intake and bedbound status that continued progressive decline in status is expected. Patient completed full course of antibiotic treatment with Rocephin in the hospital and she was discharged on home hospice.     UTI (urinary tract infection)  Encephalopathy  Leukocytosis  - Patient was brought in by family for worsening confusion and agitation, and decreased intake for approximately 2 weeks  - Encephalopathy due to urinary tract infection along with marked electrolyte derangements in ARGENIS  - UA was consistent with infection  - Continued empiric ceftriaxone 1 g IV Q 24 hours, and she completed full course of treatment with IV abx.  - Continued IV fluids D5W until volume status and Na level corrected  - Urine culture was not sent off from the ER, sent for culture after abx were administered and it showed multiple organisms with none in predominance  - Blood cultures no growth to date  - Mentation improved back to baseline, WBC normalized and she remained AF  - Speech therapy followed and patient was advanced to full liquids  - Resolved     Acute renal failure superimposed on stage 2 chronic kidney disease  Hypernatremia  Metabolic and lactic acidosis  Hypokalemia  Hypophosphatemia  - Baseline creatinine approximately 0.8 - 1.2.  Reported poor intake that started approximately 2 weeks ago  - Creatinine on admission is 2.6, sodium of 147 with a bicarb of  18  - IV fluids with normal saline initiated on admission  - Sodium peak of 151 with creatinine up at 2.9, changed IV fluids to D5W at 100 cc/hr on 12/9  - Na level normalized, and creatinine trended to normal and all electrolytes were corrected  - Decreased IV rate to 50 cc/hr and change to D5LR on 12/10  - Renal function almost normalized  - Explained to family that return of ARGENIS is likely given her poor intake  - Resolved     Dementia  Debility  - Family reported a noticeable decline starting August of this year  - Progressive dementia explained and goals of care addressed  - Palliative Care input appreciated, and education and supportive of family done  - Patient is a full code but family was agreeable to home hospice  - At baseline, patient can answer in 1-2 words sometimes but not consistent; has poor intake and pockets her food, and is bedbound for some time. She was at her baseline on discharge  - Discharged with home hospice     Severe malnutrition  - Nutrition consulted. Most recent weight and BMI monitored  - Advanced to full liquids  - Expected to not improve due to dementia    Infectious encephalopathy  - As discussed above     Atrial fibrillation  - [id number] Score: 3. Not currently on anticoagulation.   - Rate is controlled     Essential hypertension  - Chronic, and controlled  - Continued amlodipine     Congestive heart failure  - Patient was compensated, and clinically dry  - BNP of 216 as expected and last echo showed EF of 60-65% in May 2018  - Chest X ray unremarkable and without signs of volume overload  - Continued to monitor volume status while patient was getting judicious IV fluids

## 2023-12-09 NOTE — ASSESSMENT & PLAN NOTE
Encephalopathy  Leukocytosis  - Patient was brought in by family for worsening confusion and agitation, and decreased intake for approximately 2 weeks  - Encephalopathy due to urinary tract infection along with marked electrolyte derangements in ARGENIS  - UA consistent with infection  - Continue empiric ceftriaxone 1 g IV Q 24 hours  - Change IV fluids D5W  - Urine culture was not sent off, will send now  - Blood cultures no growth to date  - Will follow up on cultures and continue expectant management  - Repeat labs later today and in a.m.

## 2023-12-09 NOTE — PLAN OF CARE
12/09/23 0822   Medicare Message   Important Message from Medicare regarding Discharge Appeal Rights Given to patient/caregiver;Explained to patient/caregiver;Signed/date by patient/caregiver   Date IMM was signed 12/09/23   Time IMM was signed 0800

## 2023-12-09 NOTE — ASSESSMENT & PLAN NOTE
- Nutrition consulted. Most recent weight and BMI monitored  - Will advance diet when mentation is appropriate

## 2023-12-09 NOTE — PROGRESS NOTES
"[facility] Medicine  Progress Note    Patient Name: Dalila Garcia  MRN: [id number]  Patient Class: IP- Inpatient   Admission Date: 12/8/2023  Length of Stay: 1 days  Attending Physician: Lucille Blackmon MD  Primary Care Provider: Vazquez Marsh MD        Subjective:     Principal Problem:UTI (urinary tract infection)        HPI:  Ms. Dalila Garcia is a 89 y.o. female, with PMH of Dementia, A. Fib, CHF, HTN, who presented to [facility] [place] [place] ED on 12/8/23 due to increased confusion and mumbling for the past 9 hours. Her family reports that normally her speech is clear. She additionally has been restless, and "tossing and turning in bed with her legs up on the wall like she was trying to walk on the wall" per her family's report. They notes that she has been "mouth breathing" for the past week, and when EMS arrived, her O2 sats were reported to be in the 80% range on RA. Other associated symptoms include urinary retention, poor appetite and no intake x 2 days, vomiting after eating. EMS also reported that her SBP was 80, and she was in A. Fib with heart rates no higher than 102 upon arrival to the ED. In the ED, she was evaluated with labs, showing ARGENIS with BUN 47 and Cr of 2.6. A CBC showed leukocytosis of 28K, with left shift. A UA showed a nitritie positive UTI with 3+ leukocytes, >100 WBC/hpf, and many bacteria. She was treated in the ED with Rocephin. She was admitted to inpatient status.     Overview/Hospital Course:  Ms. Garcia presented with increased confusion and decreased p.o. intake.  Admitted with encephalopathy with UTI and dehydration.  Empirically started on Rocephin and treatment initiated with IV fluids.    Interval History:  No acute events, patient received lorazepam 0.5 mg IV x2 overnight, family at the bedside (daughter and granddaughter) reports patient is more lethargic since getting medications.  Daughter reports patient had noticeable decline overall started in August of " this year, and then approximately 2 weeks ago reported decreased p.o. intake, and then 2 days ago with worsening agitation and confusion.    Review of Systems   Unable to perform ROS: Dementia     Objective:     Vital Signs (Most Recent):  Temp: 98.1 °F (36.7 °C) (12/09/23 1113)  Pulse: 92 (12/09/23 1205)  Resp: 16 (12/09/23 1113)  BP: 133/85 (12/09/23 1113)  SpO2: 95 % (12/09/23 1113) Vital Signs (24h Range):  Temp:  [98 °F (36.7 °C)-98.6 °F (37 °C)] 98.1 °F (36.7 °C)  Pulse:  [] 92  Resp:  [16-25] 16  SpO2:  [94 %-100 %] 95 %  BP: (100-165)/() 133/85     Weight: 64.4 kg (141 lb 15.6 oz)  Body mass index is 22.24 kg/m².    Intake/Output Summary (Last 24 hours) at 12/9/2023 1255  Last data filed at 12/9/2023 0749  Gross per 24 hour   Intake 510 ml   Output --   Net 510 ml           Physical Exam  Vitals and nursing note reviewed.   Constitutional:       Appearance: She is ill-appearing.   HENT:      Head: Atraumatic.      Comments: Temporal wasting     Nose: Nose normal.      Mouth/Throat:      Mouth: Mucous membranes are dry.   Eyes:      Conjunctiva/sclera: Conjunctivae normal.   Cardiovascular:      Rate and Rhythm: Normal rate and regular rhythm.      Heart sounds: Murmur heard.   Pulmonary:      Effort: Pulmonary effort is normal. No respiratory distress.   Abdominal:      General: There is no distension.      Palpations: Abdomen is soft.      Tenderness: There is no abdominal tenderness. There is no guarding or rebound.      Comments: Hypoactive bowel sounds   Musculoskeletal:         General: Normal range of motion.      Cervical back: Normal range of motion.      Right lower leg: No edema.      Left lower leg: No edema.   Skin:     General: Skin is dry.      Capillary Refill: Capillary refill takes 2 to 3 seconds.   Neurological:      Comments: Responds to verbal and tactile stimulus, but keeps eyes closed and is nonverbal.  Moving all extremities and fidgeting in the bed           Significant  Labs: All pertinent labs within the past 24 hours have been reviewed.    Significant Imaging: I have reviewed all pertinent imaging results/findings within the past 24 hours.    Assessment/Plan:      * UTI (urinary tract infection)  Encephalopathy  Leukocytosis  - Patient was brought in by family for worsening confusion and agitation, and decreased intake for approximately 2 weeks  - Encephalopathy due to urinary tract infection along with marked electrolyte derangements in ARGENIS  - UA consistent with infection  - Continue empiric ceftriaxone 1 g IV Q 24 hours  - Change IV fluids D5W  - Urine culture was not sent off, will send now  - Blood cultures no growth to date  - Will follow up on cultures and continue expectant management  - Repeat labs later today and in a.m.    Acute renal failure superimposed on stage 2 chronic kidney disease  Hypernatremia  Metabolic and lactic acidosis  - Baseline creatinine approximately 0.8, last documented 2 weeks ago.  Reported poor intake that started approximately 2 weeks ago  - Creatinine on admission is 2.6, sodium of 147 with a bicarb of 18  - IV floor fluids with normal saline initiated on admission  - Sodium of 151 with creatinine up at 2.9 this morning, change IV fluids to D5W at 100 cc/hr today  - Repeat BMP later this afternoon and again in a.m., repeat lactic acid level  - Strict I&Os  - Will further adjust IV fluids as needed based on laboratory testing results    Severe malnutrition  - Nutrition consulted. Most recent weight and BMI monitored  - Will advance diet when mentation is appropriate    Dementia  Debility  - Family reports a noticeable decline starting August of this year  - Progressive dementia explained and goals of care addressed, patient is a full code  - Palliative Care will be consulted on Monday for further goals of care discussions, and education and supportive of family    Infectious encephalopathy  - As discussed above  - Delirium precautions ordered,  monitor     Atrial fibrillation  - [id number] Score: 3. Not currently on anticoagulation.   - Rate is controlled    Essential hypertension  - Chronic, and controlled  - Continue amlodipine    Congestive heart failure  - Patient is currently compensated, and clinically dry  - BNP of 216 as expected and last echo showed EF of 60-65% in May 2018  - Chest X ray unremarkable and without signs of volume overload  - Continue monitor volume status while patient is getting judicious IV fluids      VTE Risk Mitigation (From admission, onward)           Ordered     heparin (porcine) injection 5,000 Units  Every 8 hours         12/08/23 2234     IP VTE HIGH RISK PATIENT  Once         12/08/23 2234     Place sequential compression device  Until discontinued         12/08/23 2234     Place sequential compression device  Until discontinued         12/08/23 2144                        Lucille Blackmon MD  Department of Hospital Medicine   [facility] Med Surg ([place])

## 2023-12-09 NOTE — ASSESSMENT & PLAN NOTE
Patient with Paroxysmal (<7 days) atrial fibrillation which is uncontrolled currently with Calcium Channel Blocker. Patient is currently in atrial fibrillation.[id number] Score: 3. Not currently on anticoagulation.

## 2023-12-09 NOTE — ACP (ADVANCE CARE PLANNING)
Advance Care Planning     Date: 12/09/2023    Code Status  Advanced directive addressed with daughter at the bedside given patient's advanced dementia and inability to participate.  Patient is a full code.       Will place consult to Palliative Care for Monday to further address goals of care and to provide education and support for the family.     I spent a total of >16 minutes engaging the patient in this advance care planning discussion.    CARLOS Blackmon MD

## 2023-12-09 NOTE — SUBJECTIVE & OBJECTIVE
Review of Systems   Unable to perform ROS: Dementia     Objective:     Vital Signs (Most Recent):  Temp: 98.1 °F (36.7 °C) (12/09/23 1113)  Pulse: 92 (12/09/23 1205)  Resp: 16 (12/09/23 1113)  BP: 133/85 (12/09/23 1113)  SpO2: 95 % (12/09/23 1113) Vital Signs (24h Range):  Temp:  [98 °F (36.7 °C)-98.6 °F (37 °C)] 98.1 °F (36.7 °C)  Pulse:  [] 92  Resp:  [16-25] 16  SpO2:  [94 %-100 %] 95 %  BP: (100-165)/() 133/85     Weight: 64.4 kg (141 lb 15.6 oz)  Body mass index is 22.24 kg/m².    Intake/Output Summary (Last 24 hours) at 12/9/2023 1214  Last data filed at 12/9/2023 0749  Gross per 24 hour   Intake 510 ml   Output --   Net 510 ml         Physical Exam  Vitals and nursing note reviewed.   Constitutional:       Appearance: She is ill-appearing.   HENT:      Head: Atraumatic.      Comments: Temporal wasting     Nose: Nose normal.      Mouth/Throat:      Mouth: Mucous membranes are dry.   Eyes:      Conjunctiva/sclera: Conjunctivae normal.   Cardiovascular:      Rate and Rhythm: Normal rate and regular rhythm.      Heart sounds: Murmur heard.   Pulmonary:      Effort: Pulmonary effort is normal. No respiratory distress.   Abdominal:      General: There is no distension.      Palpations: Abdomen is soft.      Tenderness: There is no abdominal tenderness. There is no guarding or rebound.      Comments: Hypoactive bowel sounds   Musculoskeletal:         General: Normal range of motion.      Cervical back: Normal range of motion.      Right lower leg: No edema.      Left lower leg: No edema.   Skin:     General: Skin is dry.      Capillary Refill: Capillary refill takes 2 to 3 seconds.   Neurological:      Comments: Responds to verbal and tactile stimulus, but keeps eyes closed and is nonverbal.  Moving all extremities and fidgeting in the bed             Significant Labs: All pertinent labs within the past 24 hours have been reviewed.    Significant Imaging: I have reviewed all pertinent imaging  results/findings within the past 24 hours.  Imaging Results              X-Ray Chest AP Portable (Final result)  Result time 12/08/23 19:58:44      Final result by Eleni Triplett MD (12/08/23 19:58:44)                   Impression:      No acute intrathoracic abnormality detected.      Electronically signed by: Eleni Triplett  Date:    12/08/2023  Time:    19:58               Narrative:    EXAMINATION:  AP PORTABLE CHEST    CLINICAL HISTORY:  Shortness of breath    TECHNIQUE:  AP portable chest radiograph was submitted.    COMPARISON:  11/26/2023    FINDINGS:  AP portable chest radiograph demonstrates a cardiac silhouette within normal limits.  There is tortuosity and ectasia of the descending thoracic aorta.  There is no focal consolidation, pneumothorax, or pleural effusion.

## 2023-12-10 LAB
ANION GAP SERPL CALC-SCNC: 15 MMOL/L (ref 8–16)
BASOPHILS # BLD AUTO: 0.04 K/UL (ref 0–0.2)
BASOPHILS NFR BLD: 0.2 % (ref 0–1.9)
BUN SERPL-MCNC: 53 MG/DL (ref 10–30)
CALCIUM SERPL-MCNC: 8.7 MG/DL (ref 8.7–10.5)
CHLORIDE SERPL-SCNC: 112 MMOL/L (ref 95–110)
CO2 SERPL-SCNC: 16 MMOL/L (ref 23–29)
CREAT SERPL-MCNC: 2.5 MG/DL (ref 0.5–1.4)
DIFFERENTIAL METHOD: ABNORMAL
EOSINOPHIL # BLD AUTO: 0 K/UL (ref 0–0.5)
EOSINOPHIL NFR BLD: 0 % (ref 0–8)
ERYTHROCYTE [DISTWIDTH] IN BLOOD BY AUTOMATED COUNT: 17.1 % (ref 11.5–14.5)
EST. GFR  (NO RACE VARIABLE): 17 ML/MIN/1.73 M^2
GLUCOSE SERPL-MCNC: 141 MG/DL (ref 70–110)
HCT VFR BLD AUTO: 41.8 % (ref 37–48.5)
HGB BLD-MCNC: 14 G/DL (ref 12–16)
IMM GRANULOCYTES # BLD AUTO: 0.34 K/UL (ref 0–0.04)
IMM GRANULOCYTES NFR BLD AUTO: 1.8 % (ref 0–0.5)
LYMPHOCYTES # BLD AUTO: 0.7 K/UL (ref 1–4.8)
LYMPHOCYTES NFR BLD: 3.9 % (ref 18–48)
MAGNESIUM SERPL-MCNC: 1.8 MG/DL (ref 1.6–2.6)
MCH RBC QN AUTO: 28.5 PG (ref 27–31)
MCHC RBC AUTO-ENTMCNC: 33.5 G/DL (ref 32–36)
MCV RBC AUTO: 85 FL (ref 82–98)
MONOCYTES # BLD AUTO: 0.5 K/UL (ref 0.3–1)
MONOCYTES NFR BLD: 2.8 % (ref 4–15)
NEUTROPHILS # BLD AUTO: 17 K/UL (ref 1.8–7.7)
NEUTROPHILS NFR BLD: 91.3 % (ref 38–73)
NRBC BLD-RTO: 0 /100 WBC
PLATELET # BLD AUTO: 174 K/UL (ref 150–450)
PMV BLD AUTO: 11.5 FL (ref 9.2–12.9)
POTASSIUM SERPL-SCNC: 3.5 MMOL/L (ref 3.5–5.1)
RBC # BLD AUTO: 4.91 M/UL (ref 4–5.4)
SODIUM SERPL-SCNC: 143 MMOL/L (ref 136–145)
WBC # BLD AUTO: 18.67 K/UL (ref 3.9–12.7)

## 2023-12-10 PROCEDURE — 21400001 HC TELEMETRY ROOM

## 2023-12-10 PROCEDURE — 87086 URINE CULTURE/COLONY COUNT: CPT | Performed by: HOSPITALIST

## 2023-12-10 PROCEDURE — 25000003 PHARM REV CODE 250: Performed by: HOSPITALIST

## 2023-12-10 PROCEDURE — 25000003 PHARM REV CODE 250: Performed by: PHYSICIAN ASSISTANT

## 2023-12-10 PROCEDURE — A4216 STERILE WATER/SALINE, 10 ML: HCPCS | Performed by: PHYSICIAN ASSISTANT

## 2023-12-10 PROCEDURE — 94761 N-INVAS EAR/PLS OXIMETRY MLT: CPT

## 2023-12-10 PROCEDURE — 80048 BASIC METABOLIC PNL TOTAL CA: CPT | Performed by: PHYSICIAN ASSISTANT

## 2023-12-10 PROCEDURE — 63600175 PHARM REV CODE 636 W HCPCS: Performed by: HOSPITALIST

## 2023-12-10 PROCEDURE — 83735 ASSAY OF MAGNESIUM: CPT | Performed by: PHYSICIAN ASSISTANT

## 2023-12-10 PROCEDURE — 85025 COMPLETE CBC W/AUTO DIFF WBC: CPT | Performed by: PHYSICIAN ASSISTANT

## 2023-12-10 PROCEDURE — 36415 COLL VENOUS BLD VENIPUNCTURE: CPT | Performed by: PHYSICIAN ASSISTANT

## 2023-12-10 PROCEDURE — 63600175 PHARM REV CODE 636 W HCPCS: Performed by: PHYSICIAN ASSISTANT

## 2023-12-10 RX ORDER — DEXTROSE, SODIUM CHLORIDE, SODIUM LACTATE, POTASSIUM CHLORIDE, AND CALCIUM CHLORIDE 5; .6; .31; .03; .02 G/100ML; G/100ML; G/100ML; G/100ML; G/100ML
INJECTION, SOLUTION INTRAVENOUS CONTINUOUS
Status: DISCONTINUED | OUTPATIENT
Start: 2023-12-10 | End: 2023-12-13 | Stop reason: HOSPADM

## 2023-12-10 RX ORDER — LABETALOL HYDROCHLORIDE 5 MG/ML
10 INJECTION, SOLUTION INTRAVENOUS EVERY 8 HOURS PRN
Status: DISCONTINUED | OUTPATIENT
Start: 2023-12-10 | End: 2023-12-13 | Stop reason: HOSPADM

## 2023-12-10 RX ORDER — MICONAZOLE NITRATE 2 %
POWDER (GRAM) TOPICAL 2 TIMES DAILY
Status: DISCONTINUED | OUTPATIENT
Start: 2023-12-10 | End: 2023-12-13 | Stop reason: HOSPADM

## 2023-12-10 RX ADMIN — Medication 10 ML: at 10:12

## 2023-12-10 RX ADMIN — Medication 10 ML: at 05:12

## 2023-12-10 RX ADMIN — SODIUM CHLORIDE, SODIUM LACTATE, POTASSIUM CHLORIDE, CALCIUM CHLORIDE AND DEXTROSE MONOHYDRATE: 5; 600; 310; 30; 20 INJECTION, SOLUTION INTRAVENOUS at 10:12

## 2023-12-10 RX ADMIN — CEFTRIAXONE SODIUM 1 G: 1 INJECTION, POWDER, FOR SOLUTION INTRAMUSCULAR; INTRAVENOUS at 09:12

## 2023-12-10 RX ADMIN — HEPARIN SODIUM 5000 UNITS: 5000 INJECTION INTRAVENOUS; SUBCUTANEOUS at 05:12

## 2023-12-10 RX ADMIN — HEPARIN SODIUM 5000 UNITS: 5000 INJECTION INTRAVENOUS; SUBCUTANEOUS at 03:12

## 2023-12-10 RX ADMIN — Medication 10 ML: at 02:12

## 2023-12-10 RX ADMIN — DEXTROSE MONOHYDRATE: 50 INJECTION, SOLUTION INTRAVENOUS at 05:12

## 2023-12-10 RX ADMIN — HEPARIN SODIUM 5000 UNITS: 5000 INJECTION INTRAVENOUS; SUBCUTANEOUS at 09:12

## 2023-12-10 NOTE — PROGRESS NOTES
"[facility] Medicine  Progress Note    Patient Name: Dalila Garcia  MRN: [id number]  Patient Class: IP- Inpatient   Admission Date: 12/8/2023  Length of Stay: 2 days  Attending Physician: Lucille Blackmon MD  Primary Care Provider: Vazquez Marsh MD        Subjective:     Principal Problem:UTI (urinary tract infection)        HPI:  Ms. Dalila Garcia is a 89 y.o. female, with PMH of Dementia, A. Fib, CHF, HTN, who [place] [facility] due to increased confusion and mumbling for the past 9 hours. Her family reports that normally her speech is clear. She additionally has been restless, and "tossing and turning in bed with her legs up on the wall like she was trying to walk on the wall" per her family's report. They notes that she has been "mouth breathing" for the past week, and when EMS arrived, her O2 sats were reported to be in the 80% range on RA. Other associated symptoms include urinary retention, poor appetite and no intake x 2 days, vomiting after eating. EMS also reported that her SBP was 80, and she was in A. Fib with heart rates no higher than 102 upon arrival to the ED. In the ED, she was evaluated with labs, showing ARGENIS with BUN 47 and Cr of 2.6. A CBC showed leukocytosis of 28K, with left shift. A UA showed a nitritie positive UTI with 3+ leukocytes, >100 WBC/hpf, and many bacteria. She was treated in the ED with Rocephin. She was admitted to inpatient status.     Overview/Hospital Course:  Ms. Garcia presented with increased confusion and decreased p.o. intake.  Admitted with encephalopathy with UTI and dehydration.  Empirically started on Rocephin and treatment initiated with IV fluids.    Interval History:  No acute events, patient more calm and less restless. Daughter and granddaughter at bedside report she opened her eyes and was mumbling.  Overall, more calm and less agitated.    Review of Systems   Unable to perform ROS: Dementia     Objective:     Vital " Signs (Most Recent):  Temp: 97.6 °F (36.4 °C) (12/10/23 0732)  Pulse: 94 (12/10/23 0732)  Resp: 16 (12/10/23 0732)  BP: (!) 142/90 (12/10/23 0732)  SpO2: 100 % (12/10/23 0732) Vital Signs (24h Range):  Temp:  [97.4 °F (36.3 °C)-98.3 °F (36.8 °C)] 97.6 °F (36.4 °C)  Pulse:  [84-98] 94  Resp:  [16-22] 16  SpO2:  [94 %-100 %] 100 %  BP: (133-180)/(76-90) 142/90     Weight: 64.4 kg (141 lb 15.6 oz)  Body mass index is 22.24 kg/m².    Intake/Output Summary (Last 24 hours) at 12/10/2023 0805  Last data filed at 12/10/2023 0715  Gross per 24 hour   Intake 2139.15 ml   Output 195 ml   Net 1944.15 ml           Physical Exam  Vitals and nursing note reviewed.   Constitutional:       Comments: Chronically ill appearing, and less restless    HENT:      Head: Atraumatic.      Comments: Temporal wasting     Nose: Nose normal.      Mouth/Throat:      Mouth: Mucous membranes are dry.   Eyes:      Conjunctiva/sclera: Conjunctivae normal.   Cardiovascular:      Rate and Rhythm: Normal rate and regular rhythm.      Heart sounds: Murmur heard.   Pulmonary:      Effort: Pulmonary effort is normal. No respiratory distress.   Abdominal:      General: Bowel sounds are normal. There is no distension.      Palpations: Abdomen is soft.      Tenderness: There is no abdominal tenderness. There is no guarding or rebound.   Musculoskeletal:         General: Normal range of motion.      Cervical back: Normal range of motion.      Right lower leg: No edema.      Left lower leg: No edema.   Skin:     General: Skin is dry.      Capillary Refill: Capillary refill takes 2 to 3 seconds.   Neurological:      Comments: Responds to verbal and tactile stimulus, but keeps eyes closed and is nonverbal. Tries to keep eyes tightly closed on attempted exam. Moving all extremities and moving around in the bed           Significant Labs: All pertinent labs within the past 24 hours have been reviewed.    Significant Imaging: I have reviewed all pertinent imaging  results/findings within the past 24 hours.    Assessment/Plan:      * UTI (urinary tract infection)  Encephalopathy  Leukocytosis  - Patient was brought in by family for worsening confusion and agitation, and decreased intake for approximately 2 weeks  - Encephalopathy due to urinary tract infection along with marked electrolyte derangements in ARGENIS  - UA consistent with infection  - Continue empiric ceftriaxone 1 g IV Q 24 hours  - Continue IV fluids D5W  - Urine culture was not sent off from the ER, will send for culture  - Blood cultures no growth to date  - Mentation with slight improvement, less agitated, WBC trending down and she remains AF  - Speech following and patient to remain NPO except for medications until more alert  - Will follow up on cultures and continue expectant management  - Repeat labs later today and in a.m.    Acute renal failure superimposed on stage 2 chronic kidney disease  Hypernatremia  Metabolic and lactic acidosis  - Baseline creatinine approximately 0.8, last documented 2 weeks ago.  Reported poor intake that started approximately 2 weeks ago  - Creatinine on admission is 2.6, sodium of 147 with a bicarb of 18  - IV floor fluids with normal saline initiated on admission  - Sodium peak of 151 with creatinine up at 2.9, change IV fluids to D5W at 100 cc/hr on 12/9  - Na level normalized, and creatinine trending down  - Continue strict I&Os  - Decrease IV rate to 50 cc/hr and change to D5LR  - Will further adjust IV fluids as needed based on laboratory testing results    Severe malnutrition  - Nutrition consulted. Most recent weight and BMI monitored  - Will advance diet when mentation is appropriate    Dementia  Debility  - Family reports a noticeable decline starting August of this year  - Progressive dementia explained and goals of care addressed, patient is a full code  - Palliative Care will be consulted on Monday for further goals of care discussions, and education and supportive  of family    Infectious encephalopathy  - As discussed above  - Delirium precautions ordered, monitor     Atrial fibrillation  - [id number] Score: 3. Not currently on anticoagulation.   - Rate is controlled    Essential hypertension  - Chronic, and controlled  - Continue amlodipine    Congestive heart failure  - Patient is currently compensated, and clinically dry  - BNP of 216 as expected and last echo showed EF of 60-65% in May 2018  - Chest X ray unremarkable and without signs of volume overload  - Continue monitor volume status while patient is getting judicious IV fluids      VTE Risk Mitigation (From admission, onward)           Ordered     heparin (porcine) injection 5,000 Units  Every 8 hours         12/08/23 2234     IP VTE HIGH RISK PATIENT  Once         12/08/23 2234     Place sequential compression device  Until discontinued         12/08/23 2234     Place sequential compression device  Until discontinued         12/08/23 2144                          Lucille Blackmon MD  Department of Hospital Medicine   [facility] S[facility] ([place])

## 2023-12-10 NOTE — ASSESSMENT & PLAN NOTE
Encephalopathy  Leukocytosis  - Patient was brought in by family for worsening confusion and agitation, and decreased intake for approximately 2 weeks  - Encephalopathy due to urinary tract infection along with marked electrolyte derangements in ARGENIS  - UA consistent with infection  - Continue empiric ceftriaxone 1 g IV Q 24 hours  - Continue IV fluids D5W  - Urine culture was not sent off from the ER, will send for culture  - Blood cultures no growth to date  - Mentation with slight improvement, less agitated, WBC trending down and she remains AF  - Speech following and patient to remain NPO except for medications until more alert  - Will follow up on cultures and continue expectant management  - Repeat labs later today and in a.m.

## 2023-12-10 NOTE — ASSESSMENT & PLAN NOTE
Hypernatremia  Metabolic and lactic acidosis  - Baseline creatinine approximately 0.8, last documented 2 weeks ago.  Reported poor intake that started approximately 2 weeks ago  - Creatinine on admission is 2.6, sodium of 147 with a bicarb of 18  - IV floor fluids with normal saline initiated on admission  - Sodium peak of 151 with creatinine up at 2.9, change IV fluids to D5W at 100 cc/hr on 12/9  - Na level normalized, and creatinine trending down  - Continue strict I&Os  - Decrease IV rate to 50 cc/hr and change to D5LR  - Will further adjust IV fluids as needed based on laboratory testing results

## 2023-12-10 NOTE — SUBJECTIVE & OBJECTIVE
Interval History:  No acute events, patient more calm and less restless. Daughter and granddaughter at bedside report she opened her eyes and was mumbling.  Overall, more calm and less agitated.    Review of Systems   Unable to perform ROS: Dementia     Objective:     Vital Signs (Most Recent):  Temp: 97.6 °F (36.4 °C) (12/10/23 0732)  Pulse: 94 (12/10/23 0732)  Resp: 16 (12/10/23 0732)  BP: (!) 142/90 (12/10/23 0732)  SpO2: 100 % (12/10/23 0732) Vital Signs (24h Range):  Temp:  [97.4 °F (36.3 °C)-98.3 °F (36.8 °C)] 97.6 °F (36.4 °C)  Pulse:  [84-98] 94  Resp:  [16-22] 16  SpO2:  [94 %-100 %] 100 %  BP: (133-180)/(76-90) 142/90     Weight: 64.4 kg (141 lb 15.6 oz)  Body mass index is 22.24 kg/m².    Intake/Output Summary (Last 24 hours) at 12/10/2023 0805  Last data filed at 12/10/2023 0715  Gross per 24 hour   Intake 2139.15 ml   Output 195 ml   Net 1944.15 ml           Physical Exam  Vitals and nursing note reviewed.   Constitutional:       Comments: Chronically ill appearing, and less restless    HENT:      Head: Atraumatic.      Comments: Temporal wasting     Nose: Nose normal.      Mouth/Throat:      Mouth: Mucous membranes are dry.   Eyes:      Conjunctiva/sclera: Conjunctivae normal.   Cardiovascular:      Rate and Rhythm: Normal rate and regular rhythm.      Heart sounds: Murmur heard.   Pulmonary:      Effort: Pulmonary effort is normal. No respiratory distress.   Abdominal:      General: Bowel sounds are normal. There is no distension.      Palpations: Abdomen is soft.      Tenderness: There is no abdominal tenderness. There is no guarding or rebound.   Musculoskeletal:         General: Normal range of motion.      Cervical back: Normal range of motion.      Right lower leg: No edema.      Left lower leg: No edema.   Skin:     General: Skin is dry.      Capillary Refill: Capillary refill takes 2 to 3 seconds.   Neurological:      Comments: Responds to verbal and tactile stimulus, but keeps eyes closed and  is nonverbal. Tries to keep eyes tightly closed on attempted exam. Moving all extremities and moving around in the bed           Significant Labs: All pertinent labs within the past 24 hours have been reviewed.    Significant Imaging: I have reviewed all pertinent imaging results/findings within the past 24 hours.

## 2023-12-10 NOTE — PLAN OF CARE
"Pt remained free of falls and injuries throughout shift. Pt disoriented X4 and not speaking most of the shift, however pt spoke for the first time since admission to hospital and when asked how she was feeling, she said "I think I'm doing alright." Dysarthric speech noted. Pt less agitated and restless this shift. Purposeful hourly rounding performed. D5W infusing per MAR. Pt bedfast, weight shift assistance provided. Pure wick in place to suction. VSS on 2 L O2 NC. Pt resting in bed, NADMADDIE, daughters at bedside. Bed low and locked, bed alarm on, call light in reach. Side rails up x3. Report given to HILLARY Locke.     "

## 2023-12-10 NOTE — PLAN OF CARE
POC reviewed. Patient disoriented x4. O2 sats stable on 2 lit NC. Patient BP remained on higher side but does not meet criteria to give PRN BP medicine.Urine culture sent as per order. No complaints of pain. CHG wipes given.Noticed smear brownish things on her anus while cleaning not sure if its a bowel movement or just a greasy skin debris. Pure wick utilized for voiding. IV fluid cont as per MAR.No injuries, falls, or trauma occurred during shift. Purposeful rounding completed. Bed low and locked with side rails up x 3 and call light within reach. Family members at bedside.  Problem: Adult Inpatient Plan of Care  Goal: Plan of Care Review  Outcome: Ongoing, Progressing  Goal: Patient-Specific Goal (Individualized)  Outcome: Ongoing, Progressing  Goal: Absence of Hospital-Acquired Illness or Injury  Outcome: Ongoing, Progressing  Goal: Optimal Comfort and Wellbeing  Outcome: Ongoing, Progressing  Goal: Readiness for Transition of Care  Outcome: Ongoing, Progressing     Problem: Fluid and Electrolyte Imbalance (Acute Kidney Injury/Impairment)  Goal: Fluid and Electrolyte Balance  Outcome: Ongoing, Progressing     Problem: Oral Intake Inadequate (Acute Kidney Injury/Impairment)  Goal: Optimal Nutrition Intake  Outcome: Ongoing, Progressing     Problem: Renal Function Impairment (Acute Kidney Injury/Impairment)  Goal: Effective Renal Function  Outcome: Ongoing, Progressing     Problem: Skin Injury Risk Increased  Goal: Skin Health and Integrity  Outcome: Ongoing, Progressing     Problem: Fall Injury Risk  Goal: Absence of Fall and Fall-Related Injury  Outcome: Ongoing, Progressing     Problem: Coping Ineffective  Goal: Effective Coping  Outcome: Ongoing, Progressing

## 2023-12-11 PROBLEM — Z71.89 GOALS OF CARE, COUNSELING/DISCUSSION: Status: ACTIVE | Noted: 2023-12-11

## 2023-12-11 LAB
ANION GAP SERPL CALC-SCNC: 14 MMOL/L (ref 8–16)
BACTERIA UR CULT: NORMAL
BACTERIA UR CULT: NORMAL
BASOPHILS # BLD AUTO: 0.04 K/UL (ref 0–0.2)
BASOPHILS NFR BLD: 0.2 % (ref 0–1.9)
BUN SERPL-MCNC: 48 MG/DL (ref 10–30)
CALCIUM SERPL-MCNC: 8.7 MG/DL (ref 8.7–10.5)
CHLORIDE SERPL-SCNC: 112 MMOL/L (ref 95–110)
CO2 SERPL-SCNC: 18 MMOL/L (ref 23–29)
CREAT SERPL-MCNC: 1.7 MG/DL (ref 0.5–1.4)
DIFFERENTIAL METHOD: ABNORMAL
EOSINOPHIL # BLD AUTO: 0 K/UL (ref 0–0.5)
EOSINOPHIL NFR BLD: 0 % (ref 0–8)
ERYTHROCYTE [DISTWIDTH] IN BLOOD BY AUTOMATED COUNT: 16.6 % (ref 11.5–14.5)
EST. GFR  (NO RACE VARIABLE): 27 ML/MIN/1.73 M^2
GLUCOSE SERPL-MCNC: 119 MG/DL (ref 70–110)
HCT VFR BLD AUTO: 42.2 % (ref 37–48.5)
HGB BLD-MCNC: 14.5 G/DL (ref 12–16)
IMM GRANULOCYTES # BLD AUTO: 0.24 K/UL (ref 0–0.04)
IMM GRANULOCYTES NFR BLD AUTO: 1.4 % (ref 0–0.5)
LYMPHOCYTES # BLD AUTO: 0.7 K/UL (ref 1–4.8)
LYMPHOCYTES NFR BLD: 4.2 % (ref 18–48)
MAGNESIUM SERPL-MCNC: 1.8 MG/DL (ref 1.6–2.6)
MCH RBC QN AUTO: 28.5 PG (ref 27–31)
MCHC RBC AUTO-ENTMCNC: 34.4 G/DL (ref 32–36)
MCV RBC AUTO: 83 FL (ref 82–98)
MONOCYTES # BLD AUTO: 0.4 K/UL (ref 0.3–1)
MONOCYTES NFR BLD: 2.5 % (ref 4–15)
NEUTROPHILS # BLD AUTO: 16.1 K/UL (ref 1.8–7.7)
NEUTROPHILS NFR BLD: 91.7 % (ref 38–73)
NRBC BLD-RTO: 0 /100 WBC
PLATELET # BLD AUTO: 169 K/UL (ref 150–450)
PMV BLD AUTO: 11 FL (ref 9.2–12.9)
POTASSIUM SERPL-SCNC: 2.9 MMOL/L (ref 3.5–5.1)
RBC # BLD AUTO: 5.09 M/UL (ref 4–5.4)
SODIUM SERPL-SCNC: 144 MMOL/L (ref 136–145)
WBC # BLD AUTO: 17.51 K/UL (ref 3.9–12.7)

## 2023-12-11 PROCEDURE — 83735 ASSAY OF MAGNESIUM: CPT | Performed by: PHYSICIAN ASSISTANT

## 2023-12-11 PROCEDURE — 21400001 HC TELEMETRY ROOM

## 2023-12-11 PROCEDURE — 85025 COMPLETE CBC W/AUTO DIFF WBC: CPT | Performed by: PHYSICIAN ASSISTANT

## 2023-12-11 PROCEDURE — 63600175 PHARM REV CODE 636 W HCPCS: Performed by: HOSPITALIST

## 2023-12-11 PROCEDURE — 92610 EVALUATE SWALLOWING FUNCTION: CPT

## 2023-12-11 PROCEDURE — 99223 1ST HOSP IP/OBS HIGH 75: CPT | Mod: ,,, | Performed by: STUDENT IN AN ORGANIZED HEALTH CARE EDUCATION/TRAINING PROGRAM

## 2023-12-11 PROCEDURE — 80048 BASIC METABOLIC PNL TOTAL CA: CPT | Performed by: PHYSICIAN ASSISTANT

## 2023-12-11 PROCEDURE — 36415 COLL VENOUS BLD VENIPUNCTURE: CPT | Performed by: PHYSICIAN ASSISTANT

## 2023-12-11 PROCEDURE — 94761 N-INVAS EAR/PLS OXIMETRY MLT: CPT

## 2023-12-11 PROCEDURE — 25000003 PHARM REV CODE 250: Performed by: PHYSICIAN ASSISTANT

## 2023-12-11 PROCEDURE — 63600175 PHARM REV CODE 636 W HCPCS: Performed by: PHYSICIAN ASSISTANT

## 2023-12-11 PROCEDURE — A4216 STERILE WATER/SALINE, 10 ML: HCPCS | Performed by: PHYSICIAN ASSISTANT

## 2023-12-11 PROCEDURE — 99223 PR INITIAL HOSPITAL CARE,LEVL III: ICD-10-PCS | Mod: ,,, | Performed by: STUDENT IN AN ORGANIZED HEALTH CARE EDUCATION/TRAINING PROGRAM

## 2023-12-11 RX ORDER — POTASSIUM CHLORIDE 7.45 MG/ML
10 INJECTION INTRAVENOUS
Status: COMPLETED | OUTPATIENT
Start: 2023-12-11 | End: 2023-12-11

## 2023-12-11 RX ADMIN — OLANZAPINE 2.5 MG: 2.5 TABLET, FILM COATED ORAL at 11:12

## 2023-12-11 RX ADMIN — Medication 10 ML: at 01:12

## 2023-12-11 RX ADMIN — SODIUM CHLORIDE, SODIUM LACTATE, POTASSIUM CHLORIDE, CALCIUM CHLORIDE AND DEXTROSE MONOHYDRATE: 5; 600; 310; 30; 20 INJECTION, SOLUTION INTRAVENOUS at 06:12

## 2023-12-11 RX ADMIN — MICONAZOLE NITRATE: 20 POWDER TOPICAL at 12:12

## 2023-12-11 RX ADMIN — MICONAZOLE NITRATE: 20 POWDER TOPICAL at 08:12

## 2023-12-11 RX ADMIN — HEPARIN SODIUM 5000 UNITS: 5000 INJECTION INTRAVENOUS; SUBCUTANEOUS at 06:12

## 2023-12-11 RX ADMIN — Medication 10 ML: at 09:12

## 2023-12-11 RX ADMIN — Medication 10 ML: at 06:12

## 2023-12-11 RX ADMIN — HEPARIN SODIUM 5000 UNITS: 5000 INJECTION INTRAVENOUS; SUBCUTANEOUS at 09:12

## 2023-12-11 RX ADMIN — CEFTRIAXONE SODIUM 1 G: 1 INJECTION, POWDER, FOR SOLUTION INTRAMUSCULAR; INTRAVENOUS at 08:12

## 2023-12-11 RX ADMIN — HEPARIN SODIUM 5000 UNITS: 5000 INJECTION INTRAVENOUS; SUBCUTANEOUS at 01:12

## 2023-12-11 RX ADMIN — POTASSIUM CHLORIDE 10 MEQ: 7.46 INJECTION, SOLUTION INTRAVENOUS at 10:12

## 2023-12-11 RX ADMIN — POTASSIUM CHLORIDE 10 MEQ: 7.46 INJECTION, SOLUTION INTRAVENOUS at 09:12

## 2023-12-11 RX ADMIN — POTASSIUM CHLORIDE 10 MEQ: 7.46 INJECTION, SOLUTION INTRAVENOUS at 08:12

## 2023-12-11 NOTE — SUBJECTIVE & OBJECTIVE
Past Medical History:   Diagnosis Date    Anxiety     Arthritis     Cataract     Congestive heart failure     Dementia     Dry eye syndrome     Dry senile macular degeneration     Hypertension     Joint pain     Posterior capsular opacification, both eyes     Psychiatric problem     PVD (peripheral vascular disease)        Past Surgical History:   Procedure Laterality Date    CATARACT EXTRACTION      both eyes    HYSTERECTOMY      TOTAL KNEE ARTHROPLASTY      right       Review of patient's allergies indicates:   Allergen Reactions    Lotensin [benazepril] Edema    Celexa [citalopram] Other (See Comments)     Hot flashes        Medications:  Continuous Infusions:   dextrose 5% lactated ringers 50 mL/hr at 23 0616     Scheduled Meds:   amLODIPine  5 mg Oral BID    cefTRIAXone (ROCEPHIN) IVPB  1 g Intravenous Q24H    heparin (porcine)  5,000 Units Subcutaneous Q8H    miconazole NITRATE 2 %   Topical (Top) BID    OLANZapine  2.5 mg Oral QHS    sodium chloride 0.9%  10 mL Intravenous Q8H     PRN Meds:acetaminophen, labetaloL, melatonin, naloxone, senna-docusate 8.6-50 mg, sodium chloride 0.9%    Family History       Problem Relation (Age of Onset)    Cancer Sister, Brother, Sister, Sister    Cataracts Son    Diabetes Son    Glaucoma Mother          Tobacco Use    Smoking status: Former     Current packs/day: 0.00     Types: Cigarettes     Quit date: 2005     Years since quittin.6    Smokeless tobacco: Never   Substance and Sexual Activity    Alcohol use: No    Drug use: No    Sexual activity: Not Currently       Objective:     Vital Signs (Most Recent):  Temp: 97.5 °F (36.4 °C) (23 0732)  Pulse: 106 (23 1000)  Resp: 16 (23 0732)  BP: (!) 168/87 (23 0755)  SpO2: 100 % (23 0744) Vital Signs (24h Range):  Temp:  [97.2 °F (36.2 °C)-97.6 °F (36.4 °C)] 97.5 °F (36.4 °C)  Pulse:  [] 106  Resp:  [16-18] 16  SpO2:  [98 %-100 %] 100 %  BP: (138-190)/() 168/87     Weight:  "64.4 kg (141 lb 15.6 oz)  Body mass index is 22.24 kg/m².       Physical Exam  Vitals and nursing note reviewed.   Constitutional:       Appearance: She is ill-appearing.      Comments: Eyes intermittently open, unable to communicate   HENT:      Mouth/Throat:      Comments: Dry mouth  Eyes:      Comments: Did not track to my voice, maintain eye contact or engage   Pulmonary:      Effort: No respiratory distress.   Neurological:      Comments: Awake, with eyes intermittently open, unable to conversate             Review of Symptoms        Living Arrangements:  Lives with family    Psychosocial/Cultural:   See Palliative Psychosocial Note: Yes  - lives at home with daughter Carolyn   **Primary  to Follow**  Palliative Care  Consult: No        Advance Care Planning   Advance Directives:     Decision Making:  Patient unable to communicate due to disease severity/cognitive impairment and Family answered questions  Goals of Care: The family endorses that what is most important right now is to focus on medical optimization and QOL.     Accordingly, we have decided that the best plan to meet the families goals includes getting more information regarding home hospice.          Significant Labs: reviewed  CBC:   Recent Labs   Lab 12/11/23 0519   WBC 17.51*   HGB 14.5   HCT 42.2   MCV 83        BMP:  Recent Labs   Lab 12/11/23 0519   *      K 2.9*   *   CO2 18*   BUN 48*   CREATININE 1.7*   CALCIUM 8.7   MG 1.8     LFT:  Lab Results   Component Value Date    AST 39 12/08/2023    ALKPHOS 107 12/08/2023    BILITOT 1.5 (H) 12/08/2023     Albumin:   Albumin   Date Value Ref Range Status   12/08/2023 3.2 (L) 3.5 - 5.2 g/dL Final     Protein:   Total Protein   Date Value Ref Range Status   12/08/2023 6.9 6.0 - 8.4 g/dL Final     Lactic acid:   No results found for: "LACTATE"    Significant Imaging: reviewed    "

## 2023-12-11 NOTE — ASSESSMENT & PLAN NOTE
12/11/2023:  - chart reviewed in depth  - discussed with primary team and case management  - patient seen at bedside with her daughter (Carolyn) and god daughter at bedside  - patient is seen lying in bed, eyes intermittently open and patient unable to track to voice and unable to participate in conversation  - Carolyn cares for patient at home on her own  - she shares how difficult things have been  - she has noted her mother progressive decline over the last year, with a more rapid decline since August of this year  - patient is now bedbound, limited communication ability and diminished PO intake the past 1-2 weeks  - she shared that even before the UTI concerns, things were becoming more difficult  - recently patient became much more agitated and restless which led to admission where UTI was discovered and tx begun  - Carolyn shared that she has been trying to get more support at home but nothing has worked out  - her hope is to ensure her mother has optimization of support and qol at home for the time she has left  - discussed home hospice and what it entails. Carolyn stated she was unaware hospice could be done at home like that  - provided her more information regarding hospice philosophy, where it can be done and the support it provides at home  - she was very much receptive to that and open to getting more information  - she feels the extra support and home would be of much assistance  - made them aware of next step in process being case mangement providing list of hospice agencies and them choosing a couple to interview and get more information from. Ultimately then they can decide who they prefer.   - discussed code status and what it entails, to which Carolyn was not receptive to having that conversation, stating immediately she wishes to maintain full code. She was not interested in delving further into this conversation at this time. Respected her wishes on that front and made her aware that is  something hospice can discuss further with her at home too if it is decided on.   - Denise was appreciative of the conversation  - made nursing aware of patients dry mouth and need for oral care upon my departure  - discussed conversation with primary team and case management to update them regarding wish for hospice information session  [] goals are for optimization and focus on QOL. Next step is for denise to discuss further with case management regarding arrangement of home hospice information sessions to guide next step decision making.   [] dispo per case management and primary team

## 2023-12-11 NOTE — SUBJECTIVE & OBJECTIVE
Interval History:  No acute events, patient alert and saying few words. Daughter at the bedside report she stated she was hungry. Patient is bedbound at baseline. Update given to daughter.    Review of Systems   Unable to perform ROS: Dementia     Objective:     Vital Signs (Most Recent):  Temp: 97.8 °F (36.6 °C) (12/11/23 1152)  Pulse: 97 (12/11/23 1205)  Resp: 16 (12/11/23 1152)  BP: (!) 170/99 (12/11/23 1205)  SpO2: 100 % (12/11/23 1152) Vital Signs (24h Range):  Temp:  [97.2 °F (36.2 °C)-97.8 °F (36.6 °C)] 97.8 °F (36.6 °C)  Pulse:  [] 97  Resp:  [16-18] 16  SpO2:  [98 %-100 %] 100 %  BP: (138-190)/() 170/99     Weight: 64.4 kg (141 lb 15.6 oz)  Body mass index is 22.24 kg/m².    Intake/Output Summary (Last 24 hours) at 12/11/2023 1422  Last data filed at 12/11/2023 0612  Gross per 24 hour   Intake 1088.23 ml   Output 600 ml   Net 488.23 ml           Physical Exam  Vitals and nursing note reviewed.   Constitutional:       Comments: Chronically ill appearing   HENT:      Head: Atraumatic.      Comments: Temporal wasting     Nose: Nose normal.      Mouth/Throat:      Mouth: Mucous membranes are dry.   Eyes:      Conjunctiva/sclera: Conjunctivae normal.   Cardiovascular:      Rate and Rhythm: Normal rate and regular rhythm.      Heart sounds: Murmur heard.   Pulmonary:      Effort: Pulmonary effort is normal. No respiratory distress.   Abdominal:      General: Bowel sounds are normal. There is no distension.      Palpations: Abdomen is soft.      Tenderness: There is no abdominal tenderness. There is no guarding or rebound.   Musculoskeletal:         General: Normal range of motion.      Cervical back: Normal range of motion.      Right lower leg: No edema.      Left lower leg: No edema.   Skin:     General: Skin is dry.      Capillary Refill: Capillary refill takes 2 to 3 seconds.   Neurological:      Mental Status: She is alert. She is disoriented.      Comments: Awake, alert, able to answer few  simple questions with 1-2 words, moving all extremities and moving around in the bed           Significant Labs: All pertinent labs within the past 24 hours have been reviewed.    Significant Imaging: I have reviewed all pertinent imaging results/findings within the past 24 hours.

## 2023-12-11 NOTE — CARE UPDATE
12/11/23 0744   PRE-TX-O2   Device (Oxygen Therapy) room air   SpO2 100 %   Pulse Oximetry Type Intermittent   $ Pulse Oximetry - Multiple Charge Pulse Oximetry - Multiple

## 2023-12-11 NOTE — PT/OT/SLP EVAL
"Speech Language Pathology Evaluation  Bedside Swallow    Patient Name:  Dalila Garcia   MRN:  [id number]  Admitting Diagnosis: UTI (urinary tract infection)    Recommendations:                 General Recommendations:    SLP to follow 3-5x/week during admit for ongoing assessment of oropharyngeal swallow function     Diet recommendations:    Full liquid diet recommended   small volume of thin pureed solids when awake  thin liquids (thin liquid IDDSI 0)    Aspiration Precautions:   Feed only when awake/alert  Recommend feeding only when requesting PO  Allow pt to self feed as able, providing support and guidance via hand over hand assist as needed  Small bolus volumes  Reduce rate as able (pinch straw to reduce volume and rate with thin liquid intake)     General Precautions: Standard, aspiration    Communication strategies:  narrate cares, single 1 step commands, yes/no questions or choice/contrast as able    Assessment:     Dalila Garcia is a 89 y.o. female with an SLP diagnosis of oropharyngeal dysphagia, characterized by oral holding, prolonged oral manipulation and transit. Multiple swallows per bolus and intermittent cough.    History:   HPI:   Per H&P: "Ms. Dalila Garcia is a 89 y.o. female, with PMH of Dementia, A. Fib, CHF, HTN, who presented to [facility] ED on 12/8/23 due to increased confusion and mumbling for the past 9 hours. Her family reports that normally her speech is clear. She additionally has been restless, and "tossing and turning in bed with her legs up on the wall like she was trying to walk on the wall" per her family's report. They notes that she has been "mouth breathing" for the past week, and when EMS arrived, her O2 sats were reported to be in the 80% range on RA. Other associated symptoms include urinary retention, poor appetite and no intake x 2 days, vomiting after eating. EMS also reported that her SBP was 80, and she was in A. Fib with heart rates no higher than 102 upon arrival to " "the ED. In the ED, she was evaluated with labs, showing ARGENIS with BUN 47 and Cr of 2.6. A CBC showed leukocytosis of 28K, with left shift. A UA showed a nitritie positive UTI with 3+ leukocytes, >100 WBC/hpf, and many bacteria. She was treated in the ED with Rocephin. She was admitted to inpatient status."  Past Medical History:   Diagnosis Date    Anxiety     Arthritis     Cataract     Congestive heart failure     Dementia     Dry eye syndrome     Dry senile macular degeneration     Hypertension     Joint pain     Posterior capsular opacification, both eyes     Psychiatric problem     PVD (peripheral vascular disease)        Past Surgical History:   Procedure Laterality Date    CATARACT EXTRACTION      both eyes    HYSTERECTOMY      TOTAL KNEE ARTHROPLASTY      right       Social History: Patient lives with family.    Prior Intubation HX:  none this admit    Modified Barium Swallow: none per EMR    Chest X-Rays: 12/8/2023  FINDINGS:  AP portable chest radiograph demonstrates a cardiac silhouette within normal limits.  There is tortuosity and ectasia of the descending thoracic aorta.  There is no focal consolidation, pneumothorax, or pleural effusion.     Impression:     No acute intrathoracic abnormality detected.    Prior diet: regular solids/thin liquids a few weeks ago per daughter bedside, overall decline began a few weeks prior with need for transition to soft solids, then eventually liquids only such as broth just prior to admission.    Subjective     Patient seen with daughter and family members present at bedside    Pain/Comfort:   No reported or signs of pain/discomfort during this assessment    Respiratory Status: Room air    Objective:     Brief cognitive/linguistic status  Improved compared to initial assessment 12/9, however, significant deficits persist  Patient awake and alert at beginning of session, however, difficulty sustaining attention to task requiring significant cues throughout entirety of " session  Early onset of lethargy noted, requiring max verbal and tactile cues to sustain alert state for evaluation  Unable to consistently answer yes/no questions or simple open ended questions  Frequently noted are jargon speech responses, with decreased intelligibility noted  Unable to utilize strategies for improved intelligibility such as repetition due to decreased ability to follow commands  Requires max verbal and tactile cues to follow simple 1 step commands this date.     Oral Musculature Evaluation   Face is symmetric at rest and during speech/swallowing tasks  Oral mucosa is dry with dried secretions to labial and lingual surface noted   Lingual protrusion grossly to midline  Labial corners retract symmetrically at bilateral corners during smile  Intermittent dysarthric speech is noted, suspect some lingual weakness  Patient is edentulous  Limited OME due to limited ability to follow commands    Bedside Swallow Eval:   Consistencies Assessed:  Thin liquids ~2 oz thin liquid via singular tsp and strawsip administrations  Puree 1 tsp puree      Oral Phase:   Able to depress mandible and accept bolus without difficulty, adequate labial seal for stripping bolus from utensil, adequate negative intraoral pressure and labial seal to express liquids via straw   No anterior loss across consistencies  Significantly prolonged oral holding noted with puree, for up to 3 mins requiring constant verbal and tactile cues to elicit posterior transit. Patient eventually able to achieve posterior transport given small volume thin liquid wash   Suspect piecemeal swallow as multiple swallows per bolus noted (though, cannot exclude multiple swallows due to attempts to clear pharyngeal stasis or SLN sensory response of airway penetration)  Trace oral stasis post swallow with solids, reduced given liquid wash    Pharyngeal Phase:   Intermittent 2-3 swallows per bolus noted, exacerbated with puree vs thin liquid  One instance of  cough following thin liquid intake (following oral holding of liquids), this was noted in 1/5 trials of thin liquids    Impressions:  Patient with significant oral holding, prolonged oral transit and manipulation exacerbated with puree but also noted with thin liquids. Patient benefits from verbal and tactile cues to elicit posterior transport.  Inability to sustain awake/alert state impacted functional intake this assessment, reviewed with family only providing intake when in alert state  Reviewed with family recommendation of full liquid diet for comfort vs nutrition in setting of transition to home hospice.   Reviewed strategies to promote oral clearance including:  Feeding only when awake/alert, requesting PO   Alternate solids/liquids to promote oropharyngeal clearance  Upright seating during and following intake  Allow patient to self feed when able, providing hand over hand assist as needed  Recommend thin purees or blenderized solids at home, do not recommend chewable solids  Reviewed assessing for hyolaryngeal elevation to ensure swallow has been initiated   Patient at risk for aspiration, reviewed with family oral care to be provided TID as clean oral cavity can prevent aspiration of bacteria and prevent aspiration pna. Discussed need for use of toothbrush despite patient's edentulous status. Provided multiple toothbrushes and mouthwash- encouraged family to dip toothbrush in mouthwash when providing oral care    Goals:   Multidisciplinary Problems       SLP Goals          Problem: SLP    Goal Priority Disciplines Outcome   SLP Goal     SLP Ongoing, Progressing   Description: 1. Pt will be able to sustain wakefulness for 3-5 min  2. Pt will be able to sustain attention to a simple, familiar task for 5 min  3. Pt will be able to consume thin liquids from a straw with no signs of airway threat or aspiration given max assistance for use of volume and rate control  4. Patient will posteriorly transport thin  puree bolus within 60 seconds given max verbal and tactile cues from therapist.                        Plan:     Patient to be seen:  3 x/week, 5 x/week   Plan of Care expires:  12/23/23  Plan of Care reviewed with:  family, patient, other (see comments) (RN, MD)   SLP Follow-Up:  Yes       Discharge recommendations:   no tx indicated at this time upon d/c as patient is transitioning to home hospice  Barriers to Discharge:   Patient is discharging with home hospice, no barriers given support from home hospice care    Time Tracking:     SLP Treatment Date:   12/11/23  Speech Start Time:  1422  Speech Stop Time:  1459     Speech Total Time (min):  37 min    Billable Minutes: Eval Swallow and Oral Function 37    12/11/2023

## 2023-12-11 NOTE — PLAN OF CARE
[facility] provided educational session with daughters at bedside. Daughters proceeded with admission and all consent signed.

## 2023-12-11 NOTE — PLAN OF CARE
Problem: Adult Inpatient Plan of Care  Goal: Plan of Care Review  Outcome: Ongoing, Progressing  Flowsheets (Taken 12/11/2023 1149)  Plan of Care Reviewed With:   patient   daughter   family  Goal: Optimal Comfort and Wellbeing  Outcome: Ongoing, Progressing  Intervention: Monitor Pain and Promote Comfort  Flowsheets (Taken 12/11/2023 1149)  Pain Management Interventions:   care clustered   quiet environment facilitated   relaxation techniques promoted     Problem: Renal Function Impairment (Acute Kidney Injury/Impairment)  Goal: Effective Renal Function  Outcome: Ongoing, Progressing  Intervention: Monitor and Support Renal Function  Flowsheets (Taken 12/11/2023 1149)  Stabilization Measures: legs elevated  Medication Review/Management: medications reviewed     Problem: Skin Injury Risk Increased  Goal: Skin Health and Integrity  Outcome: Ongoing, Progressing  Intervention: Optimize Skin Protection  Flowsheets (Taken 12/11/2023 1149)  Pressure Reduction Techniques:   frequent weight shift encouraged   heels elevated off bed   weight shift assistance provided  Pressure Reduction Devices:   foam padding utilized   heel offloading device utilized   positioning supports utilized  Skin Protection:   adhesive use limited   incontinence pads utilized   tubing/devices free from skin contact   skin sealant/moisture barrier applied   skin-to-device areas padded  Head of Bed (HOB) Positioning: HOB at 30-45 degrees  Intervention: Promote and Optimize Oral Intake  Flowsheets (Taken 12/11/2023 1149)  Oral Nutrition Promotion: safe use of adaptive equipment encouraged     Problem: Fall Injury Risk  Goal: Absence of Fall and Fall-Related Injury  Outcome: Ongoing, Progressing  Intervention: Identify and Manage Contributors  Flowsheets (Taken 12/11/2023 1149)  Self-Care Promotion:   meal set-up provided   safe use of adaptive equipment encouraged  Medication Review/Management: medications reviewed

## 2023-12-11 NOTE — ASSESSMENT & PLAN NOTE
Encephalopathy  Leukocytosis  - Patient was brought in by family for worsening confusion and agitation, and decreased intake for approximately 2 weeks  - Encephalopathy due to urinary tract infection along with marked electrolyte derangements in ARGENIS  - UA consistent with infection  - Continue empiric ceftriaxone 1 g IV Q 24 hours  - Continue IV fluids D5W  - Urine culture was not sent off from the ER, send for culture after abx were administered and may not be helpful  - Blood cultures no growth to date  - Mentation improving and almost back to baseline, WBC trending down and she remains AF  - Speech following and patient to remain NPO except for medications until cleared by Speech therapy  - Will follow up on cultures and continue expectant management  - Repeat labs later today and in a.m.

## 2023-12-11 NOTE — HPI
"Per H&P: "Ms. Dalila Garcia is a 89 y.o. female, with PMH of Dementia, A. Fib, CHF, HTN, who [place] [facility] [place] on 12[place]8/23 due to increased confusion and mumbling for the past 9 hours. Her family reports that normally her speech is clear. She additionally has been restless, and "tossing and turning in bed with her legs up on the wall like she was trying to walk on the wall" per her family's report. They notes that she has been "mouth breathing" for the past week, and when EMS arrived, her O2 sats were reported to be in the 80% range on RA. Other associated symptoms include urinary retention, poor appetite and no intake x 2 days, vomiting after eating. EMS also reported that her SBP was 80, and she was in A. Fib with heart rates no higher than 102 upon arrival to the ED. In the ED, she was evaluated with labs, showing ARGENIS with BUN 47 and Cr of 2.6. A CBC showed leukocytosis of 28K, with left shift. A UA showed a nitritie positive UTI with 3+ leukocytes, >100 WBC/hpf, and many bacteria. She was treated in the ED with Rocephin. She was admitted to inpatient status."    Palliative medicine consulted for assistance with [facility].   "

## 2023-12-11 NOTE — ASSESSMENT & PLAN NOTE
Hypernatremia  Metabolic and lactic acidosis  Hypokalemia  - Baseline creatinine approximately 0.8, last documented 2 weeks ago.  Reported poor intake that started approximately 2 weeks ago  - Creatinine on admission is 2.6, sodium of 147 with a bicarb of 18  - IV fluids with normal saline initiated on admission  - Sodium peak of 151 with creatinine up at 2.9, change IV fluids to D5W at 100 cc/hr on 12/9  - Na level normalized, and creatinine trending down, but potassium level low  - Decreased IV rate to 50 cc/hr and change to D5LR on 12/10  - Will continue rate of IV fluids and supplement with IV KCl  - Will further adjust IV fluids as needed based on laboratory testing results  - Continue strict I&Os and repeat labs in am

## 2023-12-11 NOTE — PLAN OF CARE
Pt remained free of falls and injuries throughout shift. Pt disoriented X4 and not speaking most of the shift but more responsive to questions and a little more cooperative during pt care today. Purposeful hourly rounding performed. Pt received IV Rocephin as ordered, D5 in LR infusing per MAR. Pt bedfast, weight shift assistance provided. Pure wick in place to suction, urine no longer dark and concentrated- yellow-america- and more clear today. VSS on 1L O2 NC. Pt resting in bed, NADN, daughters at bedside. Bed low and locked, bed alarm on, call light in reach. Side rails up x3. Will continue to monitor.

## 2023-12-11 NOTE — CONSULTS
[facility] ([place])  Palliative Medicine  Consult Note    Patient Name: Dalila Garcia  MRN: [id number]  Admission Date: 12/8/2023  Hospital Length of Stay: 3 days  Code Status: Full Code   Attending Provider: Lucille Blackmon MD  Consulting Provider: Jeff Hwang MD  Primary Care Physician: Vazquez Marsh MD  Principal Problem:UTI (urinary tract infection)    Patient information was obtained from relative(s), past medical records, and primary team.      Inpatient consult to Palliative Care  Consult performed by: Jeff Hwang MD  Consult ordered by: Lucille Blackmon MD  Reason for consult: goals of care        Assessment/Plan:     Neuro  Dementia  - noted  - bed bound, very limited communication and diminished PO intake. Seems was in this condition prior to recent infection that only caused worsened confusion and agitation.   - meets for home hospice on above basis (FAST 7c)    Renal/  * UTI (urinary tract infection)  - abx per primary team    Palliative Care  Goals of care, counseling/discussion  12/11/2023:  - chart reviewed in depth  - discussed with primary team and case management  - patient seen at bedside with her daughter (Carolyn) and god daughter at bedside  - patient is seen lying in bed, eyes intermittently open and patient unable to track to voice and unable to participate in conversation  - Caorlyn cares for patient at home on her own  - she shares how difficult things have been  - she has noted her mother progressive decline over the last year, with a more rapid decline since August of this year  - patient is now bedbound, limited communication ability and diminished PO intake the past 1-2 weeks  - she shared that even before the UTI concerns, things were becoming more difficult  - recently patient became much more agitated and restless which led to admission where UTI was discovered and tx begun  - Carolyn shared that she has been trying to get more support at home but nothing has worked  "out  - her hope is to ensure her mother has optimization of support and qol at home for the time she has left  - discussed home hospice and what it entails. Denise stated she was unaware hospice could be done at home like that  - provided her more information regarding hospice philosophy, where it can be done and the support it provides at home  - she was very much receptive to that and open to getting more information  - she feels the extra support and home would be of much assistance  - made them aware of next step in process being case mangement providing list of hospice agencies and them choosing a couple to interview and get more information from. Ultimately then they can decide who they prefer.   - discussed code status and what it entails, to which Denise was not receptive to having that conversation, stating immediately she wishes to maintain full code. She was not interested in delving further into this conversation at this time. Respected her wishes on that front and made her aware that is something hospice can discuss further with her at home too if it is decided on.   - Denise was appreciative of the conversation  - provided emotional support and listening ear. Commended her on her love and care of her mother.   - made nursing aware of patients dry mouth and need for oral care upon my departure  - discussed conversation with primary team and case management to update them regarding wish for hospice information session  [] goals are for optimization and focus on QOL. Next step is for denise to discuss further with case management regarding arrangement of home hospice information sessions to guide next step decision making.   [] dispo per case management and primary team    [place]  - improved from peak, but not quite back to baseline yet  - management/ivf per primary team    Thank you for your consult. We will follow peripherally via chart review.     Subjective:     HPI:   Per H&P: "Ms. Dalila Garcia " "is a 89 y.o. female, with PMH of Dementia, A. Fib, CHF, HTN, who [place] [facility] [place] due to increased confusion and mumbling for the past 9 hours. Her family reports that normally her speech is clear. She additionally has been restless, and "tossing and turning in bed with her legs up on the wall like she was trying to walk on the wall" per her family's report. They notes that she has been "mouth breathing" for the past week, and when EMS arrived, her O2 sats were reported to be in the 80% range on RA. Other associated symptoms include urinary retention, poor appetite and no intake x 2 days, vomiting after eating. EMS also reported that her SBP was 80, and she was in A. Fib with heart rates no higher than 102 upon arrival to the ED. In the ED, she was evaluated with labs, showing ARGENIS with BUN 47 and Cr of 2.6. A CBC showed leukocytosis of 28K, with left shift. A UA showed a nitritie positive UTI with 3+ leukocytes, >100 WBC/hpf, and many bacteria. She was treated in the ED with Rocephin. She was admitted to inpatient status."    Palliative medicine consulted for assistance with [facility].     Hospital Course:  No notes on file      Past Medical History:   Diagnosis Date    Anxiety     Arthritis     Cataract     Congestive heart failure     Dementia     Dry eye syndrome     Dry senile macular degeneration     Hypertension     Joint pain     Posterior capsular opacification, both eyes     Psychiatric problem     PVD (peripheral vascular disease)        Past Surgical History:   Procedure Laterality Date    CATARACT EXTRACTION      both eyes    HYSTERECTOMY      TOTAL KNEE ARTHROPLASTY      right       Review of patient's allergies indicates:   Allergen Reactions    Lotensin [benazepril] Edema    Celexa [citalopram] Other (See Comments)     Hot flashes        Medications:  Continuous Infusions:   dextrose 5% lactated ringers 50 mL/hr at 12/11/23 0616     Scheduled Meds:   amLODIPine  5 mg Oral BID    cefTRIAXone " (ROCEPHIN) IVPB  1 g Intravenous Q24H    heparin (porcine)  5,000 Units Subcutaneous Q8H    miconazole NITRATE 2 %   Topical (Top) BID    OLANZapine  2.5 mg Oral QHS    sodium chloride 0.9%  10 mL Intravenous Q8H     PRN Meds:acetaminophen, labetaloL, melatonin, naloxone, senna-docusate 8.6-50 mg, sodium chloride 0.9%    Family History       Problem Relation (Age of Onset)    Cancer Sister, Brother, Sister, Sister    Cataracts Son    Diabetes Son    Glaucoma Mother          Tobacco Use    Smoking status: Former     Current packs/day: 0.00     Types: Cigarettes     Quit date: 2005     Years since quittin.6    Smokeless tobacco: Never   Substance and Sexual Activity    Alcohol use: No    Drug use: No    Sexual activity: Not Currently       Objective:     Vital Signs (Most Recent):  Temp: 97.5 °F (36.4 °C) (23 0732)  Pulse: 106 (23 1000)  Resp: 16 (23 0732)  BP: (!) 168/87 (23 0755)  SpO2: 100 % (23 0744) Vital Signs (24h Range):  Temp:  [97.2 °F (36.2 °C)-97.6 °F (36.4 °C)] 97.5 °F (36.4 °C)  Pulse:  [] 106  Resp:  [16-18] 16  SpO2:  [98 %-100 %] 100 %  BP: (138-190)/() 168/87     Weight: 64.4 kg (141 lb 15.6 oz)  Body mass index is 22.24 kg/m².       Physical Exam  Vitals and nursing note reviewed.   Constitutional:       Appearance: She is ill-appearing.      Comments: Eyes intermittently open, unable to communicate   HENT:      Mouth/Throat:      Comments: Dry mouth  Eyes:      Comments: Did not track to my voice, maintain eye contact or engage   Pulmonary:      Effort: No respiratory distress.   Neurological:      Comments: Awake, with eyes intermittently open, unable to conversate             Review of Symptoms        Living Arrangements:  Lives with family    Psychosocial/Cultural:   See Palliative Psychosocial Note: Yes  - lives at home with deacon Leon   **Primary  to Follow**  Palliative Care  Consult: No        Advance Care  "Planning  Advance Directives:     Decision Making:  Patient unable to communicate due to disease severity/cognitive impairment and Family answered questions  Goals of Care: The family endorses that what is most important right now is to focus on medical optimization and QOL.     Accordingly, we have decided that the best plan to meet the families goals includes getting more information regarding home hospice.          Significant Labs: reviewed  CBC:   Recent Labs   Lab 12/11/23 0519   WBC 17.51*   HGB 14.5   HCT 42.2   MCV 83        BMP:  Recent Labs   Lab 12/11/23 0519   *      K 2.9*   *   CO2 18*   BUN 48*   CREATININE 1.7*   CALCIUM 8.7   MG 1.8     LFT:  Lab Results   Component Value Date    AST 39 12/08/2023    ALKPHOS 107 12/08/2023    BILITOT 1.5 (H) 12/08/2023     Albumin:   Albumin   Date Value Ref Range Status   12/08/2023 3.2 (L) 3.5 - 5.2 g/dL Final     Protein:   Total Protein   Date Value Ref Range Status   12/08/2023 6.9 6.0 - 8.4 g/dL Final     Lactic acid:   No results found for: "LACTATE"    Significant Imaging: reviewed      I spent a total of 70 minutes on the day of the visit. This includes face to face time in discussion of goals of care, symptom assessment, coordination of care and emotional support.  This also includes non-face to face time preparing to see the patient (eg, review of tests/imaging), obtaining and/or reviewing separately obtained history, documenting clinical information in the electronic or other health record, independently interpreting results and communicating results to the patient/family/caregiver, or care coordinator.    Jeff Hwang MD  Palliative Medicine  [facility] ([place])              "

## 2023-12-11 NOTE — ASSESSMENT & PLAN NOTE
- noted  - bed bound, very limited communication and diminished PO intake. Seems was in this condition prior to recent infection that only caused worsened confusion and agitation.   - meets for home hospice on above basis (FAST 7c)   No

## 2023-12-11 NOTE — PLAN OF CARE
Assessment completed this date. Recommend initiation of full liquid diet (Thin liquids) for comfort in setting of transition to home hospice. Recommend frequent and thorough oral care. Patient at risk for aspiration, please limit volume of sips to singular ~5-10ml sips. If utilizing straw please remove straw or provide straw pinch after each sip. If providing puree from tray please alternate solids/liquids and ensure no oral holding/pocekting   Problem: SLP  Goal: SLP Goal  Description: 1. Pt will be able to sustain wakefulness for 3-5 min  2. Pt will be able to sustain attention to a simple, familiar task for 5 min  3. Pt will be able to consume thin liquids from a straw with no signs of airway threat or aspiration given max assistance for use of volume and rate control  4. Patient will posteriorly transport thin puree bolus within 60 seconds given max verbal and tactile cues from therapist.   Outcome: Ongoing, Progressing

## 2023-12-11 NOTE — PROGRESS NOTES
"[facility] Medicine  Progress Note    Patient Name: Dalila Garcia  MRN: [id number]  Patient Class: IP- Inpatient   Admission Date: 12/8/2023  Length of Stay: 3 days  Attending Physician: Lucille Blackmon MD  Primary Care Provider: Vazquez Marsh MD        Subjective:     Principal Problem:UTI (urinary tract infection)        HPI:  Ms. Dalila Garcia is a 89 y.o. female, with PMH of Dementia, A. Fib, CHF, HTN, who [place] [facility] [place] due to increased confusion and mumbling for the past 9 hours. Her family reports that normally her speech is clear. She additionally has been restless, and "tossing and turning in bed with her legs up on the wall like she was trying to walk on the wall" per her family's report. They notes that she has been "mouth breathing" for the past week, and when EMS arrived, her O2 sats were reported to be in the 80% range on RA. Other associated symptoms include urinary retention, poor appetite and no intake x 2 days, vomiting after eating. EMS also reported that her SBP was 80, and she was in A. Fib with heart rates no higher than 102 upon arrival to the ED. In the ED, she was evaluated with labs, showing ARGENIS with BUN 47 and Cr of 2.6. A CBC showed leukocytosis of 28K, with left shift. A UA showed a nitritie positive UTI with 3+ leukocytes, >100 WBC/hpf, and many bacteria. She was treated in the ED with Rocephin. She was admitted to inpatient status.     Overview/Hospital Course:  Ms. Garcia presented with increased confusion and decreased p.o. intake.  Admitted with encephalopathy with UTI and dehydration.  Empirically started on Rocephin and treatment initiated with IV fluids.    Interval History:  No acute events, patient alert and saying few words. Daughter at the bedside report she stated she was hungry. Patient is bedbound at baseline. Update given to daughter.    Review of Systems   Unable to perform ROS: Dementia     Objective:     Vital Signs " (Most Recent):  Temp: 97.8 °F (36.6 °C) (12/11/23 1152)  Pulse: 97 (12/11/23 1205)  Resp: 16 (12/11/23 1152)  BP: (!) 170/99 (12/11/23 1205)  SpO2: 100 % (12/11/23 1152) Vital Signs (24h Range):  Temp:  [97.2 °F (36.2 °C)-97.8 °F (36.6 °C)] 97.8 °F (36.6 °C)  Pulse:  [] 97  Resp:  [16-18] 16  SpO2:  [98 %-100 %] 100 %  BP: (138-190)/() 170/99     Weight: 64.4 kg (141 lb 15.6 oz)  Body mass index is 22.24 kg/m².    Intake/Output Summary (Last 24 hours) at 12/11/2023 1422  Last data filed at 12/11/2023 0612  Gross per 24 hour   Intake 1088.23 ml   Output 600 ml   Net 488.23 ml           Physical Exam  Vitals and nursing note reviewed.   Constitutional:       Comments: Chronically ill appearing   HENT:      Head: Atraumatic.      Comments: Temporal wasting     Nose: Nose normal.      Mouth/Throat:      Mouth: Mucous membranes are dry.   Eyes:      Conjunctiva/sclera: Conjunctivae normal.   Cardiovascular:      Rate and Rhythm: Normal rate and regular rhythm.      Heart sounds: Murmur heard.   Pulmonary:      Effort: Pulmonary effort is normal. No respiratory distress.   Abdominal:      General: Bowel sounds are normal. There is no distension.      Palpations: Abdomen is soft.      Tenderness: There is no abdominal tenderness. There is no guarding or rebound.   Musculoskeletal:         General: Normal range of motion.      Cervical back: Normal range of motion.      Right lower leg: No edema.      Left lower leg: No edema.   Skin:     General: Skin is dry.      Capillary Refill: Capillary refill takes 2 to 3 seconds.   Neurological:      Mental Status: She is alert. She is disoriented.      Comments: Awake, alert, able to answer few simple questions with 1-2 words, moving all extremities and moving around in the bed           Significant Labs: All pertinent labs within the past 24 hours have been reviewed.    Significant Imaging: I have reviewed all pertinent imaging results/findings within the past 24  hours.    Assessment/Plan:      * UTI (urinary tract infection)  Encephalopathy  Leukocytosis  - Patient was brought in by family for worsening confusion and agitation, and decreased intake for approximately 2 weeks  - Encephalopathy due to urinary tract infection along with marked electrolyte derangements in ARGENIS  - UA consistent with infection  - Continue empiric ceftriaxone 1 g IV Q 24 hours  - Continue IV fluids D5W  - Urine culture was not sent off from the ER, send for culture after abx were administered and may not be helpful  - Blood cultures no growth to date  - Mentation improving and almost back to baseline, WBC trending down and she remains AF  - Speech following and patient to remain NPO except for medications until cleared by Speech therapy  - Will follow up on cultures and continue expectant management  - Repeat labs later today and in a.m.    Acute renal failure superimposed on stage 2 chronic kidney disease  Hypernatremia  Metabolic and lactic acidosis  Hypokalemia  - Baseline creatinine approximately 0.8, last documented 2 weeks ago.  Reported poor intake that started approximately 2 weeks ago  - Creatinine on admission is 2.6, sodium of 147 with a bicarb of 18  - IV fluids with normal saline initiated on admission  - Sodium peak of 151 with creatinine up at 2.9, change IV fluids to D5W at 100 cc/hr on 12/9  - Na level normalized, and creatinine trending down, but potassium level low  - Decreased IV rate to 50 cc/hr and change to D5LR on 12/10  - Will continue rate of IV fluids and supplement with IV KCl  - Will further adjust IV fluids as needed based on laboratory testing results  - Continue strict I&Os and repeat labs in am    Severe malnutrition  - Nutrition consulted. Most recent weight and BMI monitored  - Will advance diet when cleared by speech therapy    Dementia  Debility  - Family reports a noticeable decline starting August of this year  - Progressive dementia explained and goals of care  addressed, patient is a full code  - Palliative Care will be consulted on Monday for further goals of care discussions, and education and supportive of family    Infectious encephalopathy  - As discussed above  - Delirium precautions ordered, monitor     Atrial fibrillation  - [id number] Score: 3. Not currently on anticoagulation.   - Rate is controlled    Essential hypertension  - Chronic, and controlled  - Continue amlodipine    Congestive heart failure  - Patient is currently compensated, and clinically dry  - BNP of 216 as expected and last echo showed EF of 60-65% in May 2018  - Chest X ray unremarkable and without signs of volume overload  - Continue monitor volume status while patient is getting judicious IV fluids      VTE Risk Mitigation (From admission, onward)           Ordered     heparin (porcine) injection 5,000 Units  Every 8 hours         12/08/23 2234     IP VTE HIGH RISK PATIENT  Once         12/08/23 2234     Place sequential compression device  Until discontinued         12/08/23 2234     Place sequential compression device  Until discontinued         12/08/23 2144                        Lucille Blackmon MD  Department of Hospital Medicine   [facility] ([place])

## 2023-12-11 NOTE — ASSESSMENT & PLAN NOTE
- Nutrition consulted. Most recent weight and BMI monitored  - Will advance diet when cleared by speech therapy

## 2023-12-12 LAB
ANION GAP SERPL CALC-SCNC: 13 MMOL/L (ref 8–16)
BASOPHILS # BLD AUTO: 0.02 K/UL (ref 0–0.2)
BASOPHILS NFR BLD: 0.2 % (ref 0–1.9)
BUN SERPL-MCNC: 42 MG/DL (ref 10–30)
CALCIUM SERPL-MCNC: 8.8 MG/DL (ref 8.7–10.5)
CHLORIDE SERPL-SCNC: 114 MMOL/L (ref 95–110)
CO2 SERPL-SCNC: 18 MMOL/L (ref 23–29)
CREAT SERPL-MCNC: 1.5 MG/DL (ref 0.5–1.4)
DIFFERENTIAL METHOD: ABNORMAL
EOSINOPHIL # BLD AUTO: 0 K/UL (ref 0–0.5)
EOSINOPHIL NFR BLD: 0.3 % (ref 0–8)
ERYTHROCYTE [DISTWIDTH] IN BLOOD BY AUTOMATED COUNT: 16.6 % (ref 11.5–14.5)
EST. GFR  (NO RACE VARIABLE): 32 ML/MIN/1.73 M^2
GLUCOSE SERPL-MCNC: 116 MG/DL (ref 70–110)
HCT VFR BLD AUTO: 43 % (ref 37–48.5)
HGB BLD-MCNC: 14.8 G/DL (ref 12–16)
IMM GRANULOCYTES # BLD AUTO: 0.12 K/UL (ref 0–0.04)
IMM GRANULOCYTES NFR BLD AUTO: 1.1 % (ref 0–0.5)
LYMPHOCYTES # BLD AUTO: 0.6 K/UL (ref 1–4.8)
LYMPHOCYTES NFR BLD: 5.9 % (ref 18–48)
MAGNESIUM SERPL-MCNC: 1.8 MG/DL (ref 1.6–2.6)
MCH RBC QN AUTO: 28.6 PG (ref 27–31)
MCHC RBC AUTO-ENTMCNC: 34.4 G/DL (ref 32–36)
MCV RBC AUTO: 83 FL (ref 82–98)
MONOCYTES # BLD AUTO: 0.4 K/UL (ref 0.3–1)
MONOCYTES NFR BLD: 3.7 % (ref 4–15)
NEUTROPHILS # BLD AUTO: 9.5 K/UL (ref 1.8–7.7)
NEUTROPHILS NFR BLD: 88.8 % (ref 38–73)
NRBC BLD-RTO: 0 /100 WBC
PHOSPHATE SERPL-MCNC: 1.6 MG/DL (ref 2.7–4.5)
PLATELET # BLD AUTO: 162 K/UL (ref 150–450)
PMV BLD AUTO: 11 FL (ref 9.2–12.9)
POTASSIUM SERPL-SCNC: 3.3 MMOL/L (ref 3.5–5.1)
RBC # BLD AUTO: 5.18 M/UL (ref 4–5.4)
SODIUM SERPL-SCNC: 145 MMOL/L (ref 136–145)
WBC # BLD AUTO: 10.72 K/UL (ref 3.9–12.7)

## 2023-12-12 PROCEDURE — 36415 COLL VENOUS BLD VENIPUNCTURE: CPT | Performed by: HOSPITALIST

## 2023-12-12 PROCEDURE — 63600175 PHARM REV CODE 636 W HCPCS: Performed by: PHYSICIAN ASSISTANT

## 2023-12-12 PROCEDURE — 25000003 PHARM REV CODE 250: Performed by: PHYSICIAN ASSISTANT

## 2023-12-12 PROCEDURE — 92526 ORAL FUNCTION THERAPY: CPT

## 2023-12-12 PROCEDURE — 80048 BASIC METABOLIC PNL TOTAL CA: CPT | Performed by: HOSPITALIST

## 2023-12-12 PROCEDURE — 99233 SBSQ HOSP IP/OBS HIGH 50: CPT | Mod: ,,, | Performed by: STUDENT IN AN ORGANIZED HEALTH CARE EDUCATION/TRAINING PROGRAM

## 2023-12-12 PROCEDURE — 25000003 PHARM REV CODE 250: Performed by: HOSPITALIST

## 2023-12-12 PROCEDURE — 99233 PR SUBSEQUENT HOSPITAL CARE,LEVL III: ICD-10-PCS | Mod: ,,, | Performed by: STUDENT IN AN ORGANIZED HEALTH CARE EDUCATION/TRAINING PROGRAM

## 2023-12-12 PROCEDURE — 83735 ASSAY OF MAGNESIUM: CPT | Performed by: HOSPITALIST

## 2023-12-12 PROCEDURE — 85025 COMPLETE CBC W/AUTO DIFF WBC: CPT | Performed by: HOSPITALIST

## 2023-12-12 PROCEDURE — 21400001 HC TELEMETRY ROOM

## 2023-12-12 PROCEDURE — 63600175 PHARM REV CODE 636 W HCPCS: Performed by: HOSPITALIST

## 2023-12-12 PROCEDURE — 84100 ASSAY OF PHOSPHORUS: CPT | Performed by: HOSPITALIST

## 2023-12-12 PROCEDURE — A4216 STERILE WATER/SALINE, 10 ML: HCPCS | Performed by: PHYSICIAN ASSISTANT

## 2023-12-12 RX ADMIN — POTASSIUM PHOSPHATE, MONOBASIC POTASSIUM PHOSPHATE, DIBASIC 30 MMOL: 224; 236 INJECTION, SOLUTION, CONCENTRATE INTRAVENOUS at 10:12

## 2023-12-12 RX ADMIN — MICONAZOLE NITRATE: 20 POWDER TOPICAL at 10:12

## 2023-12-12 RX ADMIN — HEPARIN SODIUM 5000 UNITS: 5000 INJECTION INTRAVENOUS; SUBCUTANEOUS at 10:12

## 2023-12-12 RX ADMIN — HEPARIN SODIUM 5000 UNITS: 5000 INJECTION INTRAVENOUS; SUBCUTANEOUS at 06:12

## 2023-12-12 RX ADMIN — CEFTRIAXONE SODIUM 1 G: 1 INJECTION, POWDER, FOR SOLUTION INTRAMUSCULAR; INTRAVENOUS at 08:12

## 2023-12-12 RX ADMIN — Medication 10 ML: at 06:12

## 2023-12-12 RX ADMIN — MICONAZOLE NITRATE: 20 POWDER TOPICAL at 09:12

## 2023-12-12 RX ADMIN — AMLODIPINE BESYLATE 5 MG: 5 TABLET ORAL at 10:12

## 2023-12-12 RX ADMIN — SODIUM CHLORIDE, SODIUM LACTATE, POTASSIUM CHLORIDE, CALCIUM CHLORIDE AND DEXTROSE MONOHYDRATE: 5; 600; 310; 30; 20 INJECTION, SOLUTION INTRAVENOUS at 06:12

## 2023-12-12 RX ADMIN — Medication 10 ML: at 10:12

## 2023-12-12 RX ADMIN — LABETALOL HYDROCHLORIDE 10 MG: 5 INJECTION INTRAVENOUS at 05:12

## 2023-12-12 RX ADMIN — HEPARIN SODIUM 5000 UNITS: 5000 INJECTION INTRAVENOUS; SUBCUTANEOUS at 03:12

## 2023-12-12 RX ADMIN — OLANZAPINE 2.5 MG: 2.5 TABLET, FILM COATED ORAL at 09:12

## 2023-12-12 RX ADMIN — Medication 10 ML: at 02:12

## 2023-12-12 RX ADMIN — AMLODIPINE BESYLATE 5 MG: 5 TABLET ORAL at 09:12

## 2023-12-12 NOTE — SUBJECTIVE & OBJECTIVE
Interval History:  No acute events, patient alert and saying few words. Daughter at the bedside report this is her baseline. She looks well.    Review of Systems   Unable to perform ROS: Dementia     Objective:     Vital Signs (Most Recent):  Temp: 97.5 °F (36.4 °C) (12/12/23 1707)  Pulse: 85 (12/12/23 1707)  Resp: 16 (12/12/23 1707)  BP: (!) 193/131 (12/12/23 1715)  SpO2: 99 % (12/12/23 1707) Vital Signs (24h Range):  Temp:  [97.5 °F (36.4 °C)-97.8 °F (36.6 °C)] 97.5 °F (36.4 °C)  Pulse:  [] 85  Resp:  [16-20] 16  SpO2:  [92 %-100 %] 99 %  BP: (107-202)/() 193/131     Weight: 68.8 kg (151 lb 10.8 oz)  Body mass index is 23.76 kg/m².  No intake or output data in the 24 hours ending 12/12/23 1722        Physical Exam  Vitals and nursing note reviewed.   Constitutional:       Comments: Chronically ill appearing   HENT:      Head: Atraumatic.      Comments: Temporal wasting     Nose: Nose normal.   Eyes:      Conjunctiva/sclera: Conjunctivae normal.   Cardiovascular:      Rate and Rhythm: Normal rate and regular rhythm.      Heart sounds: Murmur heard.   Pulmonary:      Effort: Pulmonary effort is normal. No respiratory distress.   Abdominal:      General: Bowel sounds are normal. There is no distension.      Palpations: Abdomen is soft.      Tenderness: There is no abdominal tenderness. There is no guarding or rebound.   Musculoskeletal:         General: Normal range of motion.      Cervical back: Normal range of motion.      Right lower leg: No edema.      Left lower leg: No edema.   Skin:     General: Skin is dry.      Capillary Refill: Capillary refill takes 2 to 3 seconds.   Neurological:      Mental Status: She is alert. She is disoriented.      Comments: Awake, alert, able to answer few simple questions with 1-2 words, moving all extremities and moving around in the bed           Significant Labs: All pertinent labs within the past 24 hours have been reviewed.    Significant Imaging: I have reviewed  all pertinent imaging results/findings within the past 24 hours.

## 2023-12-12 NOTE — PROGRESS NOTES
[facility] ([place])  Adult Nutrition  Progress Note    SUMMARY       Recommendations  1) Commercial beverages to support PO intake    2) Monitor BM regimen r/t constipation        Last Bowel Movement: 11/25/23  3) RD to monitor and follow up    Goals:   Patient to tolerate >/= 50% intake of meals by RD follow up  Nutrition Goal Status: new  Communication of RD Recs:  (POC)    Assessment and Plan    Endocrine  Severe malnutrition  Malnutrition Type:  Context: acute illness or injury, chronic illness  Level: severe    Related to (etiology):   Decreased appetite   AMS, dementia   CKD  Infection    Signs and Symptoms (as evidenced by):   Decreased po intake   Weight loss     Malnutrition Characteristic Summary:  Weight Loss (Malnutrition): greater than 5% in 1 month  Energy Intake (Malnutrition): less than or equal to 75% for greater than or equal to 1 month  Subcutaneous Fat (Malnutrition): moderate depletion  Muscle Mass (Malnutrition): moderate depletion      Interventions (treatment strategy):  Commercial beverages   Collaboration with other providers    Nutrition Diagnosis Status:   Continues         Malnutrition Assessment  Malnutrition Context: acute illness or injury, chronic illness  Malnutrition Level: severe      Micronutrient Evaluation Summary: suspected deficiency   Weight Loss (Malnutrition): greater than 5% in 1 month  Energy Intake (Malnutrition): less than or equal to 75% for greater than or equal to 1 month  Subcutaneous Fat (Malnutrition): moderate depletion  Muscle Mass (Malnutrition): moderate depletion   Orbital Region (Subcutaneous Fat Loss): mild depletion  Upper Arm Region (Subcutaneous Fat Loss): moderate depletion   [place] Region (Muscle Loss): moderate depletion  Clavicle Bone Region (Muscle Loss): moderate depletion  Dorsal Hand (Muscle Loss): moderate depletion                 Reason for Assessment    Reason For Assessment: RD follow-up  Diagnosis: infection/sepsis  Relevant Medical  "History:   Patient Active Problem List   Diagnosis    Congestive heart failure    Essential hypertension    Constipation    Dizziness    Atherosclerosis of aorta    Atrial fibrillation    Peripheral edema    UTI (urinary tract infection)    Acute renal failure superimposed on stage 2 chronic kidney disease    Infectious encephalopathy    Dementia    Severe malnutrition    Goals of care, counseling/discussion     Interdisciplinary Rounds: did not attend  General Information Comments: Patient and daughter at bedside. Receiving a full liquid diet. Was given vegetable soup last night, daughter reports spitting out soup. Tolerated a couple spoon fulls. Patient with AMS, bedbound. D5 in lactated ringers infusing @ 50 mL/hr. Daughter reports she provides Ensure at home. RD to add to current diet order. Plan to dc with home hospice. Medical chart weight history show weight loss from 72.6kg within 1 month (significant). PIV. [place]ee[place] 11- skin intact. Patient +PCM d/t diminished appetite, difficulty chewing/swallowing, weight loss.  Nutrition Discharge Planning: Pending medical course    Nutrition Risk Screen    Nutrition Risk Screen: difficulty chewing/swallowing, reduced oral intake over the last month, unintentional loss of 10 lbs or more in the past 2 months    Nutrition/Diet History    Spiritual, Cultural Beliefs, Jewish Practices, Values that Affect Care: no  Food Allergies: NKFA  Factors Affecting Nutritional Intake: decreased appetite, difficulty/impaired swallowing, chewing difficulties/inability to chew food, impaired cognitive status/motor control, inability to feed self    Anthropometrics    Temp: 97.8 °F (36.6 °C)  Height Method: Stated  Height: 5' 7" (170.2 cm)  Height (inches): 67 in  Weight Method: Bed Scale  Weight: 68.8 kg (151 lb 10.8 oz)  Weight (lb): 151.68 lb  Ideal Body Weight (IBW), Female: 135 lb  % Ideal Body Weight, Female (lb): 112.36 %  BMI (Calculated): 23.8  BMI Grade: 18.5-24.9 - " normal  Weight Loss: unintentional  Usual Body Weight (UBW), k.6 kg (within 1 month 2023)  % Usual Body Weight: 94.96  % Weight Change From Usual Weight: -5.23 %       Wt Readings from Last 10 Encounters:   23 68.8 kg (151 lb 10.8 oz)   23 72.6 kg (160 lb)   23 84.9 kg (187 lb 2.7 oz)   22 86.5 kg (190 lb 11.2 oz)   19 82.4 kg (181 lb 10.5 oz)   19 81 kg (178 lb 9.2 oz)   18 80.6 kg (177 lb 11.1 oz)   10/02/17 79.1 kg (174 lb 6.1 oz)   17 79.3 kg (174 lb 13.2 oz)   17 79.4 kg (175 lb 0.7 oz)     Lab/Procedures/Meds    Pertinent Labs Reviewed: reviewed  CBC:  Recent Labs   Lab 23  0657   WBC 10.72   HGB 14.8   HCT 43.0        CMP:  Recent Labs   Lab 23  0657   CALCIUM 8.8      K 3.3*   CO2 18*   *   BUN 42*   CREATININE 1.5*     Pertinent Medications Reviewed: reviewed  Scheduled Meds:   amLODIPine  5 mg Oral BID    cefTRIAXone (ROCEPHIN) IVPB  1 g Intravenous Q24H    heparin (porcine)  5,000 Units Subcutaneous Q8H    miconazole NITRATE 2 %   Topical (Top) BID    OLANZapine  2.5 mg Oral QHS    potassium phosphate IVPB  30 mmol Intravenous Once    sodium chloride 0.9%  10 mL Intravenous Q8H     Continuous Infusions:   dextrose 5% lactated ringers 50 mL/hr at 23 0609     PRN Meds:.acetaminophen, labetaloL, melatonin, naloxone, senna-docusate 8.6-50 mg, sodium chloride 0.9%      Estimated/Assessed Needs    Weight Used For Calorie Calculations: 68.8 kg (151 lb 10.8 oz)  Energy Calorie Requirements (kcal): 25-30 kcal/kg  Energy Need Method: Kcal/kg (3899-6009)  Protein Requirements: 69g (1.0 g/kg)  Weight Used For Protein Calculations: 68.8 kg (151 lb 10.8 oz)  Fluid Requirements (mL): 1000+output or per md order  Estimated Fluid Requirement Method: other (see comments) (CKD, +malnutrition)  RDA Method (mL): 25  CHO Requirement: 250      Nutrition Prescription Ordered    Current Diet Order: Full Liquid Diet    Evaluation of  Received Nutrient/Fluid Intake    Parenteral Calories (kcal): 204  % Kcal Needs: < 25%  % Protein Needs: < 25%  IV Fluid (mL): 1200  I/O: + 2.9 L since admit  Energy Calories Required: not meeting needs  Protein Required: not meeting needs  Fluid Required: meeting needs  Comments: LBM: 11/25/23  Tolerance: not tolerating  % Intake of Estimated Energy Needs: 0 - 25 %  % Meal Intake: 0 - 25 %    Intake/Output - Last 3 Shifts         12/10 0700 12/11 0659 12/11 0700 12/12 0659 12/12 0700 12/13 0659    P.O. 0      I.V. (mL/kg) 1890.9 (29.4)      IV Piggyback 196.1      Total Intake(mL/kg) 2087 (32.4)      Urine (mL/kg/hr) 720 (0.5)      Emesis/NG output 0      Other 0      Stool 0      Total Output 720      Net +1367             Urine Occurrence 1 x      Stool Occurrence 0 x      Emesis Occurrence 0 x              Nutrition Risk    Level of Risk/Frequency of Follow-up: moderate - high (follow up: 2x per week)     Monitor and Evaluation    Food and Nutrient Intake: energy intake, food and beverage intake  Food and Nutrient Adminstration: diet order  Knowledge/Beliefs/Attitudes: beliefs and attitudes  Physical Activity and Function: nutrition-related ADLs and IADLs, factors affecting access to physical activity  Anthropometric Measurements: height/length, weight, weight change, body mass index  Biochemical Data, Medical Tests and Procedures: electrolyte and renal panel, gastrointestinal profile, glucose/endocrine profile, inflammatory profile, lipid profile     Nutrition Follow-Up    RD Follow-up?: Yes    Jesenia Ferrer RDN, JOSÉ

## 2023-12-12 NOTE — PLAN OF CARE
Recommendations  1) Commercial beverages to support PO intake    2) Monitor BM regimen r/t constipation        Last Bowel Movement: 11/25/23  3) RD to monitor and follow up    Goals:   Patient to tolerate >/= 50% intake of meals by RD follow up  Nutrition Goal Status: new  Communication of RD Recs:  (POC)

## 2023-12-12 NOTE — PROGRESS NOTES
"[facility] Medicine  Progress Note    Patient Name: Dalila Garcia  MRN: [id number]  Patient Class: IP- Inpatient   Admission Date: 12/8/2023  Length of Stay: 4 days  Attending Physician: Lucille Blackmon MD  Primary Care Provider: Vazquez Marsh MD        Subjective:     Principal Problem:UTI (urinary tract infection)        HPI:  Ms. Dalila Garcia is a 89 y.o. female, with PMH of Dementia, A. Fib, CHF, HTN, who [place] [facility] [place] due to increased confusion and mumbling for the past 9 hours. Her family reports that normally her speech is clear. She additionally has been restless, and "tossing and turning in bed with her legs up on the wall like she was trying to walk on the wall" per her family's report. They notes that she has been "mouth breathing" for the past week, and when EMS arrived, her O2 sats were reported to be in the 80% range on RA. Other associated symptoms include urinary retention, poor appetite and no intake x 2 days, vomiting after eating. EMS also reported that her SBP was 80, and she was in A. Fib with heart rates no higher than 102 upon arrival to the ED. In the ED, she was evaluated with labs, showing ARGENIS with BUN 47 and Cr of 2.6. A CBC showed leukocytosis of 28K, with left shift. A UA showed a nitritie positive UTI with 3+ leukocytes, >100 WBC/hpf, and many bacteria. She was treated in the ED with Rocephin. She was admitted to inpatient status.     Overview/Hospital Course:  Ms. Garcia presented with increased confusion and decreased p.o. intake.  Admitted with encephalopathy with UTI and dehydration.  Empirically started on Rocephin and treatment initiated with IV fluids.    Interval History:  No acute events, patient alert and saying few words. Daughter at the bedside report this is her baseline. She looks well.    Review of Systems   Unable to perform ROS: Dementia     Objective:     Vital Signs (Most Recent):  Temp: 97.5 °F (36.4 °C) (12/12/23 " 1707)  Pulse: 85 (12/12/23 1707)  Resp: 16 (12/12/23 1707)  BP: (!) 193/131 (12/12/23 1715)  SpO2: 99 % (12/12/23 1707) Vital Signs (24h Range):  Temp:  [97.5 °F (36.4 °C)-97.8 °F (36.6 °C)] 97.5 °F (36.4 °C)  Pulse:  [] 85  Resp:  [16-20] 16  SpO2:  [92 %-100 %] 99 %  BP: (107-202)/() 193/131     Weight: 68.8 kg (151 lb 10.8 oz)  Body mass index is 23.76 kg/m².  No intake or output data in the 24 hours ending 12/12/23 1722        Physical Exam  Vitals and nursing note reviewed.   Constitutional:       Comments: Chronically ill appearing   HENT:      Head: Atraumatic.      Comments: Temporal wasting     Nose: Nose normal.   Eyes:      Conjunctiva/sclera: Conjunctivae normal.   Cardiovascular:      Rate and Rhythm: Normal rate and regular rhythm.      Heart sounds: Murmur heard.   Pulmonary:      Effort: Pulmonary effort is normal. No respiratory distress.   Abdominal:      General: Bowel sounds are normal. There is no distension.      Palpations: Abdomen is soft.      Tenderness: There is no abdominal tenderness. There is no guarding or rebound.   Musculoskeletal:         General: Normal range of motion.      Cervical back: Normal range of motion.      Right lower leg: No edema.      Left lower leg: No edema.   Skin:     General: Skin is dry.      Capillary Refill: Capillary refill takes 2 to 3 seconds.   Neurological:      Mental Status: She is alert. She is disoriented.      Comments: Awake, alert, able to answer few simple questions with 1-2 words, moving all extremities and moving around in the bed           Significant Labs: All pertinent labs within the past 24 hours have been reviewed.    Significant Imaging: I have reviewed all pertinent imaging results/findings within the past 24 hours.    Assessment/Plan:      * UTI (urinary tract infection)  Encephalopathy  Leukocytosis  - Patient was brought in by family for worsening confusion and agitation, and decreased intake for approximately 2 weeks  -  Encephalopathy due to urinary tract infection along with marked electrolyte derangements in ARGENIS  - UA consistent with infection  - Continue empiric ceftriaxone 1 g IV Q 24 hours  - Continue IV fluids D5W  - Urine culture was not sent off from the ER, send for culture after abx were administered and may not be helpful and it showed multiple organisms with none in predominance  - Blood cultures no growth to date  - Mentation improved back to baseline, WBC now normal and she remains AF  - Speech following and patient advanced to full liquids  - Patient to complete course of treatment with Rocephin  - Repeat labs later today and in a.m.    Acute renal failure superimposed on stage 2 chronic kidney disease  Hypernatremia  Metabolic and lactic acidosis  Hypokalemia  Hypophosphatemia  - Baseline creatinine approximately 0.8, last documented 2 weeks ago.  Reported poor intake that started approximately 2 weeks ago  - Creatinine on admission is 2.6, sodium of 147 with a bicarb of 18  - IV fluids with normal saline initiated on admission  - Sodium peak of 151 with creatinine up at 2.9, change IV fluids to D5W at 100 cc/hr on 12/9  - Na level normalized, and creatinine trending down, but potassium level low  - Decreased IV rate to 50 cc/hr and change to D5LR on 12/10  - Will continue rate of IV fluids and supplement with IV KPhos  - Will further adjust IV fluids as needed based on laboratory testing results  - Renal function almost fully normalized  - Continue strict I&Os and repeat labs in am    Severe malnutrition  - Nutrition consulted. Most recent weight and BMI monitored  - Advanced to full liquids    Dementia  Debility  - Family reports a noticeable decline starting August of this year  - Progressive dementia explained and goals of care addressed, patient is a full code  - Palliative Care input appreciated, and education and supportive of family done  - Plan for discharge with home hospice    Infectious encephalopathy  -  As discussed above  - Delirium precautions ordered, monitor     Atrial fibrillation  - [id number] Score: 3. Not currently on anticoagulation.   - Rate is controlled    Essential hypertension  - Chronic, and controlled  - Continue amlodipine    Congestive heart failure  - Patient is currently compensated, and clinically dry  - BNP of 216 as expected and last echo showed EF of 60-65% in May 2018  - Chest X ray unremarkable and without signs of volume overload  - Continue monitor volume status while patient is getting judicious IV fluids      VTE Risk Mitigation (From admission, onward)           Ordered     heparin (porcine) injection 5,000 Units  Every 8 hours         12/08/23 2234     IP VTE HIGH RISK PATIENT  Once         12/08/23 2234     Place sequential compression device  Until discontinued         12/08/23 2234     Place sequential compression device  Until discontinued         12/08/23 2144                      Lucille Blackmon MD  Department of Hospital Medicine   [facility] Med Surg ([place])

## 2023-12-12 NOTE — PROGRESS NOTES
[facility] ([place])  Palliative Medicine  Progress Note    Patient Name: Dalila Garcia  MRN: [id number]  Admission Date: 12/8/2023  Hospital Length of Stay: 4 days  Code Status: Full Code   Attending Provider: Lucille Blackmon MD  Consulting Provider: Jeff Hwang MD  Primary Care Physician: Vazquez Marsh MD  Principal Problem:UTI (urinary tract infection)    Patient information was obtained from relative(s), past medical records, and primary team.      Assessment/Plan:     Neuro  Dementia  - noted  - bed bound, very limited communication and diminished PO intake. Seems was in this condition prior to recent infection that only caused worsened confusion and agitation.   - meets for home hospice on above basis (FAST 7c)  - plan for discharge home with hospice    Renal/  * UTI (urinary tract infection)  - abx per primary team    Palliative Care  Goals of care, counseling/discussion  12/12/2023:  - chart/interval history reviewed in depth  - patient seen at bedside. Her daughter Carolyn and god daughter present at bedside  - patient sleeping, resting comfortably. Did not awake to gentle stimuli. Eyes remained closed  - Carolyn shared that patient is more comfortable today and appears to be steadily returning back to her baseline as she seems less agitated overall. At baseline patient is restless to an extent.   - Carolyn shared that plan is clear for home with hospice. She asked all the questions she had  - she was grateful for being made aware of that possibility for more support at home  - we talked through the importance of allowing patient to lead in terms of her PO intake needs. Carolyn stated her agreement  - discussed the difficulty that given patients dementia, she is unable to convey her needs. Carolyn will encourage/offer to patient but if patient is not interested then will not over do it.  - Carolyn very appreciative of the care and support  [] plan for discharge home with hospice, arranged by  "case management  [] discharge timing per primary team/case management once patient is appropriately optimized as possible    [place]  - improved from peak, but not quite back to baseline yet  - management/ivf per primary team    I will sign off. Please contact us if you have any additional questions.    Subjective:     Chief Complaint:   Chief Complaint   Patient presents with    Altered Mental Status     Family activated EMS for pt more confused than baseline, hx of dementia. Pt initial O2 sat 80% on RA, currently in Afib , with initial SBP 80       HPI:   Per H&P: "Ms. Dalila Garcia is a 89 y.o. female, with PMH of Dementia, A. Fib, CHF, HTN, who presented to [facility] ED on 12/8/23 due to increased confusion and mumbling for the past 9 hours. Her family reports that normally her speech is clear. She additionally has been restless, and "tossing and turning in bed with her legs up on the wall like she was trying to walk on the wall" per her family's report. They notes that she has been "mouth breathing" for the past week, and when EMS arrived, her O2 sats were reported to be in the 80% range on RA. Other associated symptoms include urinary retention, poor appetite and no intake x 2 days, vomiting after eating. EMS also reported that her SBP was 80, and she was in A. Fib with heart rates no higher than 102 upon arrival to the ED. In the ED, she was evaluated with labs, showing MICH with BUN 47 and Cr of 2.6. A CBC showed leukocytosis of 28K, with left shift. A UA showed a nitritie positive UTI with 3+ leukocytes, >100 WBC/hpf, and many bacteria. She was treated in the ED with Rocephin. She was admitted to inpatient status."    Palliative medicine consulted for assistance with GOC.     Hospital Course:  No notes on file      Medications:  Continuous Infusions:   dextrose 5% lactated ringers 50 mL/hr at 12/12/23 0609     Scheduled Meds:   amLODIPine  5 mg Oral BID    cefTRIAXone (ROCEPHIN) IVPB  1 g Intravenous Q24H    " heparin (porcine)  5,000 Units Subcutaneous Q8H    miconazole NITRATE 2 %   Topical (Top) BID    OLANZapine  2.5 mg Oral QHS    potassium phosphate IVPB  30 mmol Intravenous Once    sodium chloride 0.9%  10 mL Intravenous Q8H     PRN Meds:acetaminophen, labetaloL, melatonin, naloxone, senna-docusate 8.6-50 mg, sodium chloride 0.9%    Objective:     Vital Signs (Most Recent):  Temp: 97.8 °F (36.6 °C) (12/12/23 1134)  Pulse: 91 (12/12/23 1134)  Resp: 16 (12/12/23 1134)  BP: (!) 201/118 (12/12/23 1134)  SpO2: 100 % (12/12/23 1134) Vital Signs (24h Range):  Temp:  [97.5 °F (36.4 °C)-97.8 °F (36.6 °C)] 97.8 °F (36.6 °C)  Pulse:  [] 91  Resp:  [16-20] 16  SpO2:  [92 %-100 %] 100 %  BP: (107-202)/() 201/118     Weight: 68.8 kg (151 lb 10.8 oz)  Body mass index is 23.76 kg/m².       Physical Exam  Vitals and nursing note reviewed.   Constitutional:       General: She is not in acute distress.     Comments: Chronically ill appearing. sleeping   Eyes:      Comments: Eyes closed during visit   Pulmonary:      Effort: No respiratory distress.   Neurological:      Comments: Sleeping. Eyes closed.             Review of Symptoms        Living Arrangements:  Lives with family    Psychosocial/Cultural:   See Palliative Psychosocial Note: Yes  - lives at home with daughter Carolyn   **Primary  to Follow**  Palliative Care  Consult: No        Advance Care Planning  Advance Directives:     Decision Making:  Patient unable to communicate due to disease severity/cognitive impairment and Family answered questions  Goals of Care: The family endorses that what is most important right now is to focus on comfort and QOL     Accordingly, we have decided that the best plan to meet the patient's goals includes enrolling in hospice care         Significant Labs: reviewed  CBC:   Recent Labs   Lab 12/12/23  0657   WBC 10.72   HGB 14.8   HCT 43.0   MCV 83        BMP:  Recent Labs   Lab 12/12/23  0657  "  *      K 3.3*   *   CO2 18*   BUN 42*   CREATININE 1.5*   CALCIUM 8.8   MG 1.8     LFT:  Lab Results   Component Value Date    AST 39 12/08/2023    ALKPHOS 107 12/08/2023    BILITOT 1.5 (H) 12/08/2023     Albumin:   Albumin   Date Value Ref Range Status   12/08/2023 3.2 (L) 3.5 - 5.2 g/dL Final     Protein:   Total Protein   Date Value Ref Range Status   12/08/2023 6.9 6.0 - 8.4 g/dL Final     Lactic acid:   No results found for: "LACTATE"    Significant Imaging: reviewed    35 minutes face to face time in discussion of symptom assessment, goals of care, emotional support, communication of prognosis, coordination of care, exploring burden/ benefit of various approaches of treatment and discharge planning.  This also includes non-face to face time preparing to see the patient (eg, review of tests/imaging), obtaining and/or reviewing separately obtained history, documenting clinical information in the electronic or other health record, independently interpreting results and communicating results to the patient/family/caregiver, or care coordinator.     Jeff Hwang MD  Palliative Medicine  [facility] S[facility] ([place])                "

## 2023-12-12 NOTE — ASSESSMENT & PLAN NOTE
Encephalopathy  Leukocytosis  - Patient was brought in by family for worsening confusion and agitation, and decreased intake for approximately 2 weeks  - Encephalopathy due to urinary tract infection along with marked electrolyte derangements in ARGENIS  - UA consistent with infection  - Continue empiric ceftriaxone 1 g IV Q 24 hours  - Continue IV fluids D5W  - Urine culture was not sent off from the ER, send for culture after abx were administered and may not be helpful and it showed multiple organisms with none in predominance  - Blood cultures no growth to date  - Mentation improved back to baseline, WBC now normal and she remains AF  - Speech following and patient advanced to full liquids  - Patient to complete course of treatment with Rocephin  - Repeat labs later today and in a.m.

## 2023-12-12 NOTE — ASSESSMENT & PLAN NOTE
12/12/2023:  - chart/interval history reviewed in depth  - patient seen at bedside. Her daughter Carolyn and god daughter present at bedside  - patient sleeping, resting comfortably. Did not awake to gentle stimuli. Eyes remained closed  - Carolyn shared that patient is more comfortable today and appears to be steadily returning back to her baseline as she seems less agitated overall. At baseline patient is restless to an extent.   - Carolyn shared that plan is clear for home with hospice. She asked all the questions she had  - she was grateful for being made aware of that possibility for more support at home  - we talked through the importance of allowing patient to lead in terms of her PO intake needs. Carolyn stated her agreement  - discussed the difficulty that given patients dementia, she is unable to convey her needs. Carolyn will encourage/offer to patient but if patient is not interested then will not over do it.  - Carolyn very appreciative of the care and support  [] plan for discharge home with hospice, arranged by case management  [] discharge timing per primary team/case management once patient is appropriately optimized as possible

## 2023-12-12 NOTE — SUBJECTIVE & OBJECTIVE
Medications:  Continuous Infusions:   dextrose 5% lactated ringers 50 mL/hr at 12/12/23 0609     Scheduled Meds:   amLODIPine  5 mg Oral BID    cefTRIAXone (ROCEPHIN) IVPB  1 g Intravenous Q24H    heparin (porcine)  5,000 Units Subcutaneous Q8H    miconazole NITRATE 2 %   Topical (Top) BID    OLANZapine  2.5 mg Oral QHS    potassium phosphate IVPB  30 mmol Intravenous Once    sodium chloride 0.9%  10 mL Intravenous Q8H     PRN Meds:acetaminophen, labetaloL, melatonin, naloxone, senna-docusate 8.6-50 mg, sodium chloride 0.9%    Objective:     Vital Signs (Most Recent):  Temp: 97.8 °F (36.6 °C) (12/12/23 1134)  Pulse: 91 (12/12/23 1134)  Resp: 16 (12/12/23 1134)  BP: (!) 201/118 (12/12/23 1134)  SpO2: 100 % (12/12/23 1134) Vital Signs (24h Range):  Temp:  [97.5 °F (36.4 °C)-97.8 °F (36.6 °C)] 97.8 °F (36.6 °C)  Pulse:  [] 91  Resp:  [16-20] 16  SpO2:  [92 %-100 %] 100 %  BP: (107-202)/() 201/118     Weight: 68.8 kg (151 lb 10.8 oz)  Body mass index is 23.76 kg/m².       Physical Exam  Vitals and nursing note reviewed.   Constitutional:       General: She is not in acute distress.     Comments: Chronically ill appearing. sleeping   Eyes:      Comments: Eyes closed during visit   Pulmonary:      Effort: No respiratory distress.   Neurological:      Comments: Sleeping. Eyes closed.             Review of Symptoms        Living Arrangements:  Lives with family    Psychosocial/Cultural:   See Palliative Psychosocial Note: Yes  - lives at home with daughter Carolyn   **Primary  to Follow**  Palliative Care  Consult: No        Advance Care Planning   Advance Directives:     Decision Making:  Patient unable to communicate due to disease severity/cognitive impairment and Family answered questions  Goals of Care: The family endorses that what is most important right now is to focus on comfort and QOL     Accordingly, we have decided that the best plan to meet the patient's goals includes  "enrolling in hospice care         Significant Labs: reviewed  CBC:   Recent Labs   Lab 12/12/23 0657   WBC 10.72   HGB 14.8   HCT 43.0   MCV 83        BMP:  Recent Labs   Lab 12/12/23 0657   *      K 3.3*   *   CO2 18*   BUN 42*   CREATININE 1.5*   CALCIUM 8.8   MG 1.8     LFT:  Lab Results   Component Value Date    AST 39 12/08/2023    ALKPHOS 107 12/08/2023    BILITOT 1.5 (H) 12/08/2023     Albumin:   Albumin   Date Value Ref Range Status   12/08/2023 3.2 (L) 3.5 - 5.2 g/dL Final     Protein:   Total Protein   Date Value Ref Range Status   12/08/2023 6.9 6.0 - 8.4 g/dL Final     Lactic acid:   No results found for: "LACTATE"    Significant Imaging: reviewed  "

## 2023-12-12 NOTE — PT/OT/SLP PROGRESS
Speech Language Pathology Treatment    Patient Name:  Dalila Garcia   MRN:  [id number]  Admitting Diagnosis: UTI (urinary tract infection)    Recommendations:                 General Recommendations:    SLP to follow 3-5x/week during admit for ongoing assessment of oropharyngeal swallow function      Diet recommendations:    Full liquid diet recommended   small volume of thin pureed solids when awake  thin liquids (thin liquid IDDSI 0)     Aspiration Precautions:   Feed only when awake/alert  Recommend feeding only when requesting PO  Allow pt to self feed as able, providing support and guidance via hand over hand assist as needed  Small bolus volumes  Reduce rate as able (pinch straw to reduce volume and rate with thin liquid intake)     General Precautions: Standard, aspiration     Communication strategies:  narrate cares, single 1 step commands, yes/no questions or choice/contrast as able       Assessment:     Dalila Garcia is a 89 y.o. female with an SLP diagnosis of oropharyngeal dysphagia, characterized by oral holding, prolonged oral manipulation and transit. Multiple swallows per bolus and intermittent cough.     Subjective     Patient seen across 2 attempts, with family at bedside  Initially assisted RN with repositioning and discussion of appropriate medication provision (either in applesauce, crushed followed by thin liquid wash, or crushed in liquid)  Later in day for education re: swallowing strategies    Pain/Comfort:   No signs of pain/discomfort    Respiratory Status: Room air    Objective:     Due to patient lethargic state, session focused on family education. Reviewed with family progress since evaluation, family reporting pt with intermittent intake- though, again reporting emesis post intake. This was not noted on SLP dysphagia evaluation, however, volumes were limited. Discussed recommendation for upright seating for up to 2H post intake, no less than 1H to promoted esophageal clearance and  reduce risk of reflux. Reviewed recommendations including continued full liquid diet as pt with significant oral holding with purees. Demonstrated thinning out thick purees such as pudding with milk to assist with oral manipulation. Reviewed limiting rate and bolus volumes to reduce risk of aspiration. Family with no questions at end of session.      Goals:   Multidisciplinary Problems       SLP Goals          Problem: SLP    Goal Priority Disciplines Outcome   SLP Goal     SLP Ongoing, Progressing   Description: 1. Pt will be able to sustain wakefulness for 3-5 min  2. Pt will be able to sustain attention to a simple, familiar task for 5 min  3. Pt will be able to consume thin liquids from a straw with no signs of airway threat or aspiration given max assistance for use of volume and rate control  4. Patient will posteriorly transport thin puree bolus within 60 seconds given max verbal and tactile cues from therapist.                        Plan:     Patient to be seen:  3 x/week, 5 x/week   Plan of Care expires:  12/23/23  Plan of Care reviewed with:  family, patient, other (see comments) (RN, MD)   SLP Follow-Up:  Yes       Discharge recommendations:   no tx indicated at this time upon d/c as patient is transitioning to home hospice  Barriers to Discharge:   Patient is discharging with home hospice, no barriers given support from home hospice care    Time Tracking:     SLP Treatment Date:   12/12/23  Speech Start Time:  1148  Speech Stop Time:  1200     Speech Total Time (min):  12 min    Billable Minutes: Treatment Swallowing Dysfunction 12 12/13/2023

## 2023-12-12 NOTE — ASSESSMENT & PLAN NOTE
Hypernatremia  Metabolic and lactic acidosis  Hypokalemia  Hypophosphatemia  - Baseline creatinine approximately 0.8, last documented 2 weeks ago.  Reported poor intake that started approximately 2 weeks ago  - Creatinine on admission is 2.6, sodium of 147 with a bicarb of 18  - IV fluids with normal saline initiated on admission  - Sodium peak of 151 with creatinine up at 2.9, change IV fluids to D5W at 100 cc/hr on 12/9  - Na level normalized, and creatinine trending down, but potassium level low  - Decreased IV rate to 50 cc/hr and change to D5LR on 12/10  - Will continue rate of IV fluids and supplement with IV KPhos  - Will further adjust IV fluids as needed based on laboratory testing results  - Renal function almost fully normalized  - Continue strict I&Os and repeat labs in am

## 2023-12-12 NOTE — ASSESSMENT & PLAN NOTE
- noted  - bed bound, very limited communication and diminished PO intake. Seems was in this condition prior to recent infection that only caused worsened confusion and agitation.   - meets for home hospice on above basis (FAST 7c)  - plan for discharge home with hospice

## 2023-12-12 NOTE — ASSESSMENT & PLAN NOTE
Debility  - Family reports a noticeable decline starting August of this year  - Progressive dementia explained and goals of care addressed, patient is a full code  - Palliative Care input appreciated, and education and supportive of family done  - Plan for discharge with home hospice

## 2023-12-12 NOTE — PLAN OF CARE
Patient disoriented x 4. Stable on room air. VS and assessment per flowsheets. IVF in progress. Received iv abx according to MAR. No injuries, falls, or trauma occurred during shift. Bed low and locked, side rails up x3, call light within reach. Family at bedside. Safety maintained throughout shift.      Problem: Adult Inpatient Plan of Care  Goal: Plan of Care Review  Outcome: Ongoing, Progressing  Goal: Patient-Specific Goal (Individualized)  Outcome: Ongoing, Progressing  Goal: Absence of Hospital-Acquired Illness or Injury  Outcome: Ongoing, Progressing  Goal: Optimal Comfort and Wellbeing  Outcome: Ongoing, Progressing  Goal: Readiness for Transition of Care  Outcome: Ongoing, Progressing     Problem: Fall Injury Risk  Goal: Absence of Fall and Fall-Related Injury  Outcome: Ongoing, Progressing

## 2023-12-12 NOTE — PLAN OF CARE
I certify I provided patient choice and a list to the patient/family of CMS rated Home Health, SNF, IRF, LTACH, penitentiary Nursing Homes Patient/Family signed Patient's Choice Disclosure Form and will allow placement with first available provider/agency.     12/12/23 0919   Post-Acute Status   Post-Acute Authorization Hospice   Hospice Status Set-up Complete/Auth obtained   Patient choice form signed by patient/caregiver List with quality metrics by geographic area provided;List from CMS Compare;List from System Post-Acute Care   Discharge Delays (!) Post-Acute Set-up   Discharge Plan   Discharge Plan A Hospice/home

## 2023-12-13 VITALS
WEIGHT: 147.94 LBS | OXYGEN SATURATION: 94 % | RESPIRATION RATE: 16 BRPM | DIASTOLIC BLOOD PRESSURE: 93 MMHG | BODY MASS INDEX: 23.22 KG/M2 | HEIGHT: 67 IN | SYSTOLIC BLOOD PRESSURE: 146 MMHG | TEMPERATURE: 98 F | HEART RATE: 81 BPM

## 2023-12-13 PROBLEM — Z51.5 COMFORT MEASURES ONLY STATUS: Status: ACTIVE | Noted: 2023-12-13

## 2023-12-13 LAB
ANION GAP SERPL CALC-SCNC: 13 MMOL/L (ref 8–16)
BASOPHILS # BLD AUTO: 0.03 K/UL (ref 0–0.2)
BASOPHILS NFR BLD: 0.4 % (ref 0–1.9)
BUN SERPL-MCNC: 37 MG/DL (ref 10–30)
CALCIUM SERPL-MCNC: 8.3 MG/DL (ref 8.7–10.5)
CHLORIDE SERPL-SCNC: 111 MMOL/L (ref 95–110)
CO2 SERPL-SCNC: 19 MMOL/L (ref 23–29)
CREAT SERPL-MCNC: 1.4 MG/DL (ref 0.5–1.4)
DIFFERENTIAL METHOD: ABNORMAL
EOSINOPHIL # BLD AUTO: 0.1 K/UL (ref 0–0.5)
EOSINOPHIL NFR BLD: 0.6 % (ref 0–8)
ERYTHROCYTE [DISTWIDTH] IN BLOOD BY AUTOMATED COUNT: 16.7 % (ref 11.5–14.5)
EST. GFR  (NO RACE VARIABLE): 35 ML/MIN/1.73 M^2
GLUCOSE SERPL-MCNC: 99 MG/DL (ref 70–110)
HCT VFR BLD AUTO: 43 % (ref 37–48.5)
HGB BLD-MCNC: 14.9 G/DL (ref 12–16)
IMM GRANULOCYTES # BLD AUTO: 0.2 K/UL (ref 0–0.04)
IMM GRANULOCYTES NFR BLD AUTO: 2.6 % (ref 0–0.5)
LYMPHOCYTES # BLD AUTO: 0.9 K/UL (ref 1–4.8)
LYMPHOCYTES NFR BLD: 11.9 % (ref 18–48)
MAGNESIUM SERPL-MCNC: 1.6 MG/DL (ref 1.6–2.6)
MCH RBC QN AUTO: 28.8 PG (ref 27–31)
MCHC RBC AUTO-ENTMCNC: 34.7 G/DL (ref 32–36)
MCV RBC AUTO: 83 FL (ref 82–98)
MONOCYTES # BLD AUTO: 0.3 K/UL (ref 0.3–1)
MONOCYTES NFR BLD: 4.4 % (ref 4–15)
NEUTROPHILS # BLD AUTO: 6.2 K/UL (ref 1.8–7.7)
NEUTROPHILS NFR BLD: 80.1 % (ref 38–73)
NRBC BLD-RTO: 1 /100 WBC
PHOSPHATE SERPL-MCNC: 2.9 MG/DL (ref 2.7–4.5)
PLATELET # BLD AUTO: 147 K/UL (ref 150–450)
PMV BLD AUTO: 11.8 FL (ref 9.2–12.9)
POTASSIUM SERPL-SCNC: 3.4 MMOL/L (ref 3.5–5.1)
RBC # BLD AUTO: 5.18 M/UL (ref 4–5.4)
SODIUM SERPL-SCNC: 143 MMOL/L (ref 136–145)
WBC # BLD AUTO: 7.71 K/UL (ref 3.9–12.7)

## 2023-12-13 PROCEDURE — 25000003 PHARM REV CODE 250: Performed by: PHYSICIAN ASSISTANT

## 2023-12-13 PROCEDURE — 36415 COLL VENOUS BLD VENIPUNCTURE: CPT | Performed by: HOSPITALIST

## 2023-12-13 PROCEDURE — 85025 COMPLETE CBC W/AUTO DIFF WBC: CPT | Performed by: HOSPITALIST

## 2023-12-13 PROCEDURE — 63600175 PHARM REV CODE 636 W HCPCS: Performed by: PHYSICIAN ASSISTANT

## 2023-12-13 PROCEDURE — 83735 ASSAY OF MAGNESIUM: CPT | Performed by: HOSPITALIST

## 2023-12-13 PROCEDURE — 80048 BASIC METABOLIC PNL TOTAL CA: CPT | Performed by: HOSPITALIST

## 2023-12-13 PROCEDURE — 84100 ASSAY OF PHOSPHORUS: CPT | Performed by: HOSPITALIST

## 2023-12-13 PROCEDURE — 25000003 PHARM REV CODE 250: Performed by: HOSPITALIST

## 2023-12-13 PROCEDURE — 63600175 PHARM REV CODE 636 W HCPCS: Performed by: HOSPITALIST

## 2023-12-13 PROCEDURE — A4216 STERILE WATER/SALINE, 10 ML: HCPCS | Performed by: PHYSICIAN ASSISTANT

## 2023-12-13 RX ORDER — POTASSIUM CHLORIDE 7.45 MG/ML
10 INJECTION INTRAVENOUS ONCE
Status: COMPLETED | OUTPATIENT
Start: 2023-12-13 | End: 2023-12-13

## 2023-12-13 RX ADMIN — SODIUM CHLORIDE, SODIUM LACTATE, POTASSIUM CHLORIDE, CALCIUM CHLORIDE AND DEXTROSE MONOHYDRATE: 5; 600; 310; 30; 20 INJECTION, SOLUTION INTRAVENOUS at 06:12

## 2023-12-13 RX ADMIN — AMLODIPINE BESYLATE 5 MG: 5 TABLET ORAL at 10:12

## 2023-12-13 RX ADMIN — Medication 10 ML: at 06:12

## 2023-12-13 RX ADMIN — HEPARIN SODIUM 5000 UNITS: 5000 INJECTION INTRAVENOUS; SUBCUTANEOUS at 06:12

## 2023-12-13 RX ADMIN — POTASSIUM CHLORIDE 10 MEQ: 7.46 INJECTION, SOLUTION INTRAVENOUS at 10:12

## 2023-12-13 RX ADMIN — MICONAZOLE NITRATE: 20 POWDER TOPICAL at 09:12

## 2023-12-13 RX ADMIN — CEFTRIAXONE SODIUM 1 G: 1 INJECTION, POWDER, FOR SOLUTION INTRAMUSCULAR; INTRAVENOUS at 12:12

## 2023-12-13 NOTE — PLAN OF CARE
Follow up PCP appointment scheduled and added to AVS. [facility] to assume care on discharge. CM to arrange ambulance transportation home.   12/13/23 1014   Final Note   Assessment Type Final Discharge Note   Anticipated Discharge Disposition [facility]   Hospital Resources/Appts/Education Provided Provided patient/caregiver with written discharge plan information;Appointments scheduled and added to AVS   Post-Acute Status   Post-Acute Authorization Hospice   Hospice Status Set-up Complete/Auth obtained   Discharge Delays None known at this time     [facility]  Discharge Final Note    Primary Care Provider: Vazquez Marsh MD    Expected Discharge Date: 12/13/2023    Final Discharge Note (most recent)       Final Note - 12/13/23 1014          Final Note    Assessment Type Final Discharge Note (P)      Anticipated Discharge Disposition Hospice/Home (P)      Hospital Resources/Appts/Education Provided Provided patient/caregiver with written discharge plan information;Appointments scheduled and added to AVS (P)         Post-Acute Status    Post-Acute Authorization Hospice (P)      Hospice Status Set-up Complete/Auth obtained (P)      Discharge Delays None known at this time (P)                      Important Message from Medicare  Important Message from Medicare regarding Discharge Appeal Rights: Given to patient/caregiver, Explained to patient/caregiver, Signed/date by patient/caregiver     Date IMM was signed: 12/11/23  Time IMM was signed: 0840    Contact Info       Vazquez Marsh MD   Specialty: Internal Medicine   Relationship: PCP - General    Ren[address] MEADE  [facility] [address]   Phone: [phone]       Next Steps: Follow up in 1 week(s)

## 2023-12-13 NOTE — PLAN OF CARE
Problem: Skin Injury Risk Increased  Goal: Skin Health and Integrity  Intervention: Optimize Skin Protection  Flowsheets (Taken 12/12/2023 1949)  Pressure Reduction Techniques:   frequent weight shift encouraged   weight shift assistance provided  Pressure Reduction Devices: positioning supports utilized  Skin Protection: skin sealant/moisture barrier applied  Head of Bed (HOB) Positioning: HOB at 30-45 degrees

## 2023-12-13 NOTE — PLAN OF CARE
[facility]  Department of Hospital Medicine  [address] [place] [address]  [phone] after hours  [phone] fax    HOSPICE  ORDERS    12/13/2023    Admit to Hospice:  Home Service     Diagnoses:   Active Hospital Problems    Diagnosis  POA    *UTI (urinary tract infection) [N39.0]  Yes     Priority: 1 - High    Acute renal failure superimposed on stage 2 chronic kidney disease [N17.9, N18.2]  Yes     Priority: 2     Comfort measures only status [Z51.5]  Not Applicable    Goals of care, counseling/discussion [Z71.89]  Not Applicable    Severe malnutrition [E43]  Yes    Infectious encephalopathy [G93.49, B99.9]  Yes    Dementia [F03.90]  Yes    Atrial fibrillation [I48.91]  Yes    Congestive heart failure [I50.9]  Yes    Essential hypertension [I10]  Yes      Resolved Hospital Problems   No resolved problems to display.       Hospice Qualifying Diagnoses:        Patient has a life expectancy < 6 months due to:  Primary Hospice Diagnosis:  Severe dementia, debility, severe malnutrition   Comorbid Conditions Contributing to Decline:  CHF, Afib, HTN    Vital Signs: Routine per Hospice Protocol.    Code Status: Full    Allergies:   Review of patient's allergies indicates:   Allergen Reactions    Lotensin [benazepril] Edema    Celexa [citalopram] Other (See Comments)     Hot flashes        Diet: Full liquids    Activities: As tolerated    Goals of Care Treatment Preferences:  Code Status: Full Code          What is most important right now is to focus on comfort and QOL .  Accordingly, we have decided that the best plan to meet the patient's goals includes enrolling in hospice care.      Nursing: Per Hospice Routine.    Routine Skin for Bedridden Patients: Apply moisture barrier cream to all skin folds and wet areas in perineal area daily and after baths and all bowel movements.      Medications:        Medication List        CONTINUE taking these medications       amLODIPine 5 MG tablet  Commonly known as: NORVASC  TAKE 1 TABLET(5 MG) BY MOUTH TWICE DAILY     OLANZapine 2.5 MG tablet  Commonly known as: ZyPREXA  Take 1 tablet (2.5 mg total) by mouth every evening.     triamcinolone acetonide 0.5% 0.5 % Crea  Commonly known as: KENALOG  Apply topically 2 (two) times daily.            ASK your doctor about these medications      atorvastatin 10 MG tablet  Commonly known as: LIPITOR  TAKE 1 TABLET(10 MG) BY MOUTH EVERY DAY                Future Orders:  Hospice Medical Director may dictate new orders for comfortable care measures & sign death certificate.        _________________________________  Lucille Blackmon MD  12/13/2023

## 2023-12-13 NOTE — PLAN OF CARE
Patient is still getting agitated intermittently, she pulled off her IV and another was restarted,  Problem: Adult Inpatient Plan of Care  Goal: Plan of Care Review  Outcome: Ongoing, Progressing  Goal: Patient-Specific Goal (Individualized)  Outcome: Ongoing, Progressing  Goal: Absence of Hospital-Acquired Illness or Injury  Outcome: Ongoing, Progressing  Goal: Optimal Comfort and Wellbeing  Outcome: Ongoing, Progressing  Goal: Readiness for Transition of Care  Outcome: Ongoing, Progressing     Problem: Fluid and Electrolyte Imbalance (Acute Kidney Injury/Impairment)  Goal: Fluid and Electrolyte Balance  Outcome: Ongoing, Progressing     Problem: Oral Intake Inadequate (Acute Kidney Injury/Impairment)  Goal: Optimal Nutrition Intake  Outcome: Ongoing, Progressing

## 2023-12-13 NOTE — DISCHARGE SUMMARY
"[facility] Medicine  Discharge Summary      Patient Name: Dalila Garcia  MRN: [id number]  [facility]: [id number]  Patient Class: IP- Inpatient  Admission Date: 12/8/2023  Hospital Length of Stay: 5 days  Discharge Date and Time: 12/13/2023  3:15 PM  Attending Physician: Lucille Blackmon MD  Discharging Provider: Lucille Blackmon MD  Primary Care Provider: Vazquez Marsh MD    Primary Care Team: Networked reference to record PCT     HPI:   Ms. Dalila Garcia is a 89 y.o. female, with PMH of Dementia, A. Fib, CHF, HTN, who presented to [facility] ED on 12/8/23 due to increased confusion and mumbling for the past 9 hours. Her family reports that normally her speech is clear. She additionally has been restless, and "tossing and turning in bed with her legs up on the wall like she was trying to walk on the wall" per her family's report. They notes that she has been "mouth breathing" for the past week, and when EMS arrived, her O2 sats were reported to be in the 80% range on RA. Other associated symptoms include urinary retention, poor appetite and no intake x 2 days, vomiting after eating. EMS also reported that her SBP was 80, and she was in A. Fib with heart rates no higher than 102 upon arrival to the ED. In the ED, she was evaluated with labs, showing ARGENIS with BUN 47 and Cr of 2.6. A CBC showed leukocytosis of 28K, with left shift. A UA showed a nitritie positive UTI with 3+ leukocytes, >100 WBC/hpf, and many bacteria. She was treated in the ED with Rocephin. She was admitted to inpatient status.     * No surgery found *      Hospital Course:   Ms. Garcia presented with increased confusion and decreased p.o. intake.  Admitted with encephalopathy with UTI and dehydration.  Empirically started on Rocephin and treatment initiated with IV fluids. Blood culture NGTD and urine culture with multiple organisms and no organism in predominance. ARGENIS resolved with IV fluids and all electrolytes were corrected. " Palliative care consulted given end stage dementia and family elected for home hospice but patient to remain a full code. Explained to family in detail that given her end stage dementia with pocketing of food/poor intake and bedbound status that continued progressive decline in status is expected. Patient completed full course of antibiotic treatment with Rocephin in the hospital and she was discharged on home hospice.     UTI (urinary tract infection)  Encephalopathy  Leukocytosis  - Patient was brought in by family for worsening confusion and agitation, and decreased intake for approximately 2 weeks  - Encephalopathy due to urinary tract infection along with marked electrolyte derangements in ARGENIS  - UA was consistent with infection  - Continued empiric ceftriaxone 1 g IV Q 24 hours, and she completed full course of treatment with IV abx.  - Continued IV fluids D5W until volume status and Na level corrected  - Urine culture was not sent off from the ER, sent for culture after abx were administered and it showed multiple organisms with none in predominance  - Blood cultures no growth to date  - Mentation improved back to baseline, WBC normalized and she remained AF  - Speech therapy followed and patient was advanced to full liquids  - Resolved     Acute renal failure superimposed on stage 2 chronic kidney disease  Hypernatremia  Metabolic and lactic acidosis  Hypokalemia  Hypophosphatemia  - Baseline creatinine approximately 0.8 - 1.2.  Reported poor intake that started approximately 2 weeks ago  - Creatinine on admission is 2.6, sodium of 147 with a bicarb of 18  - IV fluids with normal saline initiated on admission  - Sodium peak of 151 with creatinine up at 2.9, changed IV fluids to D5W at 100 cc/hr on 12/9  - Na level normalized, and creatinine trended to normal and all electrolytes were corrected  - Decreased IV rate to 50 cc/hr and change to D5LR on 12/10  - Renal function almost normalized  - Explained to  family that return of ARGENIS is likely given her poor intake  - Resolved     Dementia  Debility  - Family reported a noticeable decline starting August of this year  - Progressive dementia explained and goals of care addressed  - Palliative Care input appreciated, and education and supportive of family done  - Patient is a full code but family was agreeable to home hospice  - At baseline, patient can answer in 1-2 words sometimes but not consistent; has poor intake and pockets her food, and is bedbound for some time. She was at her baseline on discharge  - Discharged with home hospice     Severe malnutrition  - Nutrition consulted. Most recent weight and BMI monitored  - Advanced to full liquids  - Expected to not improve due to dementia    Infectious encephalopathy  - As discussed above     Atrial fibrillation  - HUINH4SCFd Score: 3. Not currently on anticoagulation.   - Rate is controlled     Essential hypertension  - Chronic, and controlled  - Continued amlodipine     Congestive heart failure  - Patient was compensated, and clinically dry  - BNP of 216 as expected and last echo showed EF of 60-65% in May 2018  - Chest X ray unremarkable and without signs of volume overload  - Continued to monitor volume status while patient was getting judicious IV fluids     Goals of Care Treatment Preferences:  Code Status: Full Code          What is most important right now is to focus on comfort and QOL .  Accordingly, we have decided that the best plan to meet the patient's goals includes enrolling in hospice care.      Consults:   Consults (From admission, onward)          Status Ordering Provider     Inpatient consult to Palliative Care  Once        Provider:  (Not yet assigned)    Completed YANCY BRYSON     Inpatient consult to Registered Dietitian/Nutritionist  Once        Provider:  (Not yet assigned)    Completed YANCY BRYSON          Service: Hospital Medicine    Final Active Diagnoses:    Diagnosis Date Noted POA     PRINCIPAL PROBLEM:  UTI (urinary tract infection) [N39.0] 12/08/2023 Yes    Acute renal failure superimposed on stage 2 chronic kidney disease [N17.9, N18.2] 12/08/2023 Yes    Comfort measures only status [Z51.5] 12/13/2023 Not Applicable    Goals of care, counseling/discussion [Z71.89] 12/11/2023 Not Applicable    Severe malnutrition [E43] 12/09/2023 Yes    Infectious encephalopathy [G93.49, B99.9] 12/08/2023 Yes    Dementia [F03.90] 12/08/2023 Yes    Atrial fibrillation [I48.91] 08/21/2017 Yes    Congestive heart failure [I50.9] 12/22/2012 Yes    Essential hypertension [I10]  Yes      Problems Resolved During this Admission:       Discharged Condition: fair    Disposition: Hospice/Home    Follow Up:   Follow-up Information       Vazquez Mrash MD Follow up in 1 week(s).    Specialty: Internal Medicine  Contact information:  [address]  [place] [address] [phone]                           Patient Instructions:      Diet full liquid     Activity as tolerated       Significant Diagnostic Studies: N/A    Pending Diagnostic Studies:       None           Medications:  Reconciled Home Medications:      Medication List        CONTINUE taking these medications      amLODIPine 5 MG tablet  Commonly known as: NORVASC  TAKE 1 TABLET(5 MG) BY MOUTH TWICE DAILY     OLANZapine 2.5 MG tablet  Commonly known as: ZyPREXA  Take 1 tablet (2.5 mg total) by mouth every evening.     triamcinolone acetonide 0.5% 0.5 % Crea  Commonly known as: KENALOG  Apply topically 2 (two) times daily.            ASK your doctor about these medications      atorvastatin 10 MG tablet  Commonly known as: LIPITOR  TAKE 1 TABLET(10 MG) BY MOUTH EVERY DAY              Indwelling Lines/Drains at time of discharge:   Lines/Drains/Airways       None                   Time spent on the discharge of patient: 40 minutes         Lucille Blackmon MD  Department of Hospital Medicine  [facility])

## 2023-12-13 NOTE — NURSING
Pt. Being discharged home per MD orders with hospice. VSS, pt afebrile and no c/o pain at this time. Reviewed discharge instruction, follow-up instructions, and med with daughter. Removed PIV access. Dry gauze/ tape placed to site, CDI. Ride to be by [facility] upon discharge and will transport per stretcher.

## 2023-12-14 LAB
BACTERIA BLD CULT: NORMAL
BACTERIA BLD CULT: NORMAL

## 2023-12-14 RX ORDER — AMLODIPINE BESYLATE 5 MG/1
5 TABLET ORAL 2 TIMES DAILY
Qty: 180 TABLET | Refills: 3 | Status: SHIPPED | OUTPATIENT
Start: 2023-12-14 | End: 2024-12-08

## 2023-12-14 NOTE — TELEPHONE ENCOUNTER
Refill Routing Note   Medication(s) are not appropriate for processing by [facility] for the following reason(s):        Required vitals abnormal  ED/Hospital Visit since last OV with provider    [facility] action(s):  Defer   Requires labs : Yes             Appointments  past 12m or future 3m with PCP    Date Provider   Last Visit   11/29/2023 Vazquez Marsh MD   Next Visit   12/26/2023 Vazquez Marsh MD   ED visits in past 90 days: 1        Note composed:9:40 AM 12/14/2023

## 2023-12-14 NOTE — PHYSICIAN QUERY
"PT Name: Dalila Garcia  MR #: [id number]    DOCUMENTATION CLARIFICATION     CDS/: Sophia Tinajero RN             Contact information: [email or web address]  This form is a permanent document in the medical record.     Query Date: December 14, 2023    By submitting this query, we are merely seeking further clarification of documentation. Please utilize your independent clinical judgment when addressing the question(s) below.    The Medical Record contains the following:   Indicators   Supporting Clinical Findings Location in Medical Record   x AMS, Confusion,  LOC, etc.  Altered mental status  Family acitvated EMS for pt more confused than baseline   EMS with her daughter who states that the patient has been agitated and more confused for the last nine hours.  At baseline, she is able to speak though confused.  However today she is just mumbling nonsensical words and has been very agitated.  She was also "tossing and turning in bed" and had her "legs up on the wall like she was trying to walk on the wall."     Responds to verbal and tactile stimulus, but keeps eyes closed and is nonverbal      Daughter and granddaughter at bedside report she opened her eyes and was mumbling.  Overall, more calm and less agitate     She is disoriented    Awake, alert, able to answer few simple questions with 1-2 words    12/8 ED MD PN              12/8 HP    12/10 Hosp Med MD PN   x Acute/Chronic Illness Encephalopathy due to urinary tract infection along with marked electrolyte derangements in ARGENIS    UTI   Encephalopathy  Leukocytosis  Acute renal failure superimposed on stage 2 chronic kidney disease  Hypernatremia  Metabolic and lactic acidosis  Hypokalemia  Hypophosphatemia  Severe malnutrition  Infectious encephalopathy   12/10 Hosp Med MD PN      12/8 Dc summary   x Radiology Findings No acute abnormality.  12/10 CT head     x Electrolyte Imbalance NA= 147, 151, 150  K 2.9, 3.3  Bun/cr= 51/2.9  Bun/cr= 54/ 2.9 "     Urine culture: Multiple organisms     12/8-10 Lab    12/10 Urine culture   x Medication Ceftriaxone IVPB 12/8-12  NS 125cc/hr 12/8-9  D 5% 125cc/hr  12/9-10  Lorazepam 0.5mg IVP x 2 doses  12/8 12/8-12 MAR             x Treatment         CT Head  BMP/ CBC  Urine culture  Urinalysis  Cardiac monitor   Pulse oximetry  Oxygen   10/8 Orders    Other       The noted clinical guidelines are only system guidelines and do not replace the providers clinical judgment.    The National [place] of Neurologic Disorders and Stroke (NINDS) of the NIH describes encephalopathy as any diffuse disease of the brain that alters brain function or structure.    Provider If possible, please further specify type of Encephalopathy:    [  x ] Metabolic Encephalopathy -     Due to electrolyte imbalance, metabolic derangements, uremic encpehalopathy  or infectious processes, includes Septic Encephalopathy     [   ] Other Encephalopathy (please specify): ____________________     [   ] Other (please specify condition): __________     [   ]  Clinically Undetermined         Please document in your progress notes daily for the duration of treatment until resolved, and include in your discharge summary.    References:  KOLE Amaya RN, CCDS. (2018, June 9). Notes from the Instructor: Encephalopathy tips. Retrieved October 22, 2020, from [email or web address]/articles/[email or web address]-instructor-encephalopathy-tips    ICD-9-CM Coding Clinic First Quarter 2013, Effective with discharges: October 21, 2013 [facility] § Seizure with encephalopathy due to postictal state (2013).    ICD-10-CM/[facility] [employer] Integrated Codebook (Version V.20.8.10.0) [Computer software]. (2020). Retrieved October 21, 2020.    National [place] of Neurological Disorders and Stroke. (2019, March 27). Retrieved October 22, 2020, from https://www.ninds.nih.gov/Disorders/All-Disorders/Aiabutfowjheuy-Bonpkbldxnp-Eazf    Form No. 08631

## 2023-12-14 NOTE — TELEPHONE ENCOUNTER
Care Due:                  Date            Visit Type   Department     Provider  --------------------------------------------------------------------------------                                EP -                              PRIMARY      [facility] INTERNAL  Last Visit: 11-      CARE ([facility])   MEDICINE       [facility] INTER[facility]  Next Visit: 12-      FOLLOW UP    MEDICINE       Psychiatric  Test          Frequency    Reason                     Performed    Due Date  --------------------------------------------------------------------------------    Lipid Panel.  12 months..  atorvastatin.............  01- 01-    [facility] Embedded Care Due Messages. Reference number: [id number].   12/14/2023 9:19:14 AM CST

## 2023-12-14 NOTE — TELEPHONE ENCOUNTER
----- Message from Siomara Steward sent at 12/14/2023  8:45 AM CST -----  Contact: Pt @[phone]  Requesting an RX refill or new RX.  Is this a refill or new RX: refill     RX name and strength (copy/paste from chart):    amLODIPine (NORVASC) 5 MG tablet    Is this a 30 day or 90 day RX:  90 day     Pharmacy name and phone # (copy/paste from chart):    [id number] - [place], [place] - [address]  [address] [address]  Phone: [phone] Fax: [phone]     The doctors have asked that we provide their patients with the following 2 reminders -- prescription refills can take up to 72 hours, and a friendly reminder that in the future you can use your [facility] account to request refills: Yes

## 2023-12-15 ENCOUNTER — TELEPHONE (OUTPATIENT)
Dept: INTERNAL MEDICINE | Facility: CLINIC | Age: 88
End: 2023-12-15
Payer: MEDICARE

## 2023-12-15 DIAGNOSIS — R30.0 DYSURIA: Primary | ICD-10-CM

## 2023-12-15 NOTE — TELEPHONE ENCOUNTER
Pt's daughter states that pt was recently in the hospital for 6 days(daughter asked if you can review pt's chart), pt can not walk and still having UTI problems. Pt was advised of appt but declined and also informed that PCP is out of the office on Holiday. Pt's daughter asking if an antibiotic can be called in.    Please review and respond,  Thank You.

## 2023-12-15 NOTE — TELEPHONE ENCOUNTER
Daughter will take mother back to the ED. Her transportation is an ambulance. She is having mental status changes.

## 2023-12-15 NOTE — TELEPHONE ENCOUNTER
----- Message from Tasha Infante sent at 12/15/2023 12:15 PM CST -----  Contact: [phone] Carolyn  Patient would like to get medical advice.  Symptoms (please be specific):   UTI and misbehaving   How long have you had these symptoms: pt was discharged on 12/13/23 from [facility] for a UTI  Would you like a call back, or a response through your [facility] portal?:   call back  Pharmacy name and phone # (copy from chart):     [id number] - [place], [place] - [address] AT [address]  [address] [address]  Phone: [phone] Fax: [phone]     Comments:

## 2023-12-15 NOTE — TELEPHONE ENCOUNTER
----- Message from Oneyda Colindres MA sent at 12/15/2023  1:00 PM CST -----  Contact: Carito @ [facility] [phone]  Additional msg in regards.  ----- Message -----  From: Saima Santos  Sent: 12/15/2023  12:54 PM CST  To: Maximo YEPEZ Staff    Would like to receive medical advice.     Would they like a call back or a response via [facility]:  call back    Additional information:  Carito from [facility] is calling to speak to the provider or staff on behalf of the pt. Carito states the pt has a UTI, Carito states she would like to know if there is anything she can do to like getting a sample to help the pt more and that way she does not need to leave the comfort of her home. Please call Carito back for advice.

## 2023-12-15 NOTE — TELEPHONE ENCOUNTER
Can you please call this lady daughter about Dr. Alaniz response. I informed that she was gonna need an appt because Dr. Marsh is out. Daughter fussed about pt not being able to walk and want the hospital notes checked and the pt needing antibiotics.  PLEASE

## 2024-03-11 PROBLEM — N18.2 ACUTE RENAL FAILURE SUPERIMPOSED ON STAGE 2 CHRONIC KIDNEY DISEASE: Status: RESOLVED | Noted: 2023-12-08 | Resolved: 2024-03-11

## 2024-03-11 PROBLEM — N17.9 ACUTE RENAL FAILURE SUPERIMPOSED ON STAGE 2 CHRONIC KIDNEY DISEASE: Status: RESOLVED | Noted: 2023-12-08 | Resolved: 2024-03-11

## 2024-03-11 PROBLEM — N39.0 UTI (URINARY TRACT INFECTION): Status: RESOLVED | Noted: 2023-12-08 | Resolved: 2024-03-11

## 2024-06-10 ENCOUNTER — PATIENT MESSAGE (OUTPATIENT)
Dept: INTERNAL MEDICINE | Facility: CLINIC | Age: 89
End: 2024-06-10
Payer: MEDICARE